# Patient Record
Sex: FEMALE | Race: BLACK OR AFRICAN AMERICAN | NOT HISPANIC OR LATINO | Employment: UNEMPLOYED | URBAN - METROPOLITAN AREA
[De-identification: names, ages, dates, MRNs, and addresses within clinical notes are randomized per-mention and may not be internally consistent; named-entity substitution may affect disease eponyms.]

---

## 2017-01-02 ENCOUNTER — HISTORICAL (OUTPATIENT)
Dept: ADMINISTRATIVE | Facility: HOSPITAL | Age: 42
End: 2017-01-02

## 2017-01-14 ENCOUNTER — HISTORICAL (OUTPATIENT)
Dept: ADMINISTRATIVE | Facility: HOSPITAL | Age: 42
End: 2017-01-14

## 2017-05-26 ENCOUNTER — HISTORICAL (OUTPATIENT)
Dept: ADMINISTRATIVE | Facility: HOSPITAL | Age: 42
End: 2017-05-26

## 2018-12-13 ENCOUNTER — HISTORICAL (OUTPATIENT)
Dept: ADMINISTRATIVE | Facility: HOSPITAL | Age: 43
End: 2018-12-13

## 2018-12-13 LAB
ABS NEUT (OLG): 5.42 X10(3)/MCL (ref 2.1–9.2)
ALBUMIN SERPL-MCNC: 3.1 GM/DL (ref 3.4–5)
ALBUMIN/GLOB SERPL: 0.7 {RATIO}
ALP SERPL-CCNC: 59 UNIT/L (ref 38–126)
ALT SERPL-CCNC: 23 UNIT/L (ref 12–78)
AST SERPL-CCNC: 14 UNIT/L (ref 15–37)
BASOPHILS # BLD AUTO: 0 X10(3)/MCL (ref 0–0.2)
BASOPHILS NFR BLD AUTO: 0 %
BILIRUB SERPL-MCNC: 0.2 MG/DL (ref 0.2–1)
BILIRUBIN DIRECT+TOT PNL SERPL-MCNC: 0.1 MG/DL (ref 0–0.2)
BILIRUBIN DIRECT+TOT PNL SERPL-MCNC: 0.1 MG/DL (ref 0–0.8)
BUN SERPL-MCNC: 17 MG/DL (ref 7–18)
CALCIUM SERPL-MCNC: 8.7 MG/DL (ref 8.5–10.1)
CHLORIDE SERPL-SCNC: 106 MMOL/L (ref 98–107)
CO2 SERPL-SCNC: 26 MMOL/L (ref 21–32)
CREAT SERPL-MCNC: 0.76 MG/DL (ref 0.55–1.02)
EOSINOPHIL # BLD AUTO: 0.2 X10(3)/MCL (ref 0–0.9)
EOSINOPHIL NFR BLD AUTO: 2 %
ERYTHROCYTE [DISTWIDTH] IN BLOOD BY AUTOMATED COUNT: 13.6 % (ref 11.5–17)
GLOBULIN SER-MCNC: 4.2 GM/DL (ref 2.4–3.5)
GLUCOSE SERPL-MCNC: 103 MG/DL (ref 74–106)
HCT VFR BLD AUTO: 34 % (ref 37–47)
HGB BLD-MCNC: 10.6 GM/DL (ref 12–16)
LYMPHOCYTES # BLD AUTO: 4.3 X10(3)/MCL (ref 0.6–4.6)
LYMPHOCYTES NFR BLD AUTO: 40 %
MCH RBC QN AUTO: 27.6 PG (ref 27–31)
MCHC RBC AUTO-ENTMCNC: 31.2 GM/DL (ref 33–36)
MCV RBC AUTO: 88.5 FL (ref 80–94)
MONOCYTES # BLD AUTO: 0.6 X10(3)/MCL (ref 0.1–1.3)
MONOCYTES NFR BLD AUTO: 6 %
NEUTROPHILS # BLD AUTO: 5.42 X10(3)/MCL (ref 2.1–9.2)
NEUTROPHILS NFR BLD AUTO: 51 %
PLATELET # BLD AUTO: 299 X10(3)/MCL (ref 130–400)
PMV BLD AUTO: 9.7 FL (ref 9.4–12.4)
POTASSIUM SERPL-SCNC: 4.1 MMOL/L (ref 3.5–5.1)
PROT SERPL-MCNC: 7.3 GM/DL (ref 6.4–8.2)
RBC # BLD AUTO: 3.84 X10(6)/MCL (ref 4.2–5.4)
SODIUM SERPL-SCNC: 140 MMOL/L (ref 136–145)
WBC # SPEC AUTO: 10.6 X10(3)/MCL (ref 4.5–11.5)

## 2018-12-14 LAB
APPEARANCE, UA: CLEAR
BACTERIA SPEC CULT: NORMAL /HPF
BILIRUB UR QL STRIP: NEGATIVE
COLOR UR: YELLOW
GLUCOSE (UA): NEGATIVE
HGB UR QL STRIP: NEGATIVE
KETONES UR QL STRIP: NEGATIVE
LEUKOCYTE ESTERASE UR QL STRIP: NEGATIVE
NITRITE UR QL STRIP: NEGATIVE
PH UR STRIP: 5.5 [PH] (ref 5–9)
PROT UR QL STRIP: NEGATIVE
RBC #/AREA URNS HPF: NORMAL /HPF
SP GR UR STRIP: 1.01 (ref 1–1.03)
SQUAMOUS EPITHELIAL, UA: NORMAL
UROBILINOGEN UR STRIP-ACNC: 1
WBC #/AREA URNS HPF: NORMAL /[HPF]

## 2019-09-17 ENCOUNTER — HISTORICAL (OUTPATIENT)
Dept: PREADMISSION TESTING | Facility: HOSPITAL | Age: 44
End: 2019-09-17

## 2020-06-11 ENCOUNTER — HISTORICAL (OUTPATIENT)
Dept: ADMINISTRATIVE | Facility: HOSPITAL | Age: 45
End: 2020-06-11

## 2021-07-08 ENCOUNTER — HISTORICAL (OUTPATIENT)
Dept: ADMINISTRATIVE | Facility: HOSPITAL | Age: 46
End: 2021-07-08

## 2021-07-08 LAB
ABS NEUT (OLG): 4.15 X10(3)/MCL (ref 2.1–9.2)
BASOPHILS # BLD AUTO: 0.1 X10(3)/MCL (ref 0–0.2)
BASOPHILS NFR BLD AUTO: 0 %
CALCIUM SERPL-MCNC: 9.2 MG/DL (ref 8.4–10.2)
CHLORIDE SERPL-SCNC: 107 MMOL/L (ref 98–107)
CHOLEST SERPL-MCNC: 210 MG/DL
CHOLEST/HDLC SERPL: 3 {RATIO} (ref 0–5)
CO2 SERPL-SCNC: 28 MMOL/L (ref 22–29)
CREAT SERPL-MCNC: 0.69 MG/DL (ref 0.55–1.02)
CREAT UR-MCNC: 144.6 MG/DL (ref 45–106)
CRP SERPL-MCNC: 0.26 MG/DL
DEPRECATED CALCIDIOL+CALCIFEROL SERPL-MC: 15 NG/ML (ref 30–80)
EOSINOPHIL # BLD AUTO: 0.1 X10(3)/MCL (ref 0–0.9)
EOSINOPHIL NFR BLD AUTO: 1 %
EOSINOPHIL NFR BLD MANUAL: 1 % (ref 0–8)
ERYTHROCYTE [DISTWIDTH] IN BLOOD BY AUTOMATED COUNT: 15.2 % (ref 11.5–17)
ERYTHROCYTE [SEDIMENTATION RATE] IN BLOOD: 33 MM/HR (ref 0–20)
EST. AVERAGE GLUCOSE BLD GHB EST-MCNC: 119.8 MG/DL
FERRITIN SERPL-MCNC: 13.25 NG/ML (ref 4.63–204)
FOLATE SERPL-MCNC: 7.1 NG/ML (ref 7–31.4)
GLOBULIN SER-MCNC: 3.9 GM/DL (ref 2.4–3.5)
GLUCOSE SERPL-MCNC: 97 MG/DL (ref 74–100)
HBA1C MFR BLD: 5.8 %
HCT VFR BLD AUTO: 36.4 % (ref 37–47)
HDLC SERPL-MCNC: 67 MG/DL (ref 35–60)
HGB BLD-MCNC: 11.5 GM/DL (ref 12–16)
IRON SATN MFR SERPL: 20 % (ref 20–50)
IRON SERPL-MCNC: 77 UG/DL (ref 50–170)
LDLC SERPL CALC-MCNC: 115 MG/DL (ref 50–140)
LYMPHOCYTES # BLD AUTO: 10.2 X10(3)/MCL (ref 0.6–4.6)
LYMPHOCYTES NFR BLD AUTO: 67 %
LYMPHOCYTES NFR BLD MANUAL: 47 % (ref 13–40)
MAGNESIUM SERPL-MCNC: 2.1 MG/DL (ref 1.6–2.6)
MCH RBC QN AUTO: 26.7 PG (ref 27–31)
MCHC RBC AUTO-ENTMCNC: 31.6 GM/DL (ref 33–36)
MCV RBC AUTO: 84.5 FL (ref 80–94)
MICROALBUMIN UR-MCNC: 11.1 UG/ML
MICROALBUMIN/CREAT RATIO PNL UR: 7.7 MG/GM CR (ref 0–30)
MONOCYTES # BLD AUTO: 0.7 X10(3)/MCL (ref 0.1–1.3)
MONOCYTES NFR BLD AUTO: 4 %
MONOCYTES NFR BLD MANUAL: 7 % (ref 2–11)
NEUTROPHILS # BLD AUTO: 4.15 X10(3)/MCL (ref 2.1–9.2)
NEUTROPHILS NFR BLD AUTO: 27 %
NEUTROPHILS NFR BLD MANUAL: 45 % (ref 47–80)
PHOSPHATE SERPL-MCNC: 3.5 MG/DL (ref 2.3–4.7)
PLATELET # BLD AUTO: 271 X10(3)/MCL (ref 130–400)
PLATELET # BLD EST: NORMAL 10*3/UL
PMV BLD AUTO: 10.6 FL (ref 9.4–12.4)
POTASSIUM SERPL-SCNC: 4 MMOL/L (ref 3.5–5.1)
PREALB SERPL-MCNC: 28.8 MG/DL (ref 16–38)
PROT SERPL-MCNC: 7.3 GM/DL (ref 6.4–8.3)
RBC # BLD AUTO: 4.31 X10(6)/MCL (ref 4.2–5.4)
RBC MORPH BLD: NORMAL
SODIUM SERPL-SCNC: 143 MMOL/L (ref 136–145)
T3FREE SERPL-MCNC: 2.69 PG/ML (ref 1.58–3.91)
T4 FREE SERPL-MCNC: 0.7 NG/DL (ref 0.7–1.48)
TIBC SERPL-MCNC: 303 UG/DL (ref 70–310)
TIBC SERPL-MCNC: 380 UG/DL (ref 250–450)
TRANSFERRIN SERPL-MCNC: 344 MG/DL (ref 180–382)
TRIGL SERPL-MCNC: 140 MG/DL (ref 37–140)
TSH SERPL-ACNC: 3.35 UIU/ML (ref 0.35–4.94)
VIT B12 SERPL-MCNC: 203 PG/ML (ref 213–816)
VLDLC SERPL CALC-MCNC: 28 MG/DL
WBC # SPEC AUTO: 15.2 X10(3)/MCL (ref 4.5–11.5)

## 2022-04-30 NOTE — ED PROVIDER NOTES
"   Patient:   Cristina Lazar            MRN: [id number]            FIN: [id number]               Age:   43 years     Sex:  Female     :  1975   Associated Diagnoses:   Urticaria; Abdominal pain, left lower quadrant   Author:   Sera GARZA, Antoni Rodriguez      Basic Information   Time seen: Date & time 2018 01:10:00.   History source: Patient.   Arrival mode: Private vehicle, wheelchair.   History limitation: None.   Additional information: Patient's physician(s): Jag GARZA, Nick BLACKMON.      History of Present Illness   The patient presents with   44 y/o AAF with a history of DM, HTN and who is s/p abdominoplasty on 18, presents to the ED with c/o left sided abdominal pain, nausea, and  generalized hives. Patient states that the abdominal pain began at 1000 18 and woke up tonight with diffuse hives with itching prompting her visit to the ED. Patient denies any new medicines and denies dysphagia or difficulty speaking. Per triage note, patient reported fever of 102 degrees F yesterday (18), but was afebrile today, and last took Percocet at 1300 (18).  .  The onset was 2018 10:00:00 .  The course/duration of symptoms is constant.  The character of symptoms is "Pain".  The degree at onset was moderate.  The Location of pain at onset was left, upper, lower and abdominal.  The degree at present is moderate.  The Location of pain at present is left, upper, lower and abdominal.  Radiating pain: none. The exacerbating factor is changing position.  The relieving factor is analgesics.  Therapy today: prescription medications including Percocet.  Risk factors consist of diabetes mellitus and hypertension.  Associated symptoms: nausea.        Review of Systems   Constitutional symptoms:  Negative except as documented in HPI.   Skin symptoms:  generalized diffuse hives with itching.   Eye symptoms:  Negative except as documented in HPI   ENMT symptoms:  Negative except as documented " in HPI.   Respiratory symptoms:  Negative except as documented in HPI.   Cardiovascular symptoms:  Negative except as documented in HPI.   Gastrointestinal symptoms:  Abdominal pain, moderate, left upper quadrant, left lower quadrant, nausea.    Genitourinary symptoms:  Negative except as documented in HPI.   Musculoskeletal symptoms:  Negative except as documented in HPI.   Neurologic symptoms:  No dysphagia, no difficulty speaking.   Psychiatric symptoms:  Negative except as documented in HPI.   Endocrine symptoms:  Negative except as documented in HPI.   Hematologic/Lymphatic symptoms:  Negative except as documented in HPI   Allergy/immunologic symptoms:  Negative except as documented in HPI             Additional review of systems information: All other systems reviewed and otherwise negative.      Health Status   Allergies:    Allergic Reactions (Selected)  No Known Medication Allergies  Nonallergic Reactions (Selected)  Severity Not Documented  Tape- No reactions were documented..   Medications:  (Selected)   Inpatient Medications  Ordered  Zofran INJ.  (IV Push / IM)  2 mg/mL: 4 mg, form: Injection, IV Push, Once, first dose 13 12:00:00 CST, stop date 13 12:00:00 CST  Prescriptions  Prescribed  Flonase 50 mcg/inh nasal spray: 1 spray(s), Nasal, BID, # 1 EA, 0 Refill(s)  Zofran ODT 4 mg oral tablet, disintegratin, Oral, q4hr, PRN PRN nausea/vomiting, # 18 tab(s), 0 Refill(s), Pharmacy: [id number]  albuterol-ipratropium MDI inhalation aerosol with adapter: 2 puff(s), INH, QID, # 1 units, 5 Refill(s)  Documented Medications  Documented  CYANOCOBALAMIN 1000 MCG/ML SOLN:   LISINOPRIL 40 MG TABS: 40 mg = 1 tab(s), Oral, Daily  PHENTERMINE 37.5 MG TABLET: 37.5 mg = 1 tab(s), Oral, Daily  TOPIRAMATE 100 MG TABLET: 100 mg = 1 tab(s), Oral, Daily  TOPIRAMATE 50 MG TABS: 50 mg = 1 tab(s), Oral, Daily, PRN PRN migraine headache  VICTOZA 18 MG/3ML SOPN: .      Past Medical/ Family/ Social  History   Medical history:    Resolved  Able to lie down ([id number]):  Resolved.  Comments:  8/27/2014 CDT 11:07 CDT - Kandi NEWMAN , Laisha casanova w/o SOB  Diabetes mellitus ([id number]):  Resolved.  Dysphagia ([id number]):  Resolved.  ET - Exercise tolerance ([id number]):  Resolved.  Hypertension ([id number]):  Resolved.  Obesity ([id number]):  Resolved.  sleep apnea resolved 3 years ago per patient ([id number]):  Resolved..   Surgical history:    23614 - EXTENDED ABDOMINOPLASTY (None) on 12/6/2018 at 43 Years.  Comments:  12/6/2018 09:16 - Jeanne Davenport RN  auto-populated from documented surgical case  Esophagogastroduodenoscopy on 9/10/2014 at 38 Years.  Comments:  9/10/2014 11:39 - Mansi Moore RN  auto-populated from documented surgical case  Gastroscope ([id number]) on 1/25/2013 at 37 Years.  Hysterectomy ([id number]).  Gastric bypass ([id number]).  Comments:  11/4/2017 07:24 - Garret NEWMAN, Isabel CONTRERAS  2009  Tonsillectomy and adenoidectomy ([id number])..   Family history:    Father  Hypertension.  Diabetes mellitus type 2  Mother  Hypertension.  Diabetes mellitus type 2  .   Social history: Alcohol use: Denies, Tobacco use: Denies, Drug use: Denies.      Physical Examination               Vital Signs   Vital Signs   12/13/2018 23:38 CST     SpO2                      99 %                             Oxygen Therapy            Room air    12/13/2018 23:23 CST     Temperature Oral          36.7 DegC                             Temperature Oral (calculated)             98.06 DegF                             Peripheral Pulse Rate     72 bpm                             Respiratory Rate          20 br/min                             SpO2                      100 %                             Oxygen Therapy            Room air                             Systolic Blood Pressure   115 mmHg                              Diastolic Blood Pressure  82 mmHg  .   Measurements   12/13/2018 23:23 CST     Weight Dosing             109 kg                             Weight Measured and Calculated in Lbs     240.30 lb                             Weight Estimated          109 kg                             Height/Length Dosing      175.26 cm                             Height/Length Estimated   175.26 cm                             Body Mass Index Estimated 35.49 kg/m2  .   Basic Oxygen Information   12/13/2018 23:38 CST     SpO2                      99 %                             Oxygen Therapy            Room air    12/13/2018 23:23 CST     SpO2                      100 %                             Oxygen Therapy            Room air  .   General:  Alert, no acute distress.    Skin:  Warm, dry, intact, Generalized diffuse hives.    Head:  Atraumatic.   Neck:  Supple.   Eye:  Normal conjunctiva.   Ears, nose, mouth and throat:  Oral mucosa moist.   Cardiovascular:  Regular rate and rhythm, No murmur.    Respiratory:  Lungs are clear to auscultation, respirations are non-labored.    Gastrointestinal:  Soft, Non distended, Normal bowel sounds, Abdominal incisions intact, no signs of infection or drainage, Tenderness: Moderate, left lower quadrant, Guarding: Negative, Rebound: Negative.    Musculoskeletal:  Normal ROM, normal strength.    Neurological:  Alert and oriented to person, place, time, and situation, No focal neurological deficit observed.       Medical Decision Making   Documents reviewed:  Emergency department nurses' notes.   Orders  Launch Order Profile (Selected)   Inpatient Orders  Ordered  Discharge Planning Ongoing Assessment: 12/16/18 9:00:00 CST, q3day  NPO: 12/13/18 23:31:00 CST, CM NPO  Saline Lock Insert: 12/13/18 23:31:00 CST, Stop date 12/13/18 23:31:00 CST  Vital Signs Notify Provider: 12/13/18 23:31:00 CST, Immediately for BP <90/60., SBP < 90, DBP < 60  Ordered (Dispatched)  Urinalysis Complete a reflex to culture:  Stat collect, Urine, 12/13/18 23:31:00 CST, Stop date 12/13/18 23:31:00 CST, Nurse collect, See MD to Nurse order., Print Label By Order Location  Ordered (Exam Completed)  CT Abdomen and Pelvis W Contrast: Stat, 12/14/18 1:19:00 CST, Other (please specify), post -op pain, None, Stretcher, Patient Has IV?, Rad Type, 12/14/18 1:19:00 CST  Completed  Automated Diff: Stat collect, 12/13/18 23:57:00 CST, Blood, Collected, Stop date 12/13/18 23:57:00 CST, Lab Collect, Print Label By Order Location, 12/13/18 23:31:00 CST  Benadryl: 25 mg, form: Injection, IV Slow, Once, first dose 12/14/18 1:20:00 CST, stop date 12/14/18 1:20:00 CST, STAT  CBC w/ Auto Diff: Stat collect, 12/13/18 23:31:00 CST, Blood, Stop date 12/13/18 23:31:00 CST, Lab Collect, Print Label By Order Location, 12/13/18 23:31:00 CST  CMP: Stat collect, 12/13/18 23:31:00 CST, Blood, Stop date 12/13/18 23:31:00 CST, Lab Collect, Print Label By Order Location, 12/13/18 23:31:00 CST  Estimated Glomerular Filtration Rate: Stat collect, 12/13/18 23:57:00 CST, Blood, Collected, Stop date 12/13/18 23:57:00 CST, Lab Collect, Print Label By Order Location, 12/13/18 23:31:00 CST  Zofran ODT: 4 mg, form: Tab-Dis, Oral, Now, first dose 12/14/18 1:19:00 CST, stop date 12/14/18 1:19:00 CST, STAT  iopamidol: form: Soln, IV, AdHoc, first dose 12/14/18 2:04:10 CST, stop date 12/14/18 2:04:10 CST  morphine 4 mg/mL preservative-free intravenous solution: 4 mg, form: Injection, IV Push, Now, first dose 12/14/18 1:20:00 CST, stop date 12/14/18 1:20:00 CST, STAT, ( > 7 on pain scale).   Results review:  Lab results : Lab View   12/13/2018 23:57 CST     Sodium Lvl                140 mmol/L                             Potassium Lvl             4.1 mmol/L                             Chloride                  106 mmol/L                             CO2                       26.0 mmol/L                             Calcium Lvl               8.7 mg/dL                             Glucose  Lvl               103 mg/dL                             BUN                       17.0 mg/dL                             Creatinine                0.76 mg/dL                             eGFR-AA                   >60 mL/min/1.73 m2  NA                             eGFR-HAYLIE                  >60 mL/min/1.73 m2  NA                             Bili Total                0.2 mg/dL                             Bili Direct               0.10 mg/dL                             Bili Indirect             0.10 mg/dL                             AST                       14 unit/L  LOW                             ALT                       23 unit/L                             Alk Phos                  59 unit/L                             Total Protein             7.3 gm/dL                             Albumin Lvl               3.10 gm/dL  LOW                             Globulin                  4.20 gm/dL  HI                             A/G Ratio                 0.7  NA                             WBC                       10.6 x10(3)/mcL                             RBC                       3.84 x10(6)/mcL  LOW                             Hgb                       10.6 gm/dL  LOW                             Hct                       34.0 %  LOW                             Platelet                  299 x10(3)/mcL                             MCV                       88.5 fL                             MCH                       27.6 pg                             MCHC                      31.2 gm/dL  LOW                             RDW                       13.6 %                             MPV                       9.7 fL                             Abs Neut                  5.42 x10(3)/mcL                             Neutro Auto               51 %  NA                             Lymph Auto                40 %  NA                             Mono Auto                 6 %  NA                             Eos Auto                  2 %   NA                             Abs Eos                   0.2 x10(3)/mcL                             Basophil Auto             0 %  NA                             Abs Neutro                5.42 x10(3)/mcL                             Abs Lymph                 4.3 x10(3)/mcL                             Abs Mono                  0.6 x10(3)/mcL                             Abs Baso                  0.0 x10(3)/mcL  .      Reexamination/ Reevaluation   Notes: Patient's hives have resolved.      Impression and Plan   Diagnosis   Urticaria (SCM18-EW L50.9)   Abdominal pain, left lower quadrant (YCX27-ZJ R10.32)      Calls-Consults   -  12/14/2018 03:02:00 , Gloria GARZA, Woody CHANEY, recommends start on antibiotics and patient can follow-up with me on Monday.    Plan   Disposition: Medically cleared, Discharged: Time  12/14/2018 03:03:00, to home.    Prescriptions: Launch prescriptions   Pharmacy:  Bactrim 400 mg-80 mg oral tablet (Prescribe): 2 tab(s), Oral, BID, X 10 day(s), # 40 tab(s), 0 Refill(s), Launch Meds List   Medications reviewed..    Patient was given the following educational materials: Hives, Easy-to-Read, Abdominal Pain, Adult, Easy-to-Read.    Follow up with: Woody Castro  12/17/2018; Anytime the conditions worsen, return to clinic or go to ED.    Counseled: Patient, Family, Regarding diagnosis, Regarding diagnostic results, Regarding treatment plan, Patient indicated understanding of instructions, Family indicated understanding of instructions.    Notes: I, Siddharth Gamboa, acted solely as a scribe for and in the presence of Dr. Zamora who performed the service., I, Siddharth Salgado, acted solely as a scribe for and in the presence of Dr. Zamora who performed the service..       Addendum      Teaching-Supervisory Addendum-Brief   Notes: I, Dr Zamora, personally performed the services described in this documentation, as scribed in my presence and it is both accurate and complete..

## 2022-04-30 NOTE — DISCHARGE SUMMARY
DISCHARGE DATE:  05/27/2017    CHIEF COMPLAINT:  Left-sided chest pain.    HISTORY OF PRESENT ILLNESS:  This is a 41-year-old female with a history of hypertension, diabetes and obesity who came in complaining of left-sided chest pain.  She says this started at work.  She works as a .  She described it as a sharp, stabbing-type pain associated with some sweats and nausea.  She came to the ER for evaluation and was admitted for observation.  She has had angiograms in the past that apparently showed nonobstructive disease.       At present, she says her pain has improved.  The pain was found to be reproducible on exam with palpation in the left side of her sternum.  She says it is the same pain that she has been having.  She says this pain is different than when she had angiograms in the past.  She is fairly active as the job as a , going up and downstairs regularly without angina or shortness of breath.    REVIEW OF SYSTEMS:  See HPI.  The rest of the review of systems are negative.    PCP:  Raj Yusuf M.D.    CARDIOLOGIST:  Dr. Warner Block M.D.    PAST MEDICAL HISTORY:  Obesity, hypertension, diabetes.    PAST SURGICAL HISTORY:  Gastric bypass, tubal ligation, tonsillectomy and adenectomy, cholecystectomy, hysterectomy, EGD.    SOCIAL HISTORY:  No smoking, drugs or alcohol.       October 2013 she had an echocardiogram which showed an EF of 60% and an angiogram that showed no evidence of coronary artery disease.    PHYSICAL EXAMINATION:  VITALS:  Blood pressure 143/97, temperature 36.2, pulse 65, respirations 18, sat 100%.     GENERAL:  Overweight female in no apparent distress.     EYES:  She has equal round, anicteric.     MOUTH:  Pink, moist.     NECK:  No JVD.  No lymphadenopathy.     LUNGS:  Clear.  No wheezing, rhonchi or crackles.   CARDIOVASCULAR: S1, S2.  Regular rhythm.  No murmurs, rubs, or gallops.     CHEST:  She did have reproducible left-sided sternal pain on  palpation.   ABDOMEN:  Soft, nontender.  No rebound or guarding.   EXTREMITIES:  No cyanosis, clubbing or edema.   SKIN:  No lesion or rashes.   NEURO:  Awake, alert, oriented x3.  Cranial nerves II through XII grossly intact.   PSYCH:  Good judgment and insight.  Calm and cooperative.    LABS:  Sodium 144, potassium 2.3, creatinine 1.1.  LFTs within normal limits.  Troponin negative x3 sets.  D-dimer normal 0.38.  White count 10, hemoglobin 12, platelets 262.       She did have a CT of her chest to rule out PE and was negative for PE.  There was a mildly dilated ascending thoracic aneurysm in the main pulmonary artery.  Ascending thoracic aorta measured 4.2 cm, pulmonary artery measured 3.3 cm.    ASSESSMENT:    1. Atypical chest pain probably musculoskeletal.  2. Hypertension.  3. Diabetes.  4. Mildly dilated ascending thoracic aorta measuring 4.2 centimeters.  5. Costochondritis.    DISCHARGE PLAN:  Will send her home on some Motrin q.6 hours for the next three days to help with her costochondritis.       Resume her home meds.       Cardiology has recommended starting beta blocker due to the aortic findings which has been done.  Otherwise, she is doing well and will be released home.       Discharge time greater than 30 minutes.        ______________________________  MD LENARD Alfaro/TONO  DD:  05/27/2017  Time:  02:51PM  DT:  05/27/2017  Time:  04:35PM  Job #:  [id number]    cc: MD Raj Xie MD

## 2022-04-30 NOTE — H&P
DISCHARGE DATE:  05/27/2017    CHIEF COMPLAINT:  Left-sided chest pain.    HISTORY OF PRESENT ILLNESS:  This is a 41-year-old female with a history of hypertension, diabetes and obesity who came in complaining of left-sided chest pain.  She says this started at work.  She works as a .  She described it as a sharp, stabbing-type pain associated with some sweats and nausea.  She came to the ER for evaluation and was admitted for observation.  She has had angiograms in the past that apparently showed nonobstructive disease.       At present, she says her pain has improved.  The pain was found to be reproducible on exam with palpation in the left side of her sternum.  She says it is the same pain that she has been having.  She says this pain is different than when she had angiograms in the past.  She is fairly active as the job as a , going up and downstairs regularly without angina or shortness of breath.    REVIEW OF SYSTEMS:  See HPI.  The rest of the review of systems are negative.    PCP:  Raj Yusuf M.D.    CARDIOLOGIST:  Dr. Warner Block M.D.    PAST MEDICAL HISTORY:  Obesity, hypertension, diabetes.    PAST SURGICAL HISTORY:  Gastric bypass, tubal ligation, tonsillectomy and adenectomy, cholecystectomy, hysterectomy, EGD.    SOCIAL HISTORY:  No smoking, drugs or alcohol.       October 2013 she had an echocardiogram which showed an EF of 60% and an angiogram that showed no evidence of coronary artery disease.    PHYSICAL EXAMINATION:  VITALS:  Blood pressure 143/97, temperature 36.2, pulse 65, respirations 18, sat 100%.     GENERAL:  Overweight female in no apparent distress.     EYES:  She has equal round, anicteric.     MOUTH:  Pink, moist.     NECK:  No JVD.  No lymphadenopathy.     LUNGS:  Clear.  No wheezing, rhonchi or crackles.   CARDIOVASCULAR: S1, S2.  Regular rhythm.  No murmurs, rubs, or gallops.     CHEST:  She did have reproducible left-sided sternal pain on  palpation.   ABDOMEN:  Soft, nontender.  No rebound or guarding.   EXTREMITIES:  No cyanosis, clubbing or edema.   SKIN:  No lesion or rashes.   NEURO:  Awake, alert, oriented x3.  Cranial nerves II through XII grossly intact.   PSYCH:  Good judgment and insight.  Calm and cooperative.    LABS:  Sodium 144, potassium 2.3, creatinine 1.1.  LFTs within normal limits.  Troponin negative x3 sets.  D-dimer normal 0.38.  White count 10, hemoglobin 12, platelets 262.       She did have a CT of her chest to rule out PE and was negative for PE.  There was a mildly dilated ascending thoracic aneurysm in the main pulmonary artery.  Ascending thoracic aorta measured 4.2 cm, pulmonary artery measured 3.3 cm.    ASSESSMENT:    1. Atypical chest pain probably musculoskeletal.  2. Hypertension.  3. Diabetes.  4. Mildly dilated ascending thoracic aorta measuring 4.2 centimeters.  5. Costochondritis.    DISCHARGE PLAN:  Will send her home on some Motrin q.6 hours for the next three days to help with her costochondritis.       Resume her home meds.       Cardiology has recommended starting beta blocker due to the aortic findings which has been done.  Otherwise, she is doing well and will be released home.       Discharge time greater than 30 minutes.        ______________________________  MD LENARD Alfaor/TONO  DD:  05/27/2017  Time:  02:51PM  DT:  05/27/2017  Time:  04:35PM  Job #:  [id number]    cc: MD Raj Xie MD

## 2022-04-30 NOTE — ED PROVIDER NOTES
Patient:   Cristina Lazar            MRN: [id number]            FIN: [id number]               Age:   41 years     Sex:  Female     :  1975   Associated Diagnoses:   Acute chest pain; Ascending aorta dilation; Acute chest pain   Author:   Heidi Metcalf MD      Basic Information   History source: Patient.   Arrival mode: Private vehicle.   History limitation: None.   Additional information: Patient's physician(s): PMD, Dr Yusuf, Cardiologist, Dr Rasmussen, Chief Complaint from Nursing Triage Note : Chief Complaint   2017 21:02 CDT      Chief Complaint           sternal chest pain since 1700  .      History of Present Illness   The patient presents with chest pain.  The onset was 5pm.  The course/duration of symptoms is constant and fluctuating in intensity.  Location: Left central substernal chest. Radiating pain: none. The character of symptoms is sharp and stabbing.  The degree at onset was moderate.  The degree at maximum was moderate.  The degree at present is minimal.  The exacerbating factor is none.  The relieving factor is none.  Risk factors consist of hypertension, diabetes mellitus, obesity, not smoking and not hyperlipidemia.  Associated symptoms: nausea and diaphoresis.  Additional history: Angiogram last year per Dr Block, negative, Complains of CP since 5pm, sharp stabbing pain that comes and goes, associated with nausea and diaphoresis.  No fever or cough.  No trauma.  Never had this before.  No history of blood clots.  Not on hormone replacement.        Review of Systems   Skin symptoms:  Diaphoresis.   Cardiovascular symptoms:  Chest pain.   Gastrointestinal symptoms:  Nausea.             Additional review of systems information: All other systems reviewed and otherwise negative.      Health Status   Allergies:    Nonallergic Reactions (Selected)  Severity Not Documented  Tape- No reactions were documented.,    Allergies (1) Active Reaction  Tape None Documented  .    Medications:  (Selected)   Inpatient Medications  Ordered  Zofran INJ.  (IV Push / IM)  2 mg/mL: 4 mg, form: Injection, IV Push, Once, first dose 01/25/13 12:00:00 CST, stop date 01/25/13 12:00:00 CST  Pending Complete  midazolam: 2 mg, form: Injection, IV Push, q5min, Order duration: 2 dose(s), first dose 01/25/13 9:20:00 CST, stop date 01/25/13 9:29:00 CST, (up to 5 mg for moderate anxiety)  midazolam: 2 mg, form: Injection, IV Push, q5min, Order duration: 2 dose(s), first dose 09/10/14 8:45:00 CDT, stop date 09/10/14 8:54:00 CDT, (up to 5 mg for moderate anxiety)  Prescriptions  Prescribed  Flonase 50 mcg/inh nasal spray: 1 spray(s), Nasal, BID, # 1 EA, 0 Refill(s)  albuterol-ipratropium MDI inhalation aerosol with adapter: 2 puff(s), INH, QID, # 1 units, 5 Refill(s).      Past Medical/ Family/ Social History   Medical history:    Resolved  Dysphagia ([id number]):  Resolved.  sleep apnea resolved 3 years ago per patient ([id number]):  Resolved.  Able to lie down ([id number]):  Resolved.  Comments:  8/27/2014 CDT 11:07 CDT - Kandi NEWMAN , Laisha casanova w/o SOB  ET - Exercise tolerance ([id number]):  Resolved.  Obesity ([id number]):  Resolved.  Diabetes mellitus ([id number]):  Resolved.  Hypertension ([id number]):  Resolved., Reviewed as documented in chart.   Surgical history:    Esophagogastroduodenoscopy on 9/10/2014 at 38 Years.  Comments:  9/10/2014 11:39 - Oscar NEWMAN, Mansi  auto-populated from documented surgical case  Gastroscope ([id number]) on 1/25/2013 at 37 Years.  Hysterectomy ([id number]).  Gastric bypass ([id number]).  Tonsillectomy and adenoidectomy ([id number])., Reviewed as documented in chart.   Family history:    Diabetes mellitus type 2  Father  Mother  Hypertension.  Father  Mother  , Reviewed as documented in chart.   Social history: Tobacco use: Denies.   Problem list:     Diabetes  Hypertension  , per nurse's notes.      Physical Examination               Vital Signs   Vital Signs   5/26/2017 21:02 CDT      Temperature Oral          36.9 DegC                             Peripheral Pulse Rate     79 bpm                             Respiratory Rate          18 br/min                             SpO2                      100 %                             Oxygen Therapy            Room air                             Systolic Blood Pressure   158 mmHg  HI                             Diastolic Blood Pressure  97 mmHg  HI  .      Vital Signs (last 24 hrs)_____  Last Charted___________  Temp Oral     36.9 DegC  (MAY 26 21:02)  Heart Rate Peripheral   79 bpm  (MAY 26 21:02)  Resp Rate         18 br/min  (MAY 26 21:02)  SBP      H 158mmHg  (MAY 26 21:02)  DBP      H 97mmHg  (MAY 26 21:02)  SpO2      100 %  (MAY 26 21:02)  .   Measurements   5/26/2017 21:02 CDT      Weight Dosing             125 kg                             Weight Measured and Calculated in Lbs     275.57 lb                             Weight Estimated          125 kg                             Height/Length Dosing      175 cm                             Height/Length Estimated   175 cm                             Body Mass Index Estimated 40.82 kg/m2  .   Basic Oxygen Information   5/26/2017 21:02 CDT      SpO2                      100 %                             Oxygen Therapy            Room air  .   General:  Alert.   Skin:  Warm, dry, pink, intact.    Eye:  Pupils are equal, round and reactive to light.   Neck:  Supple, no JVD.    Cardiovascular:  Regular rate and rhythm.   Respiratory:  Lungs are clear to auscultation.   Gastrointestinal:  Soft, Nontender.    Musculoskeletal:  Normal ROM, normal strength, no tenderness, no swelling, no deformity, No sign of DVT.    Neurological:  Alert and oriented to person, place, time, and situation.   Psychiatric:  Cooperative.      Medical Decision Making   Differential  Diagnosis:  Unstable angina, anxiety, pulmonary embolism, atypical chest pain, aortic dissection, pneumonia, gastroesophageal reflux disease, costochondritis.    Documents reviewed:  Emergency department nurses' notes.   Orders  Launch Orders   Pharmacy:  morphine 2 mg/mL-NaCl 0.9% intravenous solution (Order): 4 mg, IV, Once, first dose 5/26/2017 23:33 CDT, stop date 5/26/2017 23:33 CDT, 24, Launch Orders   Admit/Transfer/Discharge:  Place in Outpatient Observation (Order): 5/27/2017 2:08 CDT, King GREG, Jacky T Telemetry with Monitor, No.    Electrocardiogram:  Time 5/26/2017 21:10:00, rate 69, normal sinus rhythm, No ST-T changes, no ectopy, normal NC & QRS intervals.    Results review:  Lab results : Lab View   5/26/2017 21:10 CDT      Sodium Lvl                144 mmol/L                             Potassium Lvl             3.3 mmol/L  LOW                             Chloride                  111 mmol/L  HI                             CO2                       25.0 mmol/L                             Calcium Lvl               8.8 mg/dL                             Glucose Lvl               76 mg/dL                             BUN                       13.0 mg/dL                             Creatinine                1.10 mg/dL  HI                             eGFR-AA                   >60 mL/min/1.73 m2  NA                             eGFR-HAYLIE                  58 mL/min/1.73 m2  NA                             Bili Total                0.6 mg/dL                             Bili Direct               0.10 mg/dL                             Bili Indirect             0.50 mg/dL                             AST                       19 unit/L                             ALT                       30 unit/L                             Alk Phos                  62 unit/L                             Total Protein             7.9 gm/dL                             Albumin Lvl               3.7 gm/dL                              Globulin                  4 gm/dL                             A/G Ratio                 1  NA                             Troponin-I                <0.015 ng/mL                             D-Dimer                   0.38 mg/dL                             WBC                       10.3 x10(3)/mcL  HI                             RBC                       4.40 x10(6)/mcL                             Hgb                       12.2 gm/dL                             Hct                       37.0 %                             Platelet                  262 x10(3)/mcL                             MCV                       84.1 fL                             MCH                       27.7 pg  LOW                             MCHC                      33.0 %                             RDW                       14.4 %                             MPV                       10.5 fL  HI                             Abs Neut                  5.3 x10(3)/mcL  NA                             Neutro Auto               51.4 %                             Lymph Auto                40.6 %                             Mono Auto                 6.1 %                             Eos Auto                  1.3 %                             Abs Eos                   0.1 x10(3)/mcL  NA                             Basophil Auto             0.4 %                             Abs Neutro                5.3 x10(3)/mcL  NA                             Abs Lymph                 4.2 x10(3)/mcL  NA                             Abs Mono                  0.6 x10(3)/mcL  NA                             Abs Baso                  0.0 x10(3)/mcL  NA  ,    No qualifying data available.    Chest X-Ray:  Time reported 5/26/2017 21:10:00, no acute disease process.       Reexamination/ Reevaluation   Time: 5/27/2017 02:06:00 .   Vital signs   Basic Oxygen Information   5/26/2017 21:02 CDT      SpO2                      100 %                             Oxygen Therapy             Room air     Notes: Pain is improved, and it seems to come and go.  Nausea has resolved.  She denies heartburn or belching..  I am unsure of the cause of her pain, most likeley unreleated to aortic dilation seen on CT scan but will place her in observation.  Cardiology requests Hospitalist admit with cardiology consult if needed.      Impression and Plan   Diagnosis   Acute chest pain (INV57-AE R07.9)   Acute chest pain (SOV57-NS R07.9)   Ascending aorta dilation (CPI42-NG I77.810)      Calls-Consults   -  5/26/2017 23:30:00 , Margo Taveras MD, Discussed with Kieran, nurse practitioner.  He will look up her previous angiogram result and then give me a call back..    -  5/26/2017 23:36:00 , Margo Taveras MD, Kieran nurse practitioner called back and states that Ms. Lazar had an angiogram in 2015 by Dr. Escobar which was completely negative..    -  5/27/2017 02:08:00 , King GREG, Jacky PATE, Chest pain has continued so we will place Ms. Huerta cardiac observation with a consult to the cardiology service.  Dr. Joyce is agreeable to admit.  Her vitals have been stable and though she continues to complain of chest pain, she is resting comfortably and even sleeping when undisturbed..    Plan   Condition: Stable.    Disposition: Admit time  5/27/2017 02:08:00, Place in Observation Telemetry Unit.    Counseled: Patient, Regarding diagnosis, Regarding diagnostic results, Regarding treatment plan, Regarding prescription, Patient indicated understanding of instructions.

## 2022-11-23 ENCOUNTER — HOSPITAL ENCOUNTER (EMERGENCY)
Facility: HOSPITAL | Age: 47
Discharge: HOME | End: 2022-11-23
Attending: EMERGENCY MEDICINE
Payer: COMMERCIAL

## 2022-11-23 VITALS
TEMPERATURE: 99 F | HEIGHT: 70 IN | SYSTOLIC BLOOD PRESSURE: 121 MMHG | RESPIRATION RATE: 18 BRPM | WEIGHT: 293 LBS | OXYGEN SATURATION: 99 % | HEART RATE: 88 BPM | BODY MASS INDEX: 41.95 KG/M2 | DIASTOLIC BLOOD PRESSURE: 78 MMHG

## 2022-11-23 DIAGNOSIS — U07.1 COVID-19: Primary | ICD-10-CM

## 2022-11-23 LAB
FLUAV AG UPPER RESP QL IA.RAPID: NOT DETECTED
FLUBV AG UPPER RESP QL IA.RAPID: NOT DETECTED
SARS-COV-2 RNA RESP QL NAA+PROBE: DETECTED
STREP A PCR (OHS): NOT DETECTED

## 2022-11-23 PROCEDURE — 0240U COVID/FLU A&B PCR: CPT | Performed by: NURSE PRACTITIONER

## 2022-11-23 PROCEDURE — 87651 STREP A DNA AMP PROBE: CPT | Performed by: NURSE PRACTITIONER

## 2022-11-23 PROCEDURE — 63600175 PHARM REV CODE 636 W HCPCS: Performed by: NURSE PRACTITIONER

## 2022-11-23 PROCEDURE — 96372 THER/PROPH/DIAG INJ SC/IM: CPT | Performed by: NURSE PRACTITIONER

## 2022-11-23 PROCEDURE — 99284 EMERGENCY DEPT VISIT MOD MDM: CPT

## 2022-11-23 RX ORDER — CLONIDINE 0.2 MG/24H
1 PATCH, EXTENDED RELEASE TRANSDERMAL
COMMUNITY
Start: 2022-11-01

## 2022-11-23 RX ORDER — AZITHROMYCIN 250 MG/1
250 TABLET, FILM COATED ORAL
COMMUNITY
Start: 2022-11-22 | End: 2023-08-11 | Stop reason: CLARIF

## 2022-11-23 RX ORDER — ALBUTEROL SULFATE 90 UG/1
2 AEROSOL, METERED RESPIRATORY (INHALATION) EVERY 6 HOURS PRN
Qty: 8 G | Refills: 0 | Status: SHIPPED | OUTPATIENT
Start: 2022-11-23 | End: 2023-08-11

## 2022-11-23 RX ORDER — HYDRALAZINE HYDROCHLORIDE 100 MG/1
150 TABLET, FILM COATED ORAL 2 TIMES DAILY
COMMUNITY
Start: 2022-11-01

## 2022-11-23 RX ORDER — CONJUGATED ESTROGENS 0.62 MG/G
CREAM VAGINAL
COMMUNITY
Start: 2022-11-02

## 2022-11-23 RX ORDER — DEXAMETHASONE SODIUM PHOSPHATE 4 MG/ML
8 INJECTION, SOLUTION INTRA-ARTICULAR; INTRALESIONAL; INTRAMUSCULAR; INTRAVENOUS; SOFT TISSUE
Status: COMPLETED | OUTPATIENT
Start: 2022-11-23 | End: 2022-11-23

## 2022-11-23 RX ORDER — CODEINE PHOSPHATE AND GUAIFENESIN 10; 100 MG/5ML; MG/5ML
SOLUTION ORAL
Qty: 120 ML | Refills: 0 | Status: SHIPPED | OUTPATIENT
Start: 2022-11-23 | End: 2023-08-11 | Stop reason: CLARIF

## 2022-11-23 RX ORDER — SEMAGLUTIDE 2.68 MG/ML
INJECTION, SOLUTION SUBCUTANEOUS
Status: ON HOLD | COMMUNITY
Start: 2022-11-07 | End: 2023-08-18 | Stop reason: HOSPADM

## 2022-11-23 RX ADMIN — DEXAMETHASONE SODIUM PHOSPHATE 8 MG: 4 INJECTION, SOLUTION INTRA-ARTICULAR; INTRALESIONAL; INTRAMUSCULAR; INTRAVENOUS; SOFT TISSUE at 03:11

## 2022-11-23 NOTE — Clinical Note
"Deepakcinthya "Zoe Lazar was seen and treated in our emergency department on 11/23/2022.  She may return to work on 11/29/2022.       If you have any questions or concerns, please don't hesitate to call.      ERENDIRA Junior"

## 2022-11-23 NOTE — ED PROVIDER NOTES
Encounter Date: 11/23/2022       History     Chief Complaint   Patient presents with    Generalized Body Aches     Pt complaint of cough, chest and sinus congestion with drainage, fever, body aches     Patient states coughing, congestion, body aches, fever, chills, and a runny nose starting yesterday. Denies any sore throat, chest pain, SOB, or abdominal pain.     The history is provided by the patient.   Cough  This is a new problem. The current episode started yesterday. Episode frequency: intermittently. The problem has been unchanged. The cough is Non-productive. The maximum temperature recorded prior to her arrival was 102 - 102.9 F. The fever has been present for 1 to 2 days. Associated symptoms include chills and rhinorrhea. Pertinent negatives include no chest pain, no ear pain, no headaches, no sore throat, no shortness of breath and no wheezing. She has tried nothing for the symptoms.   Review of patient's allergies indicates:  No Known Allergies  Past Medical History:   Diagnosis Date    Diabetes mellitus     Hypertension      No past surgical history on file.  No family history on file.     Review of Systems   Constitutional:  Positive for chills and fever.   HENT:  Positive for congestion and rhinorrhea. Negative for ear pain and sore throat.    Eyes: Negative.    Respiratory:  Positive for cough. Negative for shortness of breath and wheezing.    Cardiovascular: Negative.  Negative for chest pain.   Gastrointestinal: Negative.  Negative for abdominal pain and vomiting.   Endocrine: Negative.    Genitourinary: Negative.    Musculoskeletal: Negative.    Skin: Negative.  Negative for rash.   Allergic/Immunologic: Negative.    Neurological: Negative.  Negative for headaches.   Hematological: Negative.    Psychiatric/Behavioral: Negative.     All other systems reviewed and are negative.    Physical Exam     Initial Vitals [11/23/22 1308]   BP Pulse Resp Temp SpO2   (!) 126/94 100 20 99.4 °F (37.4 °C) 97 %       MAP       --         Physical Exam    Nursing note and vitals reviewed.  Constitutional: She appears well-developed and well-nourished. No distress.   HENT:   Head: Normocephalic and atraumatic.   Mouth/Throat: Oropharynx is clear and moist.   Eyes: Conjunctivae and EOM are normal. Pupils are equal, round, and reactive to light.   Neck: Neck supple.   Normal range of motion.  Cardiovascular:  Normal rate, regular rhythm, normal heart sounds and intact distal pulses.           Pulmonary/Chest: Breath sounds normal. No respiratory distress. She has no wheezes.   Abdominal: Abdomen is soft. She exhibits no distension. There is no abdominal tenderness.   Musculoskeletal:         General: No tenderness or edema. Normal range of motion.      Cervical back: Normal range of motion and neck supple.     Neurological: She is alert and oriented to person, place, and time. She has normal strength. GCS score is 15. GCS eye subscore is 4. GCS verbal subscore is 5. GCS motor subscore is 6.   Skin: Skin is warm and dry. No rash noted.   Psychiatric: She has a normal mood and affect. Thought content normal.       ED Course   Procedures  Labs Reviewed   COVID/FLU A&B PCR - Abnormal; Notable for the following components:       Result Value    SARS-CoV-2 PCR Detected (*)     All other components within normal limits    Narrative:     The Xpert Xpress SARS-CoV-2/FLU/RSV plus is a rapid, multiplexed real-time PCR test intended for the simultaneous qualitative detection and differentiation of SARS-CoV-2, Influenza A, Influenza B, and respiratory syncytial virus (RSV) viral RNA in either nasopharyngeal swab or nasal swab specimens.         STREP GROUP A BY PCR - Normal    Narrative:     The Xpert Xpress Strep A test is a rapid, qualitative in vitro diagnostic test for the detection of Streptococcus pyogenes (Group A ß-hemolytic Streptococcus, Strep A) in throat swab specimens from patients with signs and symptoms of pharyngitis.             Imaging Results    None          Medications   dexAMETHasone injection 8 mg (has no administration in time range)     Medical Decision Making:   Initial Assessment:   Patient is awake, alert, afebrile, lungs are clear, her o2 sat is 97% on RA, and she is nontoxic appearing in the ED.  Differential Diagnosis:   Covid, Flu, URI, Viral Illness  Clinical Tests:   Lab Tests: Ordered and Reviewed  ED Management:  Patient is positive for Covid. Patient's lungs are clear, o2 sat is 97% on RA, and she does not appear to be in any respiratory distress in the ED. Patient denies any chest pain or SOB. Discussed positive Covid swab with patient. Will treat symptomatically and patient was given strict ED return precautions.                        Clinical Impression:   Final diagnoses:  [U07.1] COVID-19 (Primary)      ED Disposition Condition    Discharge Stable          ED Prescriptions       Medication Sig Dispense Start Date End Date Auth. Provider    guaiFENesin-codeine 100-10 mg/5 ml (TUSSI-ORGANIDIN NR)  mg/5 mL syrup May take 5 mL by mouth as needed for coughing every 6 hours. 120 mL 11/23/2022 -- ERENDIRA Junior    albuterol (PROAIR HFA) 90 mcg/actuation inhaler Inhale 2 puffs into the lungs every 6 (six) hours as needed for Wheezing. Rescue 8 g 11/23/2022 11/30/2022 ERENDIRA Junior          Follow-up Information    None          ERENDIRA Junior  11/23/22 [id number]

## 2023-03-09 ENCOUNTER — LAB VISIT (OUTPATIENT)
Dept: LAB | Facility: HOSPITAL | Age: 48
End: 2023-03-09
Attending: INTERNAL MEDICINE
Payer: COMMERCIAL

## 2023-03-09 DIAGNOSIS — Z51.81 ENCOUNTER FOR THERAPEUTIC DRUG MONITORING: ICD-10-CM

## 2023-03-09 DIAGNOSIS — K91.2 HYPOGLYCEMIA FOLLOWING GASTROINTESTINAL SURGERY: ICD-10-CM

## 2023-03-09 DIAGNOSIS — E34.9 ENDOCRINE DISORDER RELATED TO PUBERTY: ICD-10-CM

## 2023-03-09 DIAGNOSIS — E78.5 HYPERLIPIDEMIA, UNSPECIFIED HYPERLIPIDEMIA TYPE: ICD-10-CM

## 2023-03-09 DIAGNOSIS — Z13.29 SCREENING FOR THYROID DISORDER: ICD-10-CM

## 2023-03-09 DIAGNOSIS — E55.9 AVITAMINOSIS D: ICD-10-CM

## 2023-03-09 DIAGNOSIS — Z13.228 SCREENING FOR PHENYLKETONURIA (PKU): ICD-10-CM

## 2023-03-09 DIAGNOSIS — E07.9 DISEASE OF THYROID GLAND: ICD-10-CM

## 2023-03-09 DIAGNOSIS — M10.9 GOUT, UNSPECIFIED CAUSE, UNSPECIFIED CHRONICITY, UNSPECIFIED SITE: ICD-10-CM

## 2023-03-09 DIAGNOSIS — E66.9 OBESITY, UNSPECIFIED CLASSIFICATION, UNSPECIFIED OBESITY TYPE, UNSPECIFIED WHETHER SERIOUS COMORBIDITY PRESENT: ICD-10-CM

## 2023-03-09 DIAGNOSIS — E61.1 IRON DEFICIENCY: ICD-10-CM

## 2023-03-09 DIAGNOSIS — Z79.899 ENCOUNTER FOR LONG-TERM (CURRENT) USE OF OTHER MEDICATIONS: ICD-10-CM

## 2023-03-09 DIAGNOSIS — Z13.21 SCREENING FOR MALNUTRITION: ICD-10-CM

## 2023-03-09 DIAGNOSIS — R53.83 FATIGUE, UNSPECIFIED TYPE: ICD-10-CM

## 2023-03-09 DIAGNOSIS — E11.9 DIABETES MELLITUS WITHOUT COMPLICATION: ICD-10-CM

## 2023-03-09 DIAGNOSIS — D64.9 ANEMIA, UNSPECIFIED TYPE: Primary | ICD-10-CM

## 2023-03-09 LAB
ALBUMIN SERPL-MCNC: 3.5 G/DL (ref 3.5–5)
ALBUMIN/GLOB SERPL: 1.1 RATIO (ref 1.1–2)
ALP SERPL-CCNC: 75 UNIT/L (ref 40–150)
ALT SERPL-CCNC: 27 UNIT/L (ref 0–55)
AST SERPL-CCNC: 19 UNIT/L (ref 5–34)
BILIRUBIN DIRECT+TOT PNL SERPL-MCNC: 0.7 MG/DL
BUN SERPL-MCNC: 12 MG/DL (ref 7–18.7)
CALCIUM SERPL-MCNC: 8.8 MG/DL (ref 8.4–10.2)
CHLORIDE SERPL-SCNC: 111 MMOL/L (ref 98–107)
CHOLEST SERPL-MCNC: 189 MG/DL
CHOLEST/HDLC SERPL: 3 {RATIO} (ref 0–5)
CO2 SERPL-SCNC: 25 MMOL/L (ref 22–29)
CREAT SERPL-MCNC: 0.75 MG/DL (ref 0.55–1.02)
CRP SERPL HS-MCNC: 1.85 MG/L
DEPRECATED CALCIDIOL+CALCIFEROL SERPL-MC: 29.8 NG/ML (ref 30–80)
ERYTHROCYTE [DISTWIDTH] IN BLOOD BY AUTOMATED COUNT: 15.7 % (ref 11.5–17)
EST. AVERAGE GLUCOSE BLD GHB EST-MCNC: 122.6 MG/DL
FOLATE SERPL-MCNC: 4.9 NG/ML (ref 7–31.4)
GFR SERPLBLD CREATININE-BSD FMLA CKD-EPI: >60 MLS/MIN/1.73/M2
GLOBULIN SER-MCNC: 3.3 GM/DL (ref 2.4–3.5)
GLUCOSE SERPL-MCNC: 102 MG/DL (ref 74–100)
HAV IGM SERPL QL IA: NONREACTIVE
HBA1C MFR BLD: 5.9 %
HBV CORE IGM SERPL QL IA: NONREACTIVE
HBV SURFACE AG SERPL QL IA: NONREACTIVE
HCT VFR BLD AUTO: 36.2 % (ref 37–47)
HCV AB SERPL QL IA: NONREACTIVE
HDLC SERPL-MCNC: 61 MG/DL (ref 35–60)
HGB BLD-MCNC: 11.1 G/DL (ref 12–16)
IRON SATN MFR SERPL: 22 % (ref 20–50)
IRON SERPL-MCNC: 81 UG/DL (ref 50–170)
LDLC SERPL CALC-MCNC: 106 MG/DL (ref 50–140)
MAGNESIUM SERPL-MCNC: 1.9 MG/DL (ref 1.6–2.6)
MCH RBC QN AUTO: 26.9 PG
MCHC RBC AUTO-ENTMCNC: 30.7 G/DL (ref 33–36)
MCV RBC AUTO: 87.7 FL (ref 80–94)
NRBC BLD AUTO-RTO: 0 %
PHOSPHATE SERPL-MCNC: 3.8 MG/DL (ref 2.3–4.7)
PLATELET # BLD AUTO: 279 X10(3)/MCL (ref 130–400)
PMV BLD AUTO: 10.6 FL (ref 7.4–10.4)
POTASSIUM SERPL-SCNC: 4.1 MMOL/L (ref 3.5–5.1)
PREALB SERPL-MCNC: 29.7 MG/DL (ref 16–38)
PROT SERPL-MCNC: 6.8 GM/DL (ref 6.4–8.3)
RBC # BLD AUTO: 4.13 X10(6)/MCL (ref 4.2–5.4)
SODIUM SERPL-SCNC: 144 MMOL/L (ref 136–145)
T3FREE SERPL-MCNC: 2.13 PG/ML (ref 1.57–3.91)
T4 FREE SERPL-MCNC: 0.8 NG/DL (ref 0.7–1.48)
TIBC SERPL-MCNC: 292 UG/DL (ref 70–310)
TIBC SERPL-MCNC: 373 UG/DL (ref 250–450)
TRANSFERRIN SERPL-MCNC: 333 MG/DL (ref 180–382)
TRIGL SERPL-MCNC: 111 MG/DL (ref 37–140)
TSH SERPL-ACNC: 3.57 UIU/ML (ref 0.35–4.94)
URATE SERPL-MCNC: 6.4 MG/DL (ref 2.6–6)
VIT B12 SERPL-MCNC: 239 PG/ML (ref 213–816)
VLDLC SERPL CALC-MCNC: 22 MG/DL
WBC # SPEC AUTO: 23.3 X10(3)/MCL (ref 4.5–11.5)

## 2023-03-09 PROCEDURE — 83036 HEMOGLOBIN GLYCOSYLATED A1C: CPT

## 2023-03-09 PROCEDURE — 84550 ASSAY OF BLOOD/URIC ACID: CPT

## 2023-03-09 PROCEDURE — 82746 ASSAY OF FOLIC ACID SERUM: CPT

## 2023-03-09 PROCEDURE — 84481 FREE ASSAY (FT-3): CPT

## 2023-03-09 PROCEDURE — 85027 COMPLETE CBC AUTOMATED: CPT

## 2023-03-09 PROCEDURE — 80074 ACUTE HEPATITIS PANEL: CPT

## 2023-03-09 PROCEDURE — 86141 C-REACTIVE PROTEIN HS: CPT

## 2023-03-09 PROCEDURE — 84100 ASSAY OF PHOSPHORUS: CPT

## 2023-03-09 PROCEDURE — 83550 IRON BINDING TEST: CPT

## 2023-03-09 PROCEDURE — 36415 COLL VENOUS BLD VENIPUNCTURE: CPT

## 2023-03-09 PROCEDURE — 84134 ASSAY OF PREALBUMIN: CPT

## 2023-03-09 PROCEDURE — 80053 COMPREHEN METABOLIC PANEL: CPT

## 2023-03-09 PROCEDURE — 82607 VITAMIN B-12: CPT

## 2023-03-09 PROCEDURE — 84207 ASSAY OF VITAMIN B-6: CPT | Mod: 90

## 2023-03-09 PROCEDURE — 82306 VITAMIN D 25 HYDROXY: CPT

## 2023-03-09 PROCEDURE — 84630 ASSAY OF ZINC: CPT

## 2023-03-09 PROCEDURE — 80061 LIPID PANEL: CPT

## 2023-03-09 PROCEDURE — 82525 ASSAY OF COPPER: CPT

## 2023-03-09 PROCEDURE — 84590 ASSAY OF VITAMIN A: CPT | Mod: 90

## 2023-03-09 PROCEDURE — 83921 ORGANIC ACID SINGLE QUANT: CPT

## 2023-03-09 PROCEDURE — 84443 ASSAY THYROID STIM HORMONE: CPT

## 2023-03-09 PROCEDURE — 84597 ASSAY OF VITAMIN K: CPT

## 2023-03-09 PROCEDURE — 84425 ASSAY OF VITAMIN B-1: CPT | Mod: 90

## 2023-03-09 PROCEDURE — 83735 ASSAY OF MAGNESIUM: CPT

## 2023-03-09 PROCEDURE — 84439 ASSAY OF FREE THYROXINE: CPT

## 2023-03-11 LAB
COPPER SERPL-MCNC: 133 MCG/DL (ref 77–206)
PHYTONADIONE SERPL-MCNC: 0.18 NG/ML (ref 0.1–2.2)
PYRIDOXAL PHOS SERPL-MCNC: 6 MCG/L (ref 5–50)
ZINC SERPL-MCNC: 79 MCG/DL (ref 60–106)

## 2023-03-13 LAB — VIT A SERPL-MCNC: 43 MCG/DL (ref 32.5–78)

## 2023-03-14 LAB — MAYO GENERIC ORDERABLE RESULT: NORMAL

## 2023-03-15 LAB — VIT B1 BLD-SCNC: 79 NMOL/L (ref 70–180)

## 2023-04-04 DIAGNOSIS — K21.9 ESOPHAGEAL REFLUX: Primary | ICD-10-CM

## 2023-04-04 LAB — PATH REV: NORMAL

## 2023-04-13 ENCOUNTER — HOSPITAL ENCOUNTER (OUTPATIENT)
Dept: RADIOLOGY | Facility: HOSPITAL | Age: 48
Discharge: HOME | End: 2023-04-13
Attending: SURGERY
Payer: COMMERCIAL

## 2023-04-13 DIAGNOSIS — K21.9 ESOPHAGEAL REFLUX: ICD-10-CM

## 2023-04-13 PROCEDURE — 74240 X-RAY XM UPR GI TRC 1CNTRST: CPT | Mod: TC

## 2023-04-13 PROCEDURE — 25500020 PHARM REV CODE 255: Performed by: SURGERY

## 2023-04-13 PROCEDURE — A9698 NON-RAD CONTRAST MATERIALNOC: HCPCS | Performed by: SURGERY

## 2023-04-13 RX ADMIN — BARIUM SULFATE 10 ML: 0.6 SUSPENSION ORAL at 09:04

## 2023-05-18 DIAGNOSIS — D72.829 ELEVATED WBC COUNT: Primary | ICD-10-CM

## 2023-08-11 RX ORDER — METFORMIN HYDROCHLORIDE 1000 MG/1
1000 TABLET ORAL DAILY
COMMUNITY

## 2023-08-14 ENCOUNTER — HOSPITAL ENCOUNTER (OUTPATIENT)
Dept: RADIOLOGY | Facility: HOSPITAL | Age: 48
Discharge: HOME | End: 2023-08-14
Attending: SURGERY
Payer: COMMERCIAL

## 2023-08-14 DIAGNOSIS — E11.9 DIABETES: ICD-10-CM

## 2023-08-14 DIAGNOSIS — Z01.818 OTHER SPECIFIED PRE-OPERATIVE EXAMINATION: ICD-10-CM

## 2023-08-14 DIAGNOSIS — Z01.818 OTHER SPECIFIED PRE-OPERATIVE EXAMINATION: Primary | ICD-10-CM

## 2023-08-14 DIAGNOSIS — C95.90 LEUKEMIA: Primary | ICD-10-CM

## 2023-08-14 DIAGNOSIS — E78.5 DYSLIPIDEMIA: ICD-10-CM

## 2023-08-14 DIAGNOSIS — Z51.81 MEDICATION MONITORING ENCOUNTER: ICD-10-CM

## 2023-08-14 PROCEDURE — 71046 X-RAY EXAM CHEST 2 VIEWS: CPT | Mod: TC

## 2023-08-14 PROCEDURE — 88185 FLOWCYTOMETRY/TC ADD-ON: CPT | Performed by: PATHOLOGY

## 2023-08-14 PROCEDURE — 88184 FLOWCYTOMETRY/ TC 1 MARKER: CPT

## 2023-08-15 ENCOUNTER — LAB REQUISITION (OUTPATIENT)
Dept: LAB | Facility: HOSPITAL | Age: 48
End: 2023-08-15
Attending: PATHOLOGY
Payer: COMMERCIAL

## 2023-08-15 ENCOUNTER — PATIENT MESSAGE (OUTPATIENT)
Dept: ADMINISTRATIVE | Facility: OTHER | Age: 48
End: 2023-08-15
Payer: COMMERCIAL

## 2023-08-15 DIAGNOSIS — D72.820 LYMPHOCYTOSIS (SYMPTOMATIC): ICD-10-CM

## 2023-08-15 PROBLEM — R59.0 AXILLARY LYMPHADENOPATHY: Status: ACTIVE | Noted: 2023-08-15

## 2023-08-15 PROBLEM — D72.829 LEUKOCYTOSIS: Chronic | Status: ACTIVE | Noted: 2023-08-15

## 2023-08-15 NOTE — DISCHARGE INSTRUCTIONS
Dr. Styles's Sutured Wound Care Instructions:    - Keep surgical dressing clean and dry for 48 hours post op, then ok to remove dressing and shower.  - Wash incision daily with antibacterial soap and water. Pat dry.   - Do not remove steri strips for 5-7 days post op. After post op day 7, ok to remove steri strips once edges of steri strips begin to curl. Do not keep steri strips in place longer than 10 days after surgery.   - Call Dr. Styles's office with any questions or concerns regarding wound care/incisions. [phone].

## 2023-08-16 ENCOUNTER — PATIENT MESSAGE (OUTPATIENT)
Dept: ADMINISTRATIVE | Facility: OTHER | Age: 48
End: 2023-08-16
Payer: COMMERCIAL

## 2023-08-17 ENCOUNTER — PATIENT MESSAGE (OUTPATIENT)
Dept: ADMINISTRATIVE | Facility: OTHER | Age: 48
End: 2023-08-17
Payer: COMMERCIAL

## 2023-08-17 LAB — MAYO GENERIC ORDERABLE RESULT: NORMAL

## 2023-08-18 ENCOUNTER — LAB REQUISITION (OUTPATIENT)
Dept: LAB | Facility: HOSPITAL | Age: 48
End: 2023-08-18
Payer: COMMERCIAL

## 2023-08-18 ENCOUNTER — ANESTHESIA (OUTPATIENT)
Dept: SURGERY | Facility: HOSPITAL | Age: 48
End: 2023-08-18
Payer: COMMERCIAL

## 2023-08-18 ENCOUNTER — ANESTHESIA EVENT (OUTPATIENT)
Dept: SURGERY | Facility: HOSPITAL | Age: 48
End: 2023-08-18
Payer: COMMERCIAL

## 2023-08-18 ENCOUNTER — HOSPITAL ENCOUNTER (OUTPATIENT)
Facility: HOSPITAL | Age: 48
Discharge: HOME | End: 2023-08-18
Attending: SURGERY | Admitting: SURGERY
Payer: COMMERCIAL

## 2023-08-18 DIAGNOSIS — R59.9 ENLARGED LYMPH NODES, UNSPECIFIED: ICD-10-CM

## 2023-08-18 DIAGNOSIS — R59.0 AXILLARY LYMPHADENOPATHY: ICD-10-CM

## 2023-08-18 DIAGNOSIS — R59.0 LYMPHADENOPATHY, AXILLARY: ICD-10-CM

## 2023-08-18 LAB — POCT GLUCOSE: 149 MG/DL (ref 70–110)

## 2023-08-18 PROCEDURE — 36000706: Performed by: SURGERY

## 2023-08-18 PROCEDURE — 71000015 HC POSTOP RECOV 1ST HR: Performed by: SURGERY

## 2023-08-18 PROCEDURE — 71000033 HC RECOVERY, INTIAL HOUR: Performed by: SURGERY

## 2023-08-18 PROCEDURE — D9220A PRA ANESTHESIA: ICD-10-PCS | Mod: ANES,,, | Performed by: ANESTHESIOLOGY

## 2023-08-18 PROCEDURE — 37000009 HC ANESTHESIA EA ADD 15 MINS: Performed by: SURGERY

## 2023-08-18 PROCEDURE — 36000707: Performed by: SURGERY

## 2023-08-18 PROCEDURE — 63600175 PHARM REV CODE 636 W HCPCS: Performed by: ANESTHESIOLOGY

## 2023-08-18 PROCEDURE — 25000003 PHARM REV CODE 250: Performed by: NURSE PRACTITIONER

## 2023-08-18 PROCEDURE — D9220A PRA ANESTHESIA: Mod: ANES,,, | Performed by: ANESTHESIOLOGY

## 2023-08-18 PROCEDURE — 25000003 PHARM REV CODE 250: Performed by: ANESTHESIOLOGY

## 2023-08-18 PROCEDURE — 27201423 OPTIME MED/SURG SUP & DEVICES STERILE SUPPLY: Performed by: SURGERY

## 2023-08-18 PROCEDURE — 88377 M/PHMTRC ALYS ISHQUANT/SEMIQ: CPT

## 2023-08-18 PROCEDURE — 37000008 HC ANESTHESIA 1ST 15 MINUTES: Performed by: SURGERY

## 2023-08-18 PROCEDURE — 25000003 PHARM REV CODE 250: Performed by: SURGERY

## 2023-08-18 PROCEDURE — 71000039 HC RECOVERY, EACH ADD'L HOUR: Performed by: SURGERY

## 2023-08-18 PROCEDURE — 71000016 HC POSTOP RECOV ADDL HR: Performed by: SURGERY

## 2023-08-18 PROCEDURE — 25000003 PHARM REV CODE 250: Performed by: NURSE ANESTHETIST, CERTIFIED REGISTERED

## 2023-08-18 PROCEDURE — 88184 FLOWCYTOMETRY/ TC 1 MARKER: CPT

## 2023-08-18 PROCEDURE — 88185 FLOWCYTOMETRY/TC ADD-ON: CPT | Performed by: PATHOLOGY

## 2023-08-18 PROCEDURE — D9220A PRA ANESTHESIA: ICD-10-PCS | Mod: CRNA,,, | Performed by: NURSE ANESTHETIST, CERTIFIED REGISTERED

## 2023-08-18 PROCEDURE — 63600175 PHARM REV CODE 636 W HCPCS: Performed by: NURSE PRACTITIONER

## 2023-08-18 PROCEDURE — 63600175 PHARM REV CODE 636 W HCPCS: Performed by: NURSE ANESTHETIST, CERTIFIED REGISTERED

## 2023-08-18 PROCEDURE — D9220A PRA ANESTHESIA: Mod: CRNA,,, | Performed by: NURSE ANESTHETIST, CERTIFIED REGISTERED

## 2023-08-18 RX ORDER — ONDANSETRON 2 MG/ML
4 INJECTION INTRAMUSCULAR; INTRAVENOUS ONCE AS NEEDED
Status: COMPLETED | OUTPATIENT
Start: 2023-08-18 | End: 2023-08-18

## 2023-08-18 RX ORDER — METHYLENE BLUE 5 MG/ML
INJECTION INTRAVENOUS
Status: DISCONTINUED
Start: 2023-08-18 | End: 2023-08-18 | Stop reason: WASHOUT

## 2023-08-18 RX ORDER — HYDROMORPHONE HYDROCHLORIDE 2 MG/ML
0.4 INJECTION, SOLUTION INTRAMUSCULAR; INTRAVENOUS; SUBCUTANEOUS EVERY 10 MIN PRN
Status: DISCONTINUED | OUTPATIENT
Start: 2023-08-18 | End: 2023-08-18

## 2023-08-18 RX ORDER — MEPERIDINE HYDROCHLORIDE 25 MG/ML
12.5 INJECTION INTRAMUSCULAR; INTRAVENOUS; SUBCUTANEOUS EVERY 10 MIN PRN
Status: DISCONTINUED | OUTPATIENT
Start: 2023-08-18 | End: 2023-08-18 | Stop reason: HOSPADM

## 2023-08-18 RX ORDER — METHOCARBAMOL 100 MG/ML
1000 INJECTION, SOLUTION INTRAMUSCULAR; INTRAVENOUS ONCE
Status: COMPLETED | OUTPATIENT
Start: 2023-08-18 | End: 2023-08-18

## 2023-08-18 RX ORDER — BUPIVACAINE HYDROCHLORIDE AND EPINEPHRINE 2.5; 5 MG/ML; UG/ML
INJECTION, SOLUTION EPIDURAL; INFILTRATION; INTRACAUDAL; PERINEURAL
Status: DISCONTINUED | OUTPATIENT
Start: 2023-08-18 | End: 2023-08-18 | Stop reason: HOSPADM

## 2023-08-18 RX ORDER — CEFAZOLIN 2 G/1
INJECTION, POWDER, FOR SOLUTION INTRAMUSCULAR; INTRAVENOUS
Status: COMPLETED
Start: 2023-08-18 | End: 2023-08-18

## 2023-08-18 RX ORDER — PROMETHAZINE HYDROCHLORIDE 25 MG/ML
12.5 INJECTION, SOLUTION INTRAMUSCULAR; INTRAVENOUS EVERY 6 HOURS PRN
Status: DISCONTINUED | OUTPATIENT
Start: 2023-08-18 | End: 2023-08-18 | Stop reason: HOSPADM

## 2023-08-18 RX ORDER — HYDROCODONE BITARTRATE AND ACETAMINOPHEN 7.5; 325 MG/1; MG/1
1 TABLET ORAL EVERY 6 HOURS PRN
Qty: 10 TABLET | Refills: 0 | Status: SHIPPED | OUTPATIENT
Start: 2023-08-18

## 2023-08-18 RX ORDER — CEFAZOLIN SODIUM 2 G/50ML
2 SOLUTION INTRAVENOUS
Status: DISCONTINUED | OUTPATIENT
Start: 2023-08-18 | End: 2023-08-18 | Stop reason: HOSPADM

## 2023-08-18 RX ORDER — PROPOFOL 10 MG/ML
VIAL (ML) INTRAVENOUS
Status: DISCONTINUED | OUTPATIENT
Start: 2023-08-18 | End: 2023-08-18

## 2023-08-18 RX ORDER — HYDROCODONE BITARTRATE AND ACETAMINOPHEN 7.5; 325 MG/1; MG/1
1 TABLET ORAL EVERY 6 HOURS PRN
Status: DISCONTINUED | OUTPATIENT
Start: 2023-08-18 | End: 2023-08-18 | Stop reason: HOSPADM

## 2023-08-18 RX ORDER — METOCLOPRAMIDE 10 MG/1
10 TABLET ORAL
Status: COMPLETED | OUTPATIENT
Start: 2023-08-18 | End: 2023-08-18

## 2023-08-18 RX ORDER — TRAMADOL HYDROCHLORIDE 50 MG/1
50 TABLET ORAL EVERY 6 HOURS PRN
Status: DISCONTINUED | OUTPATIENT
Start: 2023-08-18 | End: 2023-08-18 | Stop reason: HOSPADM

## 2023-08-18 RX ORDER — ONDANSETRON 2 MG/ML
4 INJECTION INTRAMUSCULAR; INTRAVENOUS ONCE
Status: DISCONTINUED | OUTPATIENT
Start: 2023-08-18 | End: 2023-08-18 | Stop reason: HOSPADM

## 2023-08-18 RX ORDER — CEFAZOLIN SODIUM 1 G/3ML
INJECTION, POWDER, FOR SOLUTION INTRAMUSCULAR; INTRAVENOUS
Status: DISCONTINUED | OUTPATIENT
Start: 2023-08-18 | End: 2023-08-18

## 2023-08-18 RX ORDER — GABAPENTIN 300 MG/1
300 CAPSULE ORAL
Status: COMPLETED | OUTPATIENT
Start: 2023-08-18 | End: 2023-08-18

## 2023-08-18 RX ORDER — MIDAZOLAM HYDROCHLORIDE 1 MG/ML
2 INJECTION INTRAMUSCULAR; INTRAVENOUS ONCE
Status: COMPLETED | OUTPATIENT
Start: 2023-08-18 | End: 2023-08-18

## 2023-08-18 RX ORDER — FAMOTIDINE 20 MG/1
40 TABLET, FILM COATED ORAL ONCE
Status: COMPLETED | OUTPATIENT
Start: 2023-08-18 | End: 2023-08-18

## 2023-08-18 RX ORDER — MEPERIDINE HYDROCHLORIDE 25 MG/ML
50 INJECTION INTRAMUSCULAR; INTRAVENOUS; SUBCUTANEOUS ONCE AS NEEDED
Status: DISCONTINUED | OUTPATIENT
Start: 2023-08-18 | End: 2023-08-18 | Stop reason: HOSPADM

## 2023-08-18 RX ORDER — KETOROLAC TROMETHAMINE 30 MG/ML
INJECTION, SOLUTION INTRAMUSCULAR; INTRAVENOUS
Status: DISCONTINUED | OUTPATIENT
Start: 2023-08-18 | End: 2023-08-18

## 2023-08-18 RX ORDER — BUPIVACAINE HYDROCHLORIDE 2.5 MG/ML
INJECTION, SOLUTION EPIDURAL; INFILTRATION; INTRACAUDAL
Status: DISCONTINUED
Start: 2023-08-18 | End: 2023-08-18 | Stop reason: HOSPADM

## 2023-08-18 RX ORDER — SODIUM CHLORIDE, SODIUM LACTATE, POTASSIUM CHLORIDE, CALCIUM CHLORIDE 600; 310; 30; 20 MG/100ML; MG/100ML; MG/100ML; MG/100ML
INJECTION, SOLUTION INTRAVENOUS CONTINUOUS
Status: DISCONTINUED | OUTPATIENT
Start: 2023-08-18 | End: 2023-08-18 | Stop reason: HOSPADM

## 2023-08-18 RX ORDER — ONDANSETRON 4 MG/1
4 TABLET, ORALLY DISINTEGRATING ORAL
Status: COMPLETED | OUTPATIENT
Start: 2023-08-18 | End: 2023-08-18

## 2023-08-18 RX ORDER — ACETAMINOPHEN 500 MG
1000 TABLET ORAL EVERY 8 HOURS
Status: DISCONTINUED | OUTPATIENT
Start: 2023-08-18 | End: 2023-08-18 | Stop reason: HOSPADM

## 2023-08-18 RX ORDER — LIDOCAINE HYDROCHLORIDE 10 MG/ML
INJECTION, SOLUTION EPIDURAL; INFILTRATION; INTRACAUDAL; PERINEURAL
Status: DISCONTINUED | OUTPATIENT
Start: 2023-08-18 | End: 2023-08-18

## 2023-08-18 RX ORDER — HYDROMORPHONE HYDROCHLORIDE 2 MG/ML
0.4 INJECTION, SOLUTION INTRAMUSCULAR; INTRAVENOUS; SUBCUTANEOUS EVERY 5 MIN PRN
Status: DISCONTINUED | OUTPATIENT
Start: 2023-08-18 | End: 2023-08-18 | Stop reason: HOSPADM

## 2023-08-18 RX ORDER — ONDANSETRON 4 MG/1
4 TABLET, ORALLY DISINTEGRATING ORAL EVERY 6 HOURS PRN
Status: DISCONTINUED | OUTPATIENT
Start: 2023-08-18 | End: 2023-08-18 | Stop reason: HOSPADM

## 2023-08-18 RX ORDER — MORPHINE SULFATE 4 MG/ML
1 INJECTION, SOLUTION INTRAMUSCULAR; INTRAVENOUS
Status: DISCONTINUED | OUTPATIENT
Start: 2023-08-18 | End: 2023-08-18 | Stop reason: HOSPADM

## 2023-08-18 RX ORDER — ACETAMINOPHEN 325 MG/1
650 TABLET ORAL
Status: DISCONTINUED | OUTPATIENT
Start: 2023-08-18 | End: 2023-08-18 | Stop reason: HOSPADM

## 2023-08-18 RX ORDER — MIDAZOLAM HYDROCHLORIDE 1 MG/ML
2 INJECTION INTRAMUSCULAR; INTRAVENOUS ONCE AS NEEDED
Status: DISCONTINUED | OUTPATIENT
Start: 2023-08-18 | End: 2023-08-18 | Stop reason: HOSPADM

## 2023-08-18 RX ORDER — FENTANYL CITRATE 50 UG/ML
INJECTION, SOLUTION INTRAMUSCULAR; INTRAVENOUS
Status: DISCONTINUED | OUTPATIENT
Start: 2023-08-18 | End: 2023-08-18

## 2023-08-18 RX ORDER — ACETAMINOPHEN 500 MG
1000 TABLET ORAL
Status: COMPLETED | OUTPATIENT
Start: 2023-08-18 | End: 2023-08-18

## 2023-08-18 RX ORDER — ROCURONIUM BROMIDE 10 MG/ML
INJECTION, SOLUTION INTRAVENOUS
Status: DISCONTINUED | OUTPATIENT
Start: 2023-08-18 | End: 2023-08-18

## 2023-08-18 RX ADMIN — MIDAZOLAM HYDROCHLORIDE 2 MG: 1 INJECTION, SOLUTION INTRAMUSCULAR; INTRAVENOUS at 09:08

## 2023-08-18 RX ADMIN — LIDOCAINE HYDROCHLORIDE 50 MG: 10 INJECTION, SOLUTION EPIDURAL; INFILTRATION; INTRACAUDAL; PERINEURAL at 09:08

## 2023-08-18 RX ADMIN — SODIUM CHLORIDE, POTASSIUM CHLORIDE, SODIUM LACTATE AND CALCIUM CHLORIDE: 600; 310; 30; 20 INJECTION, SOLUTION INTRAVENOUS at 09:08

## 2023-08-18 RX ADMIN — ONDANSETRON 4 MG: 4 TABLET, ORALLY DISINTEGRATING ORAL at 09:08

## 2023-08-18 RX ADMIN — ACETAMINOPHEN 1000 MG: 500 TABLET, FILM COATED ORAL at 09:08

## 2023-08-18 RX ADMIN — FENTANYL CITRATE 50 MCG: 50 INJECTION, SOLUTION INTRAMUSCULAR; INTRAVENOUS at 10:08

## 2023-08-18 RX ADMIN — FENTANYL CITRATE 50 MCG: 50 INJECTION, SOLUTION INTRAMUSCULAR; INTRAVENOUS at 09:08

## 2023-08-18 RX ADMIN — ROCURONIUM BROMIDE 50 MG: 50 INJECTION INTRAVENOUS at 09:08

## 2023-08-18 RX ADMIN — METHOCARBAMOL 1000 MG: 100 INJECTION INTRAMUSCULAR; INTRAVENOUS at 11:08

## 2023-08-18 RX ADMIN — PROPOFOL 150 MG: 10 INJECTION, EMULSION INTRAVENOUS at 09:08

## 2023-08-18 RX ADMIN — PROPOFOL 50 MG: 10 INJECTION, EMULSION INTRAVENOUS at 10:08

## 2023-08-18 RX ADMIN — KETOROLAC TROMETHAMINE 30 MG: 30 INJECTION, SOLUTION INTRAMUSCULAR at 10:08

## 2023-08-18 RX ADMIN — ONDANSETRON 4 MG: 2 INJECTION INTRAMUSCULAR; INTRAVENOUS at 11:08

## 2023-08-18 RX ADMIN — CEFAZOLIN 2 G: 330 INJECTION, POWDER, FOR SOLUTION INTRAMUSCULAR; INTRAVENOUS at 09:08

## 2023-08-18 RX ADMIN — METOCLOPRAMIDE 10 MG: 10 TABLET ORAL at 09:08

## 2023-08-18 RX ADMIN — SUGAMMADEX 200 MG: 100 INJECTION, SOLUTION INTRAVENOUS at 10:08

## 2023-08-18 RX ADMIN — HYDROMORPHONE HYDROCHLORIDE 0.4 MG: 2 INJECTION INTRAMUSCULAR; INTRAVENOUS; SUBCUTANEOUS at 11:08

## 2023-08-18 RX ADMIN — FAMOTIDINE 40 MG: 20 TABLET, FILM COATED ORAL at 09:08

## 2023-08-18 RX ADMIN — GABAPENTIN 300 MG: 300 CAPSULE ORAL at 09:08

## 2023-08-18 NOTE — OP NOTE
[facility] - Periop Services  Operative Note      Date of Procedure: 8/18/2023     Procedure: Procedure(s) (LRB):  BIOPSY, LYMPH NODE Left  Axilla 2cm (Left)  BIOPSY, LYMPH NODE, AXILLARY (Left)     Surgeon(s) and Role:     * Sean Styles MD - Primary    Assisting Surgeon: WILNER Barlow NP    Pre-Operative Diagnosis: Lymphadenopathy, axillary [R59.0]    Post-Operative Diagnosis: Post-Op Diagnosis Codes:     * Lymphadenopathy, axillary [R59.0]    Anesthesia: General    Operative Findings (including complications, if any): left axillary adenopathy    Description of Technical Procedures: Left axilla prepped and draped.  Incision performed under hair bearing area.  Incision carried down through superficial fascia.  1.5 cm node identified.  This node was excised sharply.  Hemostasis assured.  Clips controlled lymphatic channel and blood vessel.  Wound irrigated.  Hemostasis assured.  No nerves disturbed.  Incision closed with layered vicryl.      Significant Surgical Tasks Conducted by the Assistant(s), if Applicable:      Estimated Blood Loss (EBL): * No values recorded between 8/18/2023 10:07 AM and 8/18/2023 10:40 AM *           Implants: * No implants in log *    Specimens:   Specimen (24h ago, onward)       Start     Ordered    08/18/23 1017  Specimen to Pathology  RELEASE UPON ORDERING        Comments: Specimen A: LYMPHADENOPATHY, LEUKOCYTOSIS, SUSPECT CLL     References:    Click here for ordering Quick Tip   Question:  Release to patient  Answer:  Immediate    08/18/23 1017                            Condition: Good    Disposition: PACU - hemodynamically stable.    Attestation: I was present and scrubbed for the entire procedure.    Discharge Note    OUTCOME: Patient tolerated treatment/procedure well without complication and is now ready for discharge.    DISPOSITION: Home or Self Care    FINAL DIAGNOSIS:  Axillary lymphadenopathy    FOLLOWUP: In clinic    DISCHARGE INSTRUCTIONS:  No discharge  procedures on file.     Clinical Reference Documents Added to Patient Instructions         Document    LYMPH NODE REMOVAL DISCHARGE INSTRUCTIONS (ENGLISH)

## 2023-08-18 NOTE — PROGRESS NOTES
Op site dressing x2 noted, surrounding area soft and warm to the touch, color WDL.  Ice pack placed on site.

## 2023-08-18 NOTE — ANESTHESIA PREPROCEDURE EVALUATION
"          On  OZEMPIC ( Semaglutide) stopped 1 month ago.                                                                                              08/18/2023  Cristina Lazar is a 47 y.o., female   Pre-operative evaluation for Procedure(s) (LRB):  BIOPSY, LYMPH NODE Left  Axilla 2cm (Left)  BIOPSY, LYMPH NODE, AXILLARY (Left)    Cristina Lazar is a 47 y.o. female     Patient Active Problem List   Diagnosis    Leukocytosis    Axillary lymphadenopathy       Review of patient's allergies indicates:  No Known Allergies    No current facility-administered medications on file prior to encounter.     Current Outpatient Medications on File Prior to Encounter   Medication Sig Dispense Refill    albuterol (PROAIR HFA) 90 mcg/actuation inhaler Inhale 2 puffs into the lungs every 6 (six) hours as needed for Wheezing. Rescue 8 g 0    cloNIDine 0.2 mg/24 hr td ptwk (CATAPRES) 0.2 mg/24 hr 1 patch every 7 days.      hydrALAZINE (APRESOLINE) 100 MG tablet Take 150 mg by mouth 2 (two) times daily.      metFORMIN (GLUCOPHAGE) 1000 MG tablet Take 1,000 mg by mouth 2 (two) times daily with meals.      OZEMPIC 2 mg/dose (8 mg/3 mL) PnIj Inject into the skin every 7 days.      PREMARIN vaginal cream Place vaginally.         Past Surgical History:   Procedure Laterality Date    CARPAL TUNNEL RELEASE Right     CHOLECYSTECTOMY      HYSTERECTOMY      KNEE LIGAMENT RECONSTRUCTION Right     DIANA-EN-Y PROCEDURE      rt foot Right     for neuropathy    TONSILLECTOMY AND ADENOIDECTOMY      TUBAL LIGATION       UDS Neg  CBC: H/H WNL; elev WBC     No results for input(s): "WBC", "RBC", "HGB", "HCT", "PLT", "MCV", "MCH", "MCHC" in the last 72 hours.    CMP 8/14/2023: WNL     No results for input(s): "NA", "K", "CL", "CO2", "BUN", "CREATININE", "GLU", "MG", "PHOS", "CALCIUM", "ALBUMIN", "PROT", "ALKPHOS", "ALT", "AST", "BILITOT" in the last 72 hours.    INR  No results for input(s): "PT", "INR", "PROTIME", "APTT" in the last 72 " hours.    Diagnostic Studies:  CXR : [facility].    CT Chest 7/24/2023:  trace bilateral dependent atelectasis. Lungs are otherwise clear without focal consolidation or nodular mass.    CTA 6/12/2020: Dilatation of the ascending thoracic aorta and main pulmonary artery.    EKG :      2D Echo :  No results found for this or any previous visit..      Pre-op Assessment    I have reviewed the Patient Summary Reports.     I have reviewed the Nursing Notes. I have reviewed the NPO Status.   I have reviewed the Medications.     Review of Systems  Anesthesia Hx:  No problems with previous Anesthesia  History of prior surgery of interest to airway management or planning: gastric bypass. Previous anesthesia: General Elissa en Y:; GB:; Hysterectomy:; BTL:; TA:;  with general anesthesia.  Airway issues documented on chart review include GETA  Denies Family Hx of Anesthesia complications.   Denies Personal Hx of Anesthesia complications.   Social:  Non-Smoker, Social Alcohol Use    Hematology/Oncology:  Hematology Normal      Hematology Comments: Lymphocytosis   EENT/Dental:EENT/Dental Normal   Cardiovascular:   Denies Pacemaker. Hypertension  Denies MI.    Denies Angina.  Denies HIDALGO.    Pulmonary:   Sleep Apnea, CPAP Noncompliant.    Renal/:  Renal/ Normal     Hepatic/GI:  Hepatic/GI Normal 2016 Elissa en Y procedure for weight loss:   Musculoskeletal:  Musculoskeletal Normal CTR   Neurological:   Denies TIA. Denies CVA. Headaches Denies Seizures.    Endocrine:   Diabetes  Morbid Obesity / BMI > 40  Dermatological:  Skin Normal    Psych:  Psychiatric Normal           Physical Exam  General: Cooperative, Alert and Oriented    Airway:  Mouth Opening: Normal  TM Distance: Normal  Tongue: Normal  Neck ROM: Normal ROM    Dental:  Intact, Dentures  Px denies any loose teeth.  Chest/Lungs:  Normal Respiratory Rate    Heart:  Rate: Normal  Rhythm: Regular Rhythm    Abdomen:  Normal, Soft        Anesthesia Plan  Type of Anesthesia, risks &  benefits discussed:    Anesthesia Type: Gen ETT  Intra-op Monitoring Plan: Standard ASA Monitors  Post Op Pain Control Plan: multimodal analgesia  Induction:  IV  Airway Plan: Direct, Post-Induction  Informed Consent: Informed consent signed with the Patient and all parties understand the risks and agree with anesthesia plan.  All questions answered.   ASA Score: 3  Day of Surgery Review of History & Physical: H&P Update referred to the surgeon/provider.I have interviewed and examined the patient. I have reviewed the patient's H&P dated:     Ready For Surgery From Anesthesia Perspective.     .

## 2023-08-18 NOTE — TRANSFER OF CARE
Anesthesia Transfer of Care Note    Patient: Cristina Lazar    Procedure(s) Performed: Procedure(s) (LRB):  BIOPSY, LYMPH NODE Left  Axilla 2cm (Left)  BIOPSY, LYMPH NODE, AXILLARY (Left)    Patient location: PACU    Anesthesia Type: general    Transport from OR: Transported from OR on room air with adequate spontaneous ventilation    Post pain: adequate analgesia    Post assessment: no apparent anesthetic complications    Post vital signs: stable    Level of consciousness: responds to stimulation    Nausea/Vomiting: no nausea/vomiting    Complications: none    Transfer of care protocol was followed      Last vitals:   Visit Vitals  BP (!) 143/90   Pulse (!) 54   Temp 36.7 °C (98 °F) (Oral)   Ht 6' (1.829 m)   Wt 130.2 kg (287 lb 0.6 oz)   SpO2 98%   Breastfeeding No   BMI 38.93 kg/m²

## 2023-08-18 NOTE — ANESTHESIA PROCEDURE NOTES
Intubation    Date/Time: 8/18/2023 9:52 AM    Performed by: Mariel Cali CRNA  Authorized by: Madisyn Drummond MD    Intubation:     Induction:  Intravenous    Intubated:  Postinduction    Mask Ventilation:  Easy mask    Attempts:  1    Attempted By:  CRNA    Method of Intubation:  Direct    Blade:  Bell 2    Laryngeal View Grade: Grade I - full view of cords      Difficult Airway Encountered?: No      Complications:  None    Airway Device:  Oral endotracheal tube    Airway Device Size:  7.0    Style/Cuff Inflation:  Cuffed (inflated to minimal occlusive pressure)    Inflation Amount (mL):  3    Tube secured:  21    Secured at:  The lips    Placement Verified By:  Capnometry    Complicating Factors:  None    Findings Post-Intubation:  BS equal bilateral

## 2023-08-18 NOTE — PLAN OF CARE
Problem: Obstructive Sleep Apnea Risk or Actual Comorbidity Management  Goal: Unobstructed Breathing During Sleep  8/18/2023 1336 by Perla Whitfield RN  Outcome: Met  8/18/2023 1206 by Perla Whitfield RN  Outcome: Ongoing, Progressing     Problem: Diabetes Comorbidity  Goal: Blood Glucose Level Within Targeted Range  8/18/2023 1336 by Perla Whitfield RN  Outcome: Met  8/18/2023 1206 by Perla Whitfield RN  Outcome: Ongoing, Progressing     Problem: Pain Acute  Goal: Acceptable Pain Control and Functional Ability  8/18/2023 1336 by Perla Whitfield RN  Outcome: Met  8/18/2023 1206 by Perla Whitfield RN  Outcome: Ongoing, Progressing     Problem: Fall Injury Risk  Goal: Absence of Fall and Fall-Related Injury  8/18/2023 1336 by Perla Whitfield RN  Outcome: Met  8/18/2023 1206 by Perla Whitfield RN  Outcome: Ongoing, Progressing     Problem: Infection  Goal: Absence of Infection Signs and Symptoms  8/18/2023 1336 by Perla Whitfield RN  Outcome: Met  8/18/2023 1206 by Perla Whitfield RN  Outcome: Ongoing, Progressing     Problem: Adult Inpatient Plan of Care  Goal: Plan of Care Review  8/18/2023 1336 by Perla Whitfield RN  Outcome: Met  8/18/2023 1206 by Perla Whitfield RN  Outcome: Ongoing, Progressing  Goal: Patient-Specific Goal (Individualized)  8/18/2023 1336 by Perla Whitfield RN  Outcome: Met  8/18/2023 1206 by Perla Whitfield RN  Outcome: Ongoing, Progressing  Goal: Absence of Hospital-Acquired Illness or Injury  8/18/2023 1336 by Perla Whitfield RN  Outcome: Met  8/18/2023 1206 by Perla Whitfield RN  Outcome: Ongoing, Progressing  Goal: Optimal Comfort and Wellbeing  8/18/2023 1336 by Perla Whitfield RN  Outcome: Met  8/18/2023 1206 by Perla Whitfield, RN  Outcome: Ongoing, Progressing  Goal: Readiness for Transition of Care  8/18/2023 1336 by Perla Whitfield, RN  Outcome: Met  8/18/2023 1206 by Perla Whitfield, RN  Outcome: Ongoing, Progressing      Problem: Bariatric Environmental Safety  Goal: Safety Maintained with Care  8/18/2023 1336 by Perla Whitfield, RN  Outcome: Met  8/18/2023 1206 by Perla Whitfield, RN  Outcome: Ongoing, Progressing

## 2023-08-18 NOTE — PLAN OF CARE
Problem: Obstructive Sleep Apnea Risk or Actual Comorbidity Management  Goal: Unobstructed Breathing During Sleep  Outcome: Ongoing, Progressing     Problem: Diabetes Comorbidity  Goal: Blood Glucose Level Within Targeted Range  Outcome: Ongoing, Progressing     Problem: Pain Acute  Goal: Acceptable Pain Control and Functional Ability  Outcome: Ongoing, Progressing     Problem: Fall Injury Risk  Goal: Absence of Fall and Fall-Related Injury  Outcome: Ongoing, Progressing     Problem: Infection  Goal: Absence of Infection Signs and Symptoms  Outcome: Ongoing, Progressing     Problem: Adult Inpatient Plan of Care  Goal: Plan of Care Review  Outcome: Ongoing, Progressing  Goal: Patient-Specific Goal (Individualized)  Outcome: Ongoing, Progressing  Goal: Absence of Hospital-Acquired Illness or Injury  Outcome: Ongoing, Progressing  Goal: Optimal Comfort and Wellbeing  Outcome: Ongoing, Progressing  Goal: Readiness for Transition of Care  Outcome: Ongoing, Progressing     Problem: Bariatric Environmental Safety  Goal: Safety Maintained with Care  Outcome: Ongoing, Progressing

## 2023-08-18 NOTE — ANESTHESIA POSTPROCEDURE EVALUATION
Anesthesia Post Evaluation    Patient: Cristina Lazar    Procedure(s) Performed: Procedure(s) (LRB):  BIOPSY, LYMPH NODE Left  Axilla 2cm (Left)  BIOPSY, LYMPH NODE, AXILLARY (Left)    Final Anesthesia Type: general      Patient location during evaluation: PACU  Patient participation: Yes- Able to Participate  Level of consciousness: awake and alert, awake and oriented  Post-procedure vital signs: reviewed and stable  Pain management: adequate  Airway patency: patent    PONV status at discharge: No PONV  Anesthetic complications: no      Cardiovascular status: blood pressure returned to baseline, hemodynamically stable and stable  Respiratory status: unassisted, spontaneous ventilation and room air  Hydration status: euvolemic  Follow-up not needed.          Vitals Value Taken Time   /83 08/18/23 1217   Temp 36.2 °C (97.2 °F) 08/18/23 1045   Pulse 81 08/18/23 1217   Resp 17 08/18/23 1141   SpO2 94 % 08/18/23 1217   Vitals shown include unvalidated device data.      Event Time   Out of Recovery 11:44:00         Pain/Elizabeth Score: Pain Rating Prior to Med Admin: 6 (8/18/2023 11:06 AM)  Elizabeth Score: 10 (8/18/2023 11:45 AM)

## 2023-08-18 NOTE — BRIEF OP NOTE
[facility] - Periop Services  Brief Operative Note    Surgery Date: 8/18/2023     Surgeon(s) and Role:     * Sean Styles MD - Primary    Assisting Surgeon: KATHY Barlow NP     Pre-op Diagnosis:  Lymphadenopathy, axillary [R59.0]  Leukocytosis  Suspect CCL (per hematologist)    Post-op Diagnosis:  Post-Op Diagnosis Codes:     * Lymphadenopathy, axillary [R59.0]  same    Procedure(s) (LRB):  BIOPSY, LYMPH NODE Left  Axilla 2cm (Left)  BIOPSY, LYMPH NODE, AXILLARY (Left)  Left axillary lymph node biopsy 2 cm    Anesthesia: General    Operative Findings: dictated per surgeon  Fresh lymph node protocol used for diagnostic purposed. Hematology note states suspicious for CLL.     Estimated Blood Loss: 10 cc         Specimens:   Specimen (24h ago, onward)       Start     Ordered    08/18/23 1017  Specimen to Pathology  RELEASE UPON ORDERING        Comments: Specimen A: LYMPHADENOPATHY, LEUKOCYTOSIS, SUSPECT CLL     References:    Click here for ordering Quick Tip   Question:  Release to patient  Answer:  Immediate    08/18/23 1017                      Discharge Note    OUTCOME: Patient tolerated treatment/procedure well without complication and is now ready for discharge.    DISPOSITION: Home or Self Care    FINAL DIAGNOSIS:  Axillary lymphadenopathy    FOLLOWUP: In clinic    DISCHARGE INSTRUCTIONS:  DC instructions given to pt     Clinical Reference Documents Added to Patient Instructions         Document    LYMPH NODE REMOVAL DISCHARGE INSTRUCTIONS (ENGLISH)

## 2023-08-19 VITALS
RESPIRATION RATE: 16 BRPM | SYSTOLIC BLOOD PRESSURE: 128 MMHG | BODY MASS INDEX: 38.88 KG/M2 | DIASTOLIC BLOOD PRESSURE: 87 MMHG | TEMPERATURE: 97 F | HEIGHT: 72 IN | WEIGHT: 287.06 LBS | HEART RATE: 71 BPM | OXYGEN SATURATION: 97 %

## 2023-08-19 LAB — POCT GLUCOSE: 88 MG/DL (ref 70–110)

## 2023-08-21 LAB — MAYO GENERIC ORDERABLE RESULT: NORMAL

## 2023-08-31 ENCOUNTER — HOSPITAL ENCOUNTER (INPATIENT)
Facility: HOSPITAL | Age: 48
LOS: 5 days | Discharge: HOME | DRG: 392 | End: 2023-09-05
Attending: EMERGENCY MEDICINE | Admitting: INTERNAL MEDICINE
Payer: COMMERCIAL

## 2023-08-31 DIAGNOSIS — R10.10 UPPER ABDOMINAL PAIN: ICD-10-CM

## 2023-08-31 DIAGNOSIS — C83.00 SMALL CELL B-CELL LYMPHOMA: ICD-10-CM

## 2023-08-31 DIAGNOSIS — K52.9 COLITIS: Primary | ICD-10-CM

## 2023-08-31 DIAGNOSIS — C85.10 LOW GRADE B-CELL LYMPHOMA: ICD-10-CM

## 2023-08-31 DIAGNOSIS — R50.9 FEVER: ICD-10-CM

## 2023-08-31 LAB
ALBUMIN SERPL-MCNC: 4 G/DL (ref 3.5–5)
ALBUMIN/GLOB SERPL: 1.2 RATIO (ref 1.1–2)
ALP SERPL-CCNC: 84 UNIT/L (ref 40–150)
ALT SERPL-CCNC: 24 UNIT/L (ref 0–55)
APPEARANCE UR: CLEAR
AST SERPL-CCNC: 14 UNIT/L (ref 5–34)
BACTERIA #/AREA URNS AUTO: NORMAL /HPF
BASOPHILS # BLD AUTO: 0.04 X10(3)/MCL
BASOPHILS NFR BLD AUTO: 0.3 %
BILIRUB SERPL-MCNC: 0.8 MG/DL
BILIRUB UR QL STRIP.AUTO: NEGATIVE
BUN SERPL-MCNC: 10.4 MG/DL (ref 7–18.7)
CALCIUM SERPL-MCNC: 9.4 MG/DL (ref 8.4–10.2)
CHLORIDE SERPL-SCNC: 107 MMOL/L (ref 98–107)
CO2 SERPL-SCNC: 25 MMOL/L (ref 22–29)
COLOR UR: YELLOW
CREAT SERPL-MCNC: 0.83 MG/DL (ref 0.55–1.02)
EOSINOPHIL # BLD AUTO: 0.18 X10(3)/MCL (ref 0–0.9)
EOSINOPHIL NFR BLD AUTO: 1.2 %
ERYTHROCYTE [DISTWIDTH] IN BLOOD BY AUTOMATED COUNT: 14.6 % (ref 11.5–17)
FLUAV AG UPPER RESP QL IA.RAPID: NOT DETECTED
FLUBV AG UPPER RESP QL IA.RAPID: NOT DETECTED
GFR SERPLBLD CREATININE-BSD FMLA CKD-EPI: >60 MLS/MIN/1.73/M2
GLOBULIN SER-MCNC: 3.3 GM/DL (ref 2.4–3.5)
GLUCOSE SERPL-MCNC: 119 MG/DL (ref 74–100)
GLUCOSE UR QL STRIP.AUTO: NEGATIVE
HCT VFR BLD AUTO: 42.8 % (ref 37–47)
HGB BLD-MCNC: 13.9 G/DL (ref 12–16)
IMM GRANULOCYTES # BLD AUTO: 0.08 X10(3)/MCL (ref 0–0.04)
IMM GRANULOCYTES NFR BLD AUTO: 0.5 %
KETONES UR QL STRIP.AUTO: NEGATIVE
LACTATE SERPL-SCNC: 1.5 MMOL/L (ref 0.5–2.2)
LEUKOCYTE ESTERASE UR QL STRIP.AUTO: NEGATIVE
LIPASE SERPL-CCNC: 15 U/L
LYMPHOCYTES # BLD AUTO: 4.73 X10(3)/MCL (ref 0.6–4.6)
LYMPHOCYTES NFR BLD AUTO: 30.5 %
MCH RBC QN AUTO: 27.8 PG (ref 27–31)
MCHC RBC AUTO-ENTMCNC: 32.5 G/DL (ref 33–36)
MCV RBC AUTO: 85.6 FL (ref 80–94)
MONOCYTES # BLD AUTO: 0.72 X10(3)/MCL (ref 0.1–1.3)
MONOCYTES NFR BLD AUTO: 4.6 %
NEUTROPHILS # BLD AUTO: 9.77 X10(3)/MCL (ref 2.1–9.2)
NEUTROPHILS NFR BLD AUTO: 62.9 %
NITRITE UR QL STRIP.AUTO: NEGATIVE
NRBC BLD AUTO-RTO: 0 %
PH UR STRIP.AUTO: 5 [PH]
PLATELET # BLD AUTO: 251 X10(3)/MCL (ref 130–400)
PMV BLD AUTO: 10.9 FL (ref 7.4–10.4)
POCT GLUCOSE: 103 MG/DL (ref 70–110)
POTASSIUM SERPL-SCNC: 3.7 MMOL/L (ref 3.5–5.1)
PROT SERPL-MCNC: 7.3 GM/DL (ref 6.4–8.3)
PROT UR QL STRIP.AUTO: ABNORMAL
RBC # BLD AUTO: 5 X10(6)/MCL (ref 4.2–5.4)
RBC #/AREA URNS AUTO: NORMAL /HPF
RBC UR QL AUTO: NEGATIVE
SARS-COV-2 RNA RESP QL NAA+PROBE: NOT DETECTED
SODIUM SERPL-SCNC: 142 MMOL/L (ref 136–145)
SP GR UR STRIP.AUTO: >=1.04 (ref 1–1.03)
SQUAMOUS #/AREA URNS AUTO: <5 /HPF
TROPONIN I SERPL-MCNC: <0.01 NG/ML (ref 0–0.04)
UROBILINOGEN UR STRIP-ACNC: 0.2
WBC # SPEC AUTO: 15.52 X10(3)/MCL (ref 4.5–11.5)
WBC #/AREA URNS AUTO: <5 /HPF

## 2023-08-31 PROCEDURE — 96376 TX/PRO/DX INJ SAME DRUG ADON: CPT

## 2023-08-31 PROCEDURE — 84484 ASSAY OF TROPONIN QUANT: CPT | Performed by: NURSE PRACTITIONER

## 2023-08-31 PROCEDURE — 96365 THER/PROPH/DIAG IV INF INIT: CPT

## 2023-08-31 PROCEDURE — 25000003 PHARM REV CODE 250: Performed by: NURSE PRACTITIONER

## 2023-08-31 PROCEDURE — 93005 ELECTROCARDIOGRAM TRACING: CPT

## 2023-08-31 PROCEDURE — 99285 EMERGENCY DEPT VISIT HI MDM: CPT | Mod: 25

## 2023-08-31 PROCEDURE — 85025 COMPLETE CBC W/AUTO DIFF WBC: CPT | Performed by: PHYSICIAN ASSISTANT

## 2023-08-31 PROCEDURE — 83605 ASSAY OF LACTIC ACID: CPT | Performed by: NURSE PRACTITIONER

## 2023-08-31 PROCEDURE — 36415 COLL VENOUS BLD VENIPUNCTURE: CPT

## 2023-08-31 PROCEDURE — 83690 ASSAY OF LIPASE: CPT | Performed by: PHYSICIAN ASSISTANT

## 2023-08-31 PROCEDURE — 11000001 HC ACUTE MED/SURG PRIVATE ROOM

## 2023-08-31 PROCEDURE — 63600175 PHARM REV CODE 636 W HCPCS: Performed by: PHYSICIAN ASSISTANT

## 2023-08-31 PROCEDURE — 80053 COMPREHEN METABOLIC PANEL: CPT | Performed by: PHYSICIAN ASSISTANT

## 2023-08-31 PROCEDURE — 87040 BLOOD CULTURE FOR BACTERIA: CPT | Performed by: NURSE PRACTITIONER

## 2023-08-31 PROCEDURE — 25000003 PHARM REV CODE 250: Performed by: PHYSICIAN ASSISTANT

## 2023-08-31 PROCEDURE — 63600175 PHARM REV CODE 636 W HCPCS: Performed by: NURSE PRACTITIONER

## 2023-08-31 PROCEDURE — 81001 URINALYSIS AUTO W/SCOPE: CPT | Performed by: PHYSICIAN ASSISTANT

## 2023-08-31 PROCEDURE — 0240U COVID/FLU A&B PCR: CPT | Performed by: PHYSICIAN ASSISTANT

## 2023-08-31 PROCEDURE — 96361 HYDRATE IV INFUSION ADD-ON: CPT

## 2023-08-31 PROCEDURE — 93010 ELECTROCARDIOGRAM REPORT: CPT | Mod: ,,, | Performed by: INTERNAL MEDICINE

## 2023-08-31 PROCEDURE — 25500020 PHARM REV CODE 255: Performed by: EMERGENCY MEDICINE

## 2023-08-31 PROCEDURE — 93010 EKG 12-LEAD: ICD-10-PCS | Mod: ,,, | Performed by: INTERNAL MEDICINE

## 2023-08-31 PROCEDURE — 96375 TX/PRO/DX INJ NEW DRUG ADDON: CPT

## 2023-08-31 RX ORDER — ACETAMINOPHEN 500 MG
1000 TABLET ORAL
Status: COMPLETED | OUTPATIENT
Start: 2023-08-31 | End: 2023-08-31

## 2023-08-31 RX ORDER — TALC
6 POWDER (GRAM) TOPICAL NIGHTLY PRN
Status: DISCONTINUED | OUTPATIENT
Start: 2023-08-31 | End: 2023-09-05 | Stop reason: HOSPADM

## 2023-08-31 RX ORDER — SODIUM CHLORIDE 9 MG/ML
INJECTION, SOLUTION INTRAVENOUS CONTINUOUS
Status: DISCONTINUED | OUTPATIENT
Start: 2023-08-31 | End: 2023-09-05 | Stop reason: HOSPADM

## 2023-08-31 RX ORDER — ONDANSETRON 2 MG/ML
4 INJECTION INTRAMUSCULAR; INTRAVENOUS
Status: COMPLETED | OUTPATIENT
Start: 2023-08-31 | End: 2023-08-31

## 2023-08-31 RX ORDER — INSULIN ASPART 100 [IU]/ML
0-10 INJECTION, SOLUTION INTRAVENOUS; SUBCUTANEOUS
Status: DISCONTINUED | OUTPATIENT
Start: 2023-08-31 | End: 2023-09-05 | Stop reason: HOSPADM

## 2023-08-31 RX ORDER — IBUPROFEN 200 MG
16 TABLET ORAL
Status: DISCONTINUED | OUTPATIENT
Start: 2023-08-31 | End: 2023-09-05 | Stop reason: HOSPADM

## 2023-08-31 RX ORDER — HYDROMORPHONE HYDROCHLORIDE 2 MG/ML
1 INJECTION, SOLUTION INTRAMUSCULAR; INTRAVENOUS; SUBCUTANEOUS
Status: COMPLETED | OUTPATIENT
Start: 2023-08-31 | End: 2023-08-31

## 2023-08-31 RX ORDER — ONDANSETRON 2 MG/ML
4 INJECTION INTRAMUSCULAR; INTRAVENOUS EVERY 8 HOURS PRN
Status: DISCONTINUED | OUTPATIENT
Start: 2023-08-31 | End: 2023-09-05 | Stop reason: HOSPADM

## 2023-08-31 RX ORDER — IBUPROFEN 200 MG
24 TABLET ORAL
Status: DISCONTINUED | OUTPATIENT
Start: 2023-08-31 | End: 2023-09-05 | Stop reason: HOSPADM

## 2023-08-31 RX ORDER — METRONIDAZOLE 500 MG/100ML
500 INJECTION, SOLUTION INTRAVENOUS
Status: DISCONTINUED | OUTPATIENT
Start: 2023-08-31 | End: 2023-09-04

## 2023-08-31 RX ORDER — ACETAMINOPHEN 325 MG/1
650 TABLET ORAL EVERY 8 HOURS PRN
Status: DISCONTINUED | OUTPATIENT
Start: 2023-08-31 | End: 2023-09-05 | Stop reason: HOSPADM

## 2023-08-31 RX ORDER — SODIUM CHLORIDE 0.9 % (FLUSH) 0.9 %
10 SYRINGE (ML) INJECTION
Status: DISCONTINUED | OUTPATIENT
Start: 2023-08-31 | End: 2023-09-05 | Stop reason: HOSPADM

## 2023-08-31 RX ORDER — CIPROFLOXACIN 2 MG/ML
400 INJECTION, SOLUTION INTRAVENOUS
Status: DISCONTINUED | OUTPATIENT
Start: 2023-08-31 | End: 2023-09-04

## 2023-08-31 RX ORDER — MORPHINE SULFATE 4 MG/ML
2 INJECTION, SOLUTION INTRAMUSCULAR; INTRAVENOUS EVERY 4 HOURS PRN
Status: DISCONTINUED | OUTPATIENT
Start: 2023-08-31 | End: 2023-09-05 | Stop reason: HOSPADM

## 2023-08-31 RX ORDER — HYDROMORPHONE HYDROCHLORIDE 2 MG/ML
1 INJECTION, SOLUTION INTRAMUSCULAR; INTRAVENOUS; SUBCUTANEOUS EVERY 4 HOURS PRN
Status: DISCONTINUED | OUTPATIENT
Start: 2023-08-31 | End: 2023-09-05 | Stop reason: HOSPADM

## 2023-08-31 RX ORDER — GLUCAGON 1 MG
1 KIT INJECTION
Status: DISCONTINUED | OUTPATIENT
Start: 2023-08-31 | End: 2023-09-05 | Stop reason: HOSPADM

## 2023-08-31 RX ADMIN — HYDROMORPHONE HYDROCHLORIDE 1 MG: 2 INJECTION, SOLUTION INTRAMUSCULAR; INTRAVENOUS; SUBCUTANEOUS at 04:08

## 2023-08-31 RX ADMIN — HYDROMORPHONE HYDROCHLORIDE 1 MG: 2 INJECTION, SOLUTION INTRAMUSCULAR; INTRAVENOUS; SUBCUTANEOUS at 08:08

## 2023-08-31 RX ADMIN — CIPROFLOXACIN 400 MG: 2 INJECTION, SOLUTION INTRAVENOUS at 10:08

## 2023-08-31 RX ADMIN — SODIUM CHLORIDE: 9 INJECTION, SOLUTION INTRAVENOUS at 10:08

## 2023-08-31 RX ADMIN — SODIUM CHLORIDE, POTASSIUM CHLORIDE, SODIUM LACTATE AND CALCIUM CHLORIDE 1000 ML: 600; 310; 30; 20 INJECTION, SOLUTION INTRAVENOUS at 02:08

## 2023-08-31 RX ADMIN — ACETAMINOPHEN 1000 MG: 500 TABLET, FILM COATED ORAL at 02:08

## 2023-08-31 RX ADMIN — ONDANSETRON 4 MG: 2 INJECTION INTRAMUSCULAR; INTRAVENOUS at 02:08

## 2023-08-31 RX ADMIN — SODIUM CHLORIDE, POTASSIUM CHLORIDE, SODIUM LACTATE AND CALCIUM CHLORIDE 1000 ML: 600; 310; 30; 20 INJECTION, SOLUTION INTRAVENOUS at 05:08

## 2023-08-31 RX ADMIN — IOPAMIDOL 100 ML: 755 INJECTION, SOLUTION INTRAVENOUS at 05:08

## 2023-08-31 RX ADMIN — ONDANSETRON 4 MG: 2 INJECTION INTRAMUSCULAR; INTRAVENOUS at 05:08

## 2023-08-31 RX ADMIN — PIPERACILLIN AND TAZOBACTAM 4.5 G: 4; .5 INJECTION, POWDER, LYOPHILIZED, FOR SOLUTION INTRAVENOUS; PARENTERAL at 05:08

## 2023-08-31 NOTE — FIRST PROVIDER EVALUATION
Medical screening examination initiated.  I have conducted a focused provider triage encounter, findings are as follows:    Brief history of present illness:  47-year-old female with lymphoma presents to ED for evaluation of abdominal pain nausea and vomiting worsening for the past several days.  Complains of generalized fatigue. Recent lymph node removal    Vitals:    08/31/23 1334   BP: 139/89   Pulse: 104   Resp: 18   Temp: (!) 101.5 °F (38.6 °C)   TempSrc: Oral   SpO2: 97%   Weight: 130.2 kg (287 lb)       Pertinent physical exam:  labs, CXR, COVID/FLU    Brief workup plan:  labs, UA, covid/flu, CXR, IVF, zofran    Preliminary workup initiated; this workup will be continued and followed by the physician or advanced practice provider that is assigned to the patient when roomed.

## 2023-08-31 NOTE — ED PROVIDER NOTES
Encounter Date: 8/31/2023       History     Chief Complaint   Patient presents with    Nausea     Nausea with vomiting x3 episodes /body aches/chills/fevers/abdominal pain that started last night. Lymph node removal on Friday. Recently diagnosed with cancer, has not yet started treatment. HX of DM     See MDM    The history is provided by the patient. No  was used.     Review of patient's allergies indicates:  No Known Allergies  Past Medical History:   Diagnosis Date    Arthritis     CLL (chronic lymphocytic leukemia)     Diabetes mellitus     Hypertension     Migraines     Sleep apnea     cpap    Thyroid disease     states recently instructed she could stop thyroid med.     Past Surgical History:   Procedure Laterality Date    BIOPSY OF AXILLARY NODE Left 8/18/2023    Procedure: BIOPSY, LYMPH NODE, AXILLARY;  Surgeon: Sean Styles MD;  Location: [facility] OR;  Service: General;  Laterality: Left;    CARPAL TUNNEL RELEASE Right     CHOLECYSTECTOMY      HYSTERECTOMY      KNEE LIGAMENT RECONSTRUCTION Right     LYMPH NODE BIOPSY Left 8/18/2023    Procedure: BIOPSY, LYMPH NODE Left  Axilla 2cm;  Surgeon: Sean Styles MD;  Location: [facility] OR;  Service: General;  Laterality: Left;    DIANA-EN-Y PROCEDURE      rt foot Right     for neuropathy    TONSILLECTOMY AND ADENOIDECTOMY      TUBAL LIGATION       Family History   Problem Relation Age of Onset    Diabetes Mother     Heart failure Father      Social History     Tobacco Use    Smoking status: Never    Smokeless tobacco: Never   Substance Use Topics    Alcohol use: Yes     Comment: wine socially    Drug use: Never     Review of Systems   Constitutional:  Positive for chills and fever.   Respiratory:  Negative for cough and shortness of breath.    Cardiovascular:  Negative for chest pain.   Gastrointestinal:  Positive for abdominal pain.   Genitourinary:  Negative for difficulty urinating and dysuria.   Musculoskeletal:  Positive for myalgias.  Negative for gait problem.   Skin:  Negative for color change.   Neurological:  Positive for headaches. Negative for dizziness and speech difficulty.   Psychiatric/Behavioral:  Negative for hallucinations and suicidal ideas.    All other systems reviewed and are negative.      Physical Exam     Initial Vitals [08/31/23 1334]   BP Pulse Resp Temp SpO2   139/89 104 18 (!) 101.5 °F (38.6 °C) 97 %      MAP       --         Physical Exam    Nursing note and vitals reviewed.  Constitutional: She appears well-developed and well-nourished.   HENT:   Head: Normocephalic.   Eyes: EOM are normal.   Neck:   Normal range of motion.  Cardiovascular:  Normal rate, regular rhythm, normal heart sounds and intact distal pulses.           Pulmonary/Chest: Breath sounds normal. No respiratory distress.   Abdominal: Abdomen is soft. Bowel sounds are normal. There is no abdominal tenderness.   Musculoskeletal:         General: Normal range of motion.      Cervical back: Normal range of motion.     Neurological: She is alert and oriented to person, place, and time. She has normal strength.   Skin: Skin is warm and dry.   Left axilla biopsy site well approx. No drainage. No erythema.    Psychiatric: She has a normal mood and affect. Her behavior is normal. Judgment and thought content normal.         ED Course   Procedures  Labs Reviewed   COMPREHENSIVE METABOLIC PANEL - Abnormal; Notable for the following components:       Result Value    Glucose Level 119 (*)     All other components within normal limits   URINALYSIS, REFLEX TO URINE CULTURE - Abnormal; Notable for the following components:    Specific Gravity, UA >=1.040 (*)     Protein, UA Trace (*)     All other components within normal limits   CBC WITH DIFFERENTIAL - Abnormal; Notable for the following components:    WBC 15.52 (*)     MCHC 32.5 (*)     MPV 10.9 (*)     Lymph # 4.73 (*)     Neut # 9.77 (*)     IG# 0.08 (*)     All other components within normal limits   LIPASE -  Normal   COVID/FLU A&B PCR - Normal    Narrative:     The Xpert Xpress SARS-CoV-2/FLU/RSV plus is a rapid, multiplexed real-time PCR test intended for the simultaneous qualitative detection and differentiation of SARS-CoV-2, Influenza A, Influenza B, and respiratory syncytial virus (RSV) viral RNA in either nasopharyngeal swab or nasal swab specimens.         TROPONIN I - Normal   LACTIC ACID, PLASMA - Normal   URINALYSIS, MICROSCOPIC - Normal   BLOOD CULTURE OLG   BLOOD CULTURE OLG   CBC W/ AUTO DIFFERENTIAL    Narrative:     The following orders were created for panel order CBC auto differential.  Procedure                               Abnormality         Status                     ---------                               -----------         ------                     CBC with Differential[[id number]]        Abnormal            Final result                 Please view results for these tests on the individual orders.     EKG Readings: (Independently Interpreted)   Rhythm: Normal Sinus Rhythm. Heart Rate: 84. Ectopy: No Ectopy. Conduction: Normal. ST Segments: Normal ST Segments. T Waves: Normal. Axis: Normal. Clinical Impression: Normal Sinus Rhythm       Imaging Results               CT Chest Abdomen Pelvis With Contrast (xpd) (Final result)  Result time 08/31/23 17:43:14      Final result by Ray Zheng MD (08/31/23 17:43:14)                   Narrative:    EXAMINATION  CT CHEST ABDOMEN PELVIS WITH CONTRAST (XPD)    CLINICAL HISTORY  /upper abdominal pain/left-sided chest pain;    TECHNIQUE  Post-contrast helical-acquisition CT images were obtained and multiplanar reformats accomplished by a CT technologist at a separate workstation and pushed to PACS for physician review.    CONTRAST  *IV: ISOVUE-370, 100 mL  *Enteric: none    COMPARISON  *Chest CT dated 11 June 2020  *Abdominopelvic CT dated 14 December 2018    FINDINGS  Images were reviewed in soft tissue, lung, and bone windows.    Exam quality: adequate  for evaluation    Lines/tubes: none visualized    There is similar dilatation of the main pulmonary artery, with no obvious intraluminal filling defect identified within limitations of exam protocol and contrast timing.  The thoracoabdominal aorta and visualized arch, upper abdominal, and pelvic branch vessels are without acute or focal abnormality.  Scattered atherosclerotic calcification again noted.  Heart chambers are normal volume.  There is no significant pericardial fluid.    Central airway patency is widely maintained.  No acute airspace consolidation or suspicious focal lung lesion.  No pleural effusion or pneumothorax.    Gallbladder is surgically absent, similar to the prior.  Minimal prominence of the intrahepatic and extrahepatic biliary ducts likely compensatory due to absence of the gallbladder.  Portal vein is widely patent.  No acute or suspicious focal abnormality of the upper abdominal solid organs.  There is no evidence of distal obstructive uropathy.  Urinary bladder is normal in appearance.  Uterus is absent.  No suspicious adnexal lesion.    Esophagus is nondistended.  There are similar postoperative changes of the stomach consistent with gastric bypass.  Notable mildly prominent fluid-filled loops of small bowel are present throughout the abdomen, without abnormal air-fluid levels or discrete transition point to suggest high-grade mechanical obstruction.  Fluid and associated air levels are also noted throughout the course of the colon.  No acute mesenteric or pericolonic inflammatory changes are identified.  There is no free intra-abdominal air or fluid.  No drainable collections are present.    No abnormally enlarged or necrotic appearing lymph nodes are visualized through the chest or abdomen.    No acute musculoskeletal abnormality is evident.  There is similar relatively advanced degenerative alterations of the left hip.  No evidence of acute osseous displacement or destructive bony  lesion is appreciated.  There is an area of irregular hypodensity at the anterior left axillary region with subjacent surgical clips, which measures approximately 2.6 cm x 6.1 cm in average internal attenuation consistent with fluid (3 HU).  Focus of gas just deep to the skin surface is also present (series 2, image 10).  The remaining thoracic and body wall superficial soft tissues are unremarkable.    IMPRESSION  1. Findings consistent with relatively simple fluid collection at the anterior left axillary region, could represent seroma, degraded hematoma; infectious etiology is less favored given absence of enhancing component or significant adjacent inflammatory fat stranding.  2. Fluid-filled small bowel loops and throughout the course of the colon suggestive of acute enterocolitis.  3. Chronic secondary details discussed above.  ==========    This report was flagged in Epic as abnormal.    RADIATION DOSE  Automated tube current modulation, weight-based exposure dosing, and/or iterative reconstruction technique utilized to reach lowest reasonably achievable exposure rate.    DLP: 213 mGy*cm      Electronically signed by: Ray Zheng  Date:    08/31/2023  Time:    17:43                                     X-Ray Chest 1 View (Final result)  Result time 08/31/23 16:00:20      Final result by Sintia Gallagher MD (08/31/23 16:00:20)                   Impression:      No acute abnormality of the chest.      Electronically signed by: Sintia Gallagher  Date:    08/31/2023  Time:    16:00               Narrative:    EXAMINATION:  XR CHEST 1 VIEW    CLINICAL HISTORY:  Fever, unspecified    TECHNIQUE:  AP chest    COMPARISON:  Chest x-ray dated 08/14/2023    FINDINGS:  The heart is normal in size.  The lungs are clear.  There is no pleural effusion or visible pneumothorax.                                       Medications   ciprofloxacin (CIPRO)400mg/200ml D5W IVPB 400 mg (has no administration in time range)    metronidazole IVPB 500 mg (has no administration in time range)   ondansetron injection 4 mg (4 mg Intravenous Given 8/31/23 1431)   acetaminophen tablet 1,000 mg (1,000 mg Oral Given 8/31/23 1415)   lactated ringers bolus 1,000 mL (0 mLs Intravenous Stopped 8/31/23 1515)   HYDROmorphone (PF) injection 1 mg (1 mg Intravenous Given 8/31/23 1659)   ondansetron injection 4 mg (4 mg Intravenous Given 8/31/23 1700)   piperacillin-tazobactam (ZOSYN) 4.5 g in dextrose 5 % in water (D5W) 100 mL IVPB (MB+) (0 g Intravenous Stopped 8/31/23 1730)   lactated ringers bolus 1,000 mL (1,000 mLs Intravenous New Bag 8/31/23 1700)   lactated ringers bolus 1,000 mL (1,000 mLs Intravenous New Bag 8/31/23 1730)   iopamidoL (ISOVUE-370) injection 100 mL (100 mLs Intravenous Given 8/31/23 1729)   HYDROmorphone (PF) injection 1 mg (1 mg Intravenous Given 8/31/23 2014)     Medical Decision Making  Historian:  Patient.  Patient is a 47-year-old female  that presents with vomiting, body aches, chills, fever, upper abdominal pain that has been present last night. Associated symptoms pain near left axilla biopsy site. Surrounding information is nothing. Exacerbated by nothing. Relieved by nothing. Patient treatment prior to arrival none. Risk factors include none. Other history pertaining to this complaint left axilla biopsy 08/18/2023 by Dr. Styles.   Assessment:  See physical exam.  DD:  Chest abscess, empyema, MI, acute coronary syndrome, pancreatitis, choledocholithiasis, gastritis, viral illness, COVID, flu, colitis, gastroenteritis, diverticulitis    Amount and/or Complexity of Data Reviewed  Labs: ordered.     Details: Elevated white blood cell count.  Radiology: ordered.     Details: Patient has a fluid collection in the left axilla.  This is thought to be a seroma.  She also has enterocolitis.  Discussion of management or test interpretation with external provider(s): History was obtained.  Physical was performed.  I did speak to  Dr. Fairbanks and he does not feel this is an infectious process to the left axilla.  I then spoke to Dr. Yusuf and he would like the patient admitted.  Patient can be started on Cipro Flagyl.  He also wants a GI panel.  No social determinants that affect healthcare were noted.  This patient was extensively discussed with Dr. Evans the emergency room physician.  She agrees with this admission plan did perform a face-to-face interaction with patient.    Risk  OTC drugs.  Prescription drug management.  Decision regarding hospitalization.              Attending Attestation:     Physician Attestation Statement for NP/PA:   I have directed and reviewed the workup performed by the PA/NP.  I performed the substantive portion of the medical decision making.     Other NP/PA Attestation Additions:    History of Present Illness: See ed course for attestation               ED Course as of 08/31/23 2120   Thu Aug 31, 2023   1642 Assumed care and initiated sepsis antibiotics [CL]   1737 Dr. Evans supervisory attestation  I performed substantive portion of MDM. I performed a history, physical exam, reviewed pertinent available previous records and discussed plan of care with NP I had face to face time evaluation of the patient    HPI:  47-year-old female with a history of Hodgkin's lymphoma presents ED for evaluation of nausea, abdominal pain and fever all starting yesterday.  Pain is epigastric and constant starting yesterday fever was up to 102.  She denies any URI symptoms.  She had a lymph node biopsy 6 days ago and the incision has healed quite well.  She was not yet receiving chemotherapy  PE:  Normocephalic, atraumatic  Regular rate, lungs clear to auscultation bilaterally  Left axilla incision appears well healed but is mildly tender to palpation  Well healed midline abdominal incision with some mild epigastric tenderness without rebound or guarding  MDM:  She arrived febrile and tachycardic with a leukocytosis.   This may be due to an acute infection and sepsis however can also be seen with lymphoma.  CTs have been ordered and she was given IV antibiotics   [BS]   1824 Paged Dr Fairbanks [CL]   1910 Dr Fairbanks will see patient on outpatient basis. He does not feel that the fluid collection in axilla is infectious.  [CL]   2046 Paged Dr Yusuf  [CL]      ED Course User Index  [BS] Sarai Evans MD  [CL] Addison Hernandez FNP                      Clinical Impression:   Final diagnoses:  [R50.9] Fever  [R10.10] Upper abdominal pain  [K52.9] Colitis (Primary)        ED Disposition Condition    Admit Stable                Addison Hernandez FNP  08/31/23 6830       Sarai Evans MD  09/04/23 4822

## 2023-09-01 LAB
ABS NEUT (OLG): 4.81 X10(3)/MCL (ref 2.1–9.2)
ADV 40+41 DNA STL QL NAA+NON-PROBE: NOT DETECTED
ANION GAP SERPL CALC-SCNC: 10 MEQ/L
ASTRO TYP 1-8 RNA STL QL NAA+NON-PROBE: NOT DETECTED
BUN SERPL-MCNC: 10.4 MG/DL (ref 7–18.7)
C CAYETANENSIS DNA STL QL NAA+NON-PROBE: NOT DETECTED
C COLI+JEJ+UPSA DNA STL QL NAA+NON-PROBE: NOT DETECTED
CALCIUM SERPL-MCNC: 8.5 MG/DL (ref 8.4–10.2)
CHLORIDE SERPL-SCNC: 107 MMOL/L (ref 98–107)
CO2 SERPL-SCNC: 22 MMOL/L (ref 22–29)
CREAT SERPL-MCNC: 0.75 MG/DL (ref 0.55–1.02)
CREAT/UREA NIT SERPL: 14
CRYPTOSP DNA STL QL NAA+NON-PROBE: NOT DETECTED
E HISTOLYT DNA STL QL NAA+NON-PROBE: NOT DETECTED
EAEC PAA PLAS AGGR+AATA ST NAA+NON-PRB: NOT DETECTED
EC STX1+STX2 GENES STL QL NAA+NON-PROBE: NOT DETECTED
EOSINOPHIL NFR BLD MANUAL: 0.36 X10(3)/MCL (ref 0–0.9)
EOSINOPHIL NFR BLD MANUAL: 2 %
EPEC EAE GENE STL QL NAA+NON-PROBE: NOT DETECTED
ERYTHROCYTE [DISTWIDTH] IN BLOOD BY AUTOMATED COUNT: 14.5 % (ref 11.5–17)
ETEC LTA+ST1A+ST1B TOX ST NAA+NON-PROBE: NOT DETECTED
G LAMBLIA DNA STL QL NAA+NON-PROBE: NOT DETECTED
GFR SERPLBLD CREATININE-BSD FMLA CKD-EPI: >60 MLS/MIN/1.73/M2
GLUCOSE SERPL-MCNC: 138 MG/DL (ref 74–100)
HCT VFR BLD AUTO: 38.4 % (ref 37–47)
HGB BLD-MCNC: 12.2 G/DL (ref 12–16)
INSTRUMENT WBC (OLG): 17.83 X10(3)/MCL
LYMPHOCYTES NFR BLD MANUAL: 12.48 X10(3)/MCL
LYMPHOCYTES NFR BLD MANUAL: 70 %
MAGNESIUM SERPL-MCNC: 1.6 MG/DL (ref 1.6–2.6)
MCH RBC QN AUTO: 27.3 PG (ref 27–31)
MCHC RBC AUTO-ENTMCNC: 31.8 G/DL (ref 33–36)
MCV RBC AUTO: 85.9 FL (ref 80–94)
MONOCYTES NFR BLD MANUAL: 0.36 X10(3)/MCL (ref 0.1–1.3)
MONOCYTES NFR BLD MANUAL: 2 %
NEUTROPHILS NFR BLD MANUAL: 27 %
NOROVIRUS GI+II RNA STL QL NAA+NON-PROBE: DETECTED
NRBC BLD AUTO-RTO: 0 %
OVALOCYTES (OLG): ABNORMAL
P SHIGELLOIDES DNA STL QL NAA+NON-PROBE: NOT DETECTED
PHOSPHATE SERPL-MCNC: 3.9 MG/DL (ref 2.3–4.7)
PLATELET # BLD AUTO: 233 X10(3)/MCL (ref 130–400)
PLATELET # BLD EST: NORMAL 10*3/UL
PMV BLD AUTO: 10.8 FL (ref 7.4–10.4)
POCT GLUCOSE: 113 MG/DL (ref 70–110)
POCT GLUCOSE: 126 MG/DL (ref 70–110)
POCT GLUCOSE: 135 MG/DL (ref 70–110)
POIKILOCYTOSIS BLD QL SMEAR: ABNORMAL
POTASSIUM SERPL-SCNC: 3.3 MMOL/L (ref 3.5–5.1)
RBC # BLD AUTO: 4.47 X10(6)/MCL (ref 4.2–5.4)
RBC MORPH BLD: ABNORMAL
RVA RNA STL QL NAA+NON-PROBE: NOT DETECTED
S ENT+BONG DNA STL QL NAA+NON-PROBE: NOT DETECTED
SAPO I+II+IV+V RNA STL QL NAA+NON-PROBE: NOT DETECTED
SHIGELLA SP+EIEC IPAH ST NAA+NON-PROBE: NOT DETECTED
SODIUM SERPL-SCNC: 139 MMOL/L (ref 136–145)
V CHOL+PARA+VUL DNA STL QL NAA+NON-PROBE: NOT DETECTED
V CHOLERAE DNA STL QL NAA+NON-PROBE: NOT DETECTED
WBC # SPEC AUTO: 17.83 X10(3)/MCL (ref 4.5–11.5)
Y ENTEROCOL DNA STL QL NAA+NON-PROBE: NOT DETECTED

## 2023-09-01 PROCEDURE — 25000003 PHARM REV CODE 250: Performed by: NURSE PRACTITIONER

## 2023-09-01 PROCEDURE — 80048 BASIC METABOLIC PNL TOTAL CA: CPT | Performed by: NURSE PRACTITIONER

## 2023-09-01 PROCEDURE — 25000003 PHARM REV CODE 250: Performed by: INTERNAL MEDICINE

## 2023-09-01 PROCEDURE — 11000001 HC ACUTE MED/SURG PRIVATE ROOM

## 2023-09-01 PROCEDURE — 63600175 PHARM REV CODE 636 W HCPCS: Performed by: NURSE PRACTITIONER

## 2023-09-01 PROCEDURE — 87045 FECES CULTURE AEROBIC BACT: CPT | Performed by: INTERNAL MEDICINE

## 2023-09-01 PROCEDURE — 84100 ASSAY OF PHOSPHORUS: CPT | Performed by: INTERNAL MEDICINE

## 2023-09-01 PROCEDURE — 87077 CULTURE AEROBIC IDENTIFY: CPT | Performed by: INTERNAL MEDICINE

## 2023-09-01 PROCEDURE — 85027 COMPLETE CBC AUTOMATED: CPT | Performed by: NURSE PRACTITIONER

## 2023-09-01 PROCEDURE — 85025 COMPLETE CBC W/AUTO DIFF WBC: CPT | Performed by: NURSE PRACTITIONER

## 2023-09-01 PROCEDURE — 87507 IADNA-DNA/RNA PROBE TQ 12-25: CPT | Performed by: NURSE PRACTITIONER

## 2023-09-01 PROCEDURE — 83735 ASSAY OF MAGNESIUM: CPT | Performed by: INTERNAL MEDICINE

## 2023-09-01 RX ORDER — HYOSCYAMINE SULFATE 0.12 MG/1
0.12 TABLET SUBLINGUAL EVERY 4 HOURS PRN
Status: DISCONTINUED | OUTPATIENT
Start: 2023-09-01 | End: 2023-09-05 | Stop reason: HOSPADM

## 2023-09-01 RX ORDER — BUTALBITAL, ACETAMINOPHEN AND CAFFEINE 50; 325; 40 MG/1; MG/1; MG/1
1 TABLET ORAL EVERY 4 HOURS PRN
Status: DISCONTINUED | OUTPATIENT
Start: 2023-09-01 | End: 2023-09-05 | Stop reason: HOSPADM

## 2023-09-01 RX ADMIN — ACETAMINOPHEN 650 MG: 325 TABLET, FILM COATED ORAL at 03:09

## 2023-09-01 RX ADMIN — HYOSCYAMINE SULFATE 0.12 MG: 0.12 TABLET ORAL at 09:09

## 2023-09-01 RX ADMIN — CIPROFLOXACIN 400 MG: 2 INJECTION, SOLUTION INTRAVENOUS at 09:09

## 2023-09-01 RX ADMIN — METRONIDAZOLE 500 MG: 5 INJECTION, SOLUTION INTRAVENOUS at 02:09

## 2023-09-01 RX ADMIN — METRONIDAZOLE 500 MG: 5 INJECTION, SOLUTION INTRAVENOUS at 10:09

## 2023-09-01 RX ADMIN — HYDROMORPHONE HYDROCHLORIDE 1 MG: 2 INJECTION, SOLUTION INTRAMUSCULAR; INTRAVENOUS; SUBCUTANEOUS at 04:09

## 2023-09-01 RX ADMIN — ONDANSETRON 4 MG: 2 INJECTION INTRAMUSCULAR; INTRAVENOUS at 09:09

## 2023-09-01 RX ADMIN — METRONIDAZOLE 500 MG: 5 INJECTION, SOLUTION INTRAVENOUS at 05:09

## 2023-09-01 RX ADMIN — HYDROMORPHONE HYDROCHLORIDE 1 MG: 2 INJECTION, SOLUTION INTRAMUSCULAR; INTRAVENOUS; SUBCUTANEOUS at 03:09

## 2023-09-01 RX ADMIN — SODIUM CHLORIDE: 9 INJECTION, SOLUTION INTRAVENOUS at 09:09

## 2023-09-01 RX ADMIN — CIPROFLOXACIN 400 MG: 2 INJECTION, SOLUTION INTRAVENOUS at 10:09

## 2023-09-01 RX ADMIN — ACETAMINOPHEN 650 MG: 325 TABLET, FILM COATED ORAL at 10:09

## 2023-09-01 RX ADMIN — METRONIDAZOLE 500 MG: 5 INJECTION, SOLUTION INTRAVENOUS at 12:09

## 2023-09-01 NOTE — PROGRESS NOTES
PATIENT NAME:       TOYA PERRY  YOB: 1975  CSN:                [id number]   MRN:                [id number]  ADMIT DATE:         08/31/2023 13:40:00  PHYSICIAN:          Raj Yusuf MD                            PROGRESS NOTE    DATE:  09/01/2023 00:00:00    SUBJECTIVE:  This is a 47-year-old  female admitted yesterday   after having significant nausea, vomiting, and diarrhea with abdominal cramps.    She is still nauseous.  She has not had any vomiting as of yesterday and she was   found to have norovirus in the GI panel done.  She feels fatigued and weak.  No   other significant issues.    REVIEW OF SYSTEMS:  X12 as above.    LABORATORY DATA:  White cell count 17.8.  Her potassium was 3.3, glucose 138.    Hemoglobin A1c 5.5.    IMPRESSION:    1. Acute enterocolitis secondary to norovirus with dehydration and hypokalemia.  2. She has history of low-grade B-cell lymphoma.  3. Hypertension.  4. Dyslipidemia.  5. Diabetes mellitus type 2.  6. Slight leukocytosis, probably reactive.  7. She has history of GERD.  8. PUD.    9. History of vitamin D, folate, and B12 deficiency.  10. Significant obesity.    PLAN:  We will continue with antiemetics.  We will continue with liquids.  We   will continue with clonidine patch for the time being.  We will place on CVDs   and sliding scale, clear liquids as tolerated.  We will replace potassium.        ______________________________  Raj Yusuf MD    JOSE/AQS  DD:  09/01/2023  Time:  02:27PM  DT:  09/01/2023  Time:  03:30PM  Job #:  [id number]      PROGRESS NOTE

## 2023-09-01 NOTE — PLAN OF CARE
Problem: Adult Inpatient Plan of Care  Goal: Plan of Care Review  Outcome: Ongoing, Progressing  Flowsheets (Taken 9/1/2023 0322)  Plan of Care Reviewed With:   patient   spouse  Goal: Patient-Specific Goal (Individualized)  Outcome: Ongoing, Progressing  Goal: Absence of Hospital-Acquired Illness or Injury  Outcome: Ongoing, Progressing  Intervention: Identify and Manage Fall Risk  Flowsheets (Taken 9/1/2023 0322)  Safety Promotion/Fall Prevention:   assistive device/personal item within reach   nonskid shoes/socks when out of bed   side rails raised x 3  Intervention: Prevent Skin Injury  Flowsheets (Taken 9/1/2023 0322)  Body Position:   position changed independently   weight shifting  Skin Protection:   adhesive use limited   tubing/devices free from skin contact  Intervention: Prevent and Manage VTE (Venous Thromboembolism) Risk  Flowsheets (Taken 9/1/2023 0322)  Activity Management: Ambulated to bathroom - L4  VTE Prevention/Management: ambulation promoted  Range of Motion: active ROM (range of motion) encouraged  Intervention: Prevent Infection  Flowsheets (Taken 9/1/2023 0322)  Infection Prevention:   hand hygiene promoted   single patient room provided   rest/sleep promoted  Goal: Optimal Comfort and Wellbeing  Outcome: Ongoing, Progressing  Intervention: Monitor Pain and Promote Comfort  Flowsheets (Taken 9/1/2023 0322)  Pain Management Interventions:   medication offered   care clustered   quiet environment facilitated  Intervention: Provide Person-Centered Care  Flowsheets (Taken 9/1/2023 0322)  Trust Relationship/Rapport:   care explained   questions encouraged   choices provided   emotional support provided   reassurance provided   thoughts/feelings acknowledged   empathic listening provided   questions answered

## 2023-09-01 NOTE — PLAN OF CARE
09/01/23 0912   Discharge Assessment   Assessment Type Discharge Planning Assessment   Confirmed/corrected address, phone number and insurance Yes   Confirmed Demographics Correct on Facesheet   Source of Information patient   When was your last doctors appointment?   (Patient reports this morning.)   Communicated KUMAR with patient/caregiver Yes   Reason For Admission Colitis   People in Home spouse   Do you expect to return to your current living situation? Yes   Do you have help at home or someone to help you manage your care at home? Yes   Who are your caregiver(s) and their phone number(s)? Spouse: Talib Lazar: [phone]   Prior to hospitilization cognitive status: Unable to Assess   Current cognitive status: Alert/Oriented   Walking or Climbing Stairs ambulation difficulty, requires equipment   Home Accessibility stairs to enter home   Number of Stairs, Main Entrance three   Home Layout Able to live on 1st floor   Equipment Currently Used at Home cane, straight;glucometer;walker, rolling  (Patient reports she needs to fix her CPAP.)   Readmission within 30 days? No   Patient currently being followed by outpatient case management? No   Do you currently have service(s) that help you manage your care at home? No   Do you take prescription medications? Yes   Do you have prescription coverage? Yes   Coverage [facility] All out of State   Do you have any problems affording any of your prescribed medications? No   Is the patient taking medications as prescribed? yes   Who is going to help you get home at discharge? Patient reports her spouse.   How do you get to doctors appointments? car, drives self   Are you on dialysis? No   Do you take coumadin? No   DME Needed Upon Discharge  none   Discharge Plan discussed with: Patient   Transition of Care Barriers None   Discharge Plan A Home with family   Discharge Plan B Home   OTHER   Name(s) of People in Home Pateint resides with her  at home.        Patient's PCP is Dr. Raj Yusuf.  No barriers to discharge at this time.

## 2023-09-01 NOTE — PLAN OF CARE
Problem: Adult Inpatient Plan of Care  Goal: Plan of Care Review  Outcome: Ongoing, Progressing  Goal: Patient-Specific Goal (Individualized)  Outcome: Ongoing, Progressing  Goal: Absence of Hospital-Acquired Illness or Injury  Outcome: Ongoing, Progressing  Intervention: Identify and Manage Fall Risk  Flowsheets (Taken 9/1/2023 0800)  Safety Promotion/Fall Prevention:   assistive device/personal item within reach   side rails raised x 2   nonskid shoes/socks when out of bed   medications reviewed  Intervention: Prevent Skin Injury  Flowsheets (Taken 9/1/2023 0800)  Body Position: position changed independently  Skin Protection:   adhesive use limited   tubing/devices free from skin contact  Intervention: Prevent and Manage VTE (Venous Thromboembolism) Risk  Flowsheets (Taken 9/1/2023 0800)  Activity Management: Ambulated to bathroom - L4  VTE Prevention/Management:   ambulation promoted   intravenous hydration  Range of Motion: active ROM (range of motion) encouraged  Intervention: Prevent Infection  Flowsheets (Taken 9/1/2023 0800)  Infection Prevention:   cohorting utilized   hand hygiene promoted   single patient room provided   rest/sleep promoted  Goal: Optimal Comfort and Wellbeing  Outcome: Ongoing, Progressing  Intervention: Monitor Pain and Promote Comfort  Flowsheets (Taken 9/1/2023 0800)  Pain Management Interventions:   care clustered   pain management plan reviewed with patient/caregiver   quiet environment facilitated  Intervention: Provide Person-Centered Care  Flowsheets (Taken 9/1/2023 0800)  Trust Relationship/Rapport:   care explained   questions answered   thoughts/feelings acknowledged   questions encouraged  Goal: Readiness for Transition of Care  Outcome: Ongoing, Progressing

## 2023-09-01 NOTE — PROGRESS NOTES
"[facility] - Oncology Acute  Wound Care    Patient Name:  Cristina Lazar   MRN:  [id number]  Date: 9/1/2023  Diagnosis: <principal problem not specified>    History:     Past Medical History:   Diagnosis Date    Arthritis     CLL (chronic lymphocytic leukemia)     Diabetes mellitus     Hypertension     Migraines     Sleep apnea     cpap    Thyroid disease     states recently instructed she could stop thyroid med.       Social History     Socioeconomic History    Marital status:    Tobacco Use    Smoking status: Never    Smokeless tobacco: Never   Substance and Sexual Activity    Alcohol use: Yes     Comment: wine socially    Drug use: Never    Sexual activity: Yes       Precautions:     Allergies as of 08/31/2023    (No Known Allergies)       WOC Assessment Details/Treatment     WOC consult for lower back wound that patient identified as a "boil" and left axilla incisional site. Assessed and cleansed boil. Placing orders to cleanse wound with vashe every two days and cover with a foam. Cleanse left axilla incisional site with vashe and cover with a gauze and medipore tape. Patient educated on keeping the affected areas clean and dry.      09/01/23 0830        Incision/Site 08/18/23 1034 Left Axilla   Date First Assessed/Time First Assessed: 08/18/23 1034   Side: Left  Location: Axilla   Wound Image    Dressing Appearance Intact;Clean;Dry   Drainage Amount None   Appearance Intact;Dermabond   Wound Edges Approximated   Wound Length (cm) 9 cm   Wound Width (cm) 4 cm   Wound Surface Area (cm^2) 36 cm^2   Care Cleansed with:;Antimicrobial agent   Dressing Applied;Gauze;Other (comment)  (medipore tape)        Altered Skin Integrity 09/01/23 0230 lower Thoracic spine Blister(s) Partial thickness tissue loss. Shallow open ulcer with a red or pink wound bed, without slough. Intact or Open/Ruptured Serum-filled blister.   Date First Assessed/Time First Assessed: 09/01/23 0230   Altered Skin Integrity " "Present on Admission - Did Patient arrive to the hospital with altered skin?: yes  Orientation: lower  Location: Thoracic spine  Is this injury device related?: Other (see...   Wound Image    Dressing Appearance Open to air   Drainage Amount None   Appearance Other (see comments)  (hyperpigmented area that the patient described as a "past boil that drained", nontender)   Wound Edges Defined   Wound Length (cm) 0.5 cm   Wound Width (cm) 1 cm   Wound Surface Area (cm^2) 0.5 cm^2   Care Cleansed with:;Antimicrobial agent          Recommendations made to primary team for the affected area of the lower back and left axilla. Orders placed.     09/01/2023    "

## 2023-09-01 NOTE — H&P
[facility]                       [address] [id number]    PATIENT NAME:       TOYA PERRY  YOB: 1975  CSN:                [id number]   MRN:                [id number]  ADMIT DATE:         08/31/2023 13:40:00  PHYSICIAN:          Raj Yusuf MD                        HISTORY AND PHYSICAL      HISTORY OF PRESENT ILLNESS:  This is a 47-year-old  female, well   known to me for several years.  She is said to present with nausea and vomiting   2 days prior to her evaluation in the emergency room.  She stated that she had   several episodes of vomiting as well as loose stools and she had a fever that it   went up to 102 or it will be higher than that.  She states having some cramps   before having the loose bowels.  She denies having any blood in the stools.  She   denies any shortness of breath, chest pain, palpitations, headaches, or other   new problems.  She was admitted to the hospital for further evaluation and   treatment the illness.  Workup at the emergency room reveal a slight   leukocytosis with a white blood cell 15.5.  Her BUN was 10.4, creatinine 0.83.    Troponin is negative.  Lactate 1.5.  She was negative for flu and SARS.  Her   urine was unremarkable.  She had a CT scan of the abdomen that shows some   dilatation of the main pulmonary artery.  No filling defects.  She has some   atherosclerotic calcifications.  She has the gallbladder absent surgically.    Slight prominence of the ducts that is normal for status post cholecystectomy.    She get postop changes of the stomach consistent with gastric bypass, prominent   fluid-filled loops of the small bowel throughout the abdomen with abnormal   air-fluid levels or transition point that is suggestive of enterocolitis.  There   is an area of irregular hypodensity in the left axillary region that is   suggestive of a seroma  after she did have noted excision in the area 2.6 x 6.1   cm with a little focus of gas.  These areas not enhance being less likely   infectious.  She had a small and large bowel fluid fill suggestive of   enterocolitis.    PAST MEDICAL HISTORY:  Remarkable for hypertension.  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  She has history of GERD, peripheral neuropathy, B12 deficiency,   folate deficiency.  Surgical history includes remote history of Elissa-en-Y   gastric bypass in 2010.  She has hypersomnia.  She has iron deficiency, history   of migraines, history of asthma, osteoarthritis, depression, anxiety,   fibromyalgia, lumbar disc disease, peripheral neuropathy, diabetic, panic   attacks.  She has history of fatty liver, vitamin D deficiency, PUD.    FAMILY HISTORY:  Includes blood clots in mother and father, hypertension in   mother and father.  Father had heart disease.  Mother has diabetes as well.    PAST SURGICAL HISTORY:  Include node biopsy left in 08/2023, carpal tunnel on   the right.  She had a cholecystectomy, hysterectomy, knee ligament   reconstruction in the right, Elissa-en-Y gastric bypass in 2010, tonsillectomy,   right foot surgery, and tubal ligation.    SOCIAL HISTORY:  She is .  She never smoked.  She drinks socially.    Denies any illegal drugs.  She used to work as a .    PHYSICAL EXAMINATION:  VITAL SIGNS:  Blood pressure was 139/89, pulse 104, and temp 101.5.  GENERAL APPEARANCE:  She is alert, slightly tachycardic.  HEENT:  Normocephalic, atraumatic.  PERRLA.  EOMI.    NECK:  Supple.   There is no JVD, no bruits.  HEART:  Slightly tachycardic.  S1, S2 audible.  LUNGS:  Clear.  ABDOMEN:  Slightly tender in all areas.  There is no rebound.  Bowel sounds are   slightly hyperactive.   GENITORECTAL:  No discharge, normal for age.    EXTREMITIES:  No clubbing, cyanosis.  Trace edema.  She has a left axillary site   that has no erythema, swelling, or drainage.   NEUROLOGIC:  Nonfocal.  Cranial nerves 2-12 are intact.    IMPRESSION:    1. Acute enterocolitis of unknown origin in a patient immunosuppressed with   history of low-grade B-cell lymphoma.  2. She has history of hypertension.  3. Dyslipidemia.  4. Diabetes mellitus type 2.  5. Peripheral neuropathy.  6. GERD with PUD in the past.  7. Vitamin D deficiency.  8. Folate deficiency.  9. B12 deficiency.  10. Chronic back pain.  11. She has history of hypothyroidism.    PLAN:  The patient admitted to the hospital.  She has a gastric PCR order.  We   will start clear liquids as tolerated.  We will avoid milk, fiber, nothing   fried, and no caffeine.  She is going to be on IV fluids.  We will place her on   DVT prophylaxis.        ______________________________  MD JOSE Sanchez/ROSALIE  DD:  09/01/2023  Time:  02:27PM  DT:  09/01/2023  Time:  03:25PM  Job #:  [id number]      HISTORY AND PHYSICAL

## 2023-09-01 NOTE — NURSING
Nurses Note -- 4 Eyes      9/1/2023   5:33 AM      Skin assessed during: Admit      [] No Altered Skin Integrity Present    []Prevention Measures Documented      [x] Yes- Altered Skin Integrity Present or Discovered   [] LDA Added if Not in Epic (Describe Wound)   [] New Altered Skin Integrity was Present on Admit and Documented in LDA   [x] Wound Image Taken    Wound Care Consulted? Yes    Attending Nurse:  Nellie Arreaga RN/Staff Member:   LUANN Gómez RN

## 2023-09-02 LAB
ALBUMIN SERPL-MCNC: 3.2 G/DL (ref 3.5–5)
ALBUMIN/GLOB SERPL: 1 RATIO (ref 1.1–2)
ALP SERPL-CCNC: 68 UNIT/L (ref 40–150)
ALT SERPL-CCNC: 22 UNIT/L (ref 0–55)
AST SERPL-CCNC: 16 UNIT/L (ref 5–34)
BASOPHILS # BLD AUTO: 0.08 X10(3)/MCL
BASOPHILS NFR BLD AUTO: 0.4 %
BILIRUB SERPL-MCNC: 0.4 MG/DL
BUN SERPL-MCNC: 4.9 MG/DL (ref 7–18.7)
CALCIUM SERPL-MCNC: 8.8 MG/DL (ref 8.4–10.2)
CHLORIDE SERPL-SCNC: 111 MMOL/L (ref 98–107)
CO2 SERPL-SCNC: 24 MMOL/L (ref 22–29)
CREAT SERPL-MCNC: 0.68 MG/DL (ref 0.55–1.02)
EOSINOPHIL # BLD AUTO: 0.39 X10(3)/MCL (ref 0–0.9)
EOSINOPHIL NFR BLD AUTO: 2 %
ERYTHROCYTE [DISTWIDTH] IN BLOOD BY AUTOMATED COUNT: 14.6 % (ref 11.5–17)
GFR SERPLBLD CREATININE-BSD FMLA CKD-EPI: >60 MLS/MIN/1.73/M2
GLOBULIN SER-MCNC: 3.3 GM/DL (ref 2.4–3.5)
GLUCOSE SERPL-MCNC: 107 MG/DL (ref 70–110)
GLUCOSE SERPL-MCNC: 91 MG/DL (ref 74–100)
HCT VFR BLD AUTO: 37.9 % (ref 37–47)
HGB BLD-MCNC: 12.2 G/DL (ref 12–16)
IMM GRANULOCYTES # BLD AUTO: 0.03 X10(3)/MCL (ref 0–0.04)
IMM GRANULOCYTES NFR BLD AUTO: 0.2 %
LYMPHOCYTES # BLD AUTO: 13.85 X10(3)/MCL (ref 0.6–4.6)
LYMPHOCYTES NFR BLD AUTO: 72.7 %
MCH RBC QN AUTO: 27.9 PG (ref 27–31)
MCHC RBC AUTO-ENTMCNC: 32.2 G/DL (ref 33–36)
MCV RBC AUTO: 86.7 FL (ref 80–94)
MONOCYTES # BLD AUTO: 0.98 X10(3)/MCL (ref 0.1–1.3)
MONOCYTES NFR BLD AUTO: 5.1 %
NEUTROPHILS # BLD AUTO: 3.73 X10(3)/MCL (ref 2.1–9.2)
NEUTROPHILS NFR BLD AUTO: 19.6 %
NRBC BLD AUTO-RTO: 0 %
PLATELET # BLD AUTO: 201 X10(3)/MCL (ref 130–400)
PMV BLD AUTO: 11.1 FL (ref 7.4–10.4)
POCT GLUCOSE: 103 MG/DL (ref 70–110)
POCT GLUCOSE: 107 MG/DL (ref 70–110)
POCT GLUCOSE: 113 MG/DL (ref 70–110)
POCT GLUCOSE: 85 MG/DL (ref 70–110)
POCT GLUCOSE: 96 MG/DL (ref 70–110)
POCT GLUCOSE: 98 MG/DL (ref 70–110)
POTASSIUM SERPL-SCNC: 3.6 MMOL/L (ref 3.5–5.1)
PROT SERPL-MCNC: 6.5 GM/DL (ref 6.4–8.3)
RBC # BLD AUTO: 4.37 X10(6)/MCL (ref 4.2–5.4)
SODIUM SERPL-SCNC: 142 MMOL/L (ref 136–145)
WBC # SPEC AUTO: 19.06 X10(3)/MCL (ref 4.5–11.5)

## 2023-09-02 PROCEDURE — 25000003 PHARM REV CODE 250: Performed by: INTERNAL MEDICINE

## 2023-09-02 PROCEDURE — 63600175 PHARM REV CODE 636 W HCPCS: Performed by: NURSE PRACTITIONER

## 2023-09-02 PROCEDURE — 11000001 HC ACUTE MED/SURG PRIVATE ROOM

## 2023-09-02 PROCEDURE — 85025 COMPLETE CBC W/AUTO DIFF WBC: CPT | Performed by: INTERNAL MEDICINE

## 2023-09-02 PROCEDURE — 80053 COMPREHEN METABOLIC PANEL: CPT | Performed by: INTERNAL MEDICINE

## 2023-09-02 RX ADMIN — METRONIDAZOLE 500 MG: 5 INJECTION, SOLUTION INTRAVENOUS at 05:09

## 2023-09-02 RX ADMIN — SODIUM CHLORIDE: 9 INJECTION, SOLUTION INTRAVENOUS at 09:09

## 2023-09-02 RX ADMIN — CIPROFLOXACIN 400 MG: 2 INJECTION, SOLUTION INTRAVENOUS at 09:09

## 2023-09-02 RX ADMIN — SODIUM CHLORIDE: 9 INJECTION, SOLUTION INTRAVENOUS at 07:09

## 2023-09-02 RX ADMIN — METRONIDAZOLE 500 MG: 5 INJECTION, SOLUTION INTRAVENOUS at 01:09

## 2023-09-02 RX ADMIN — ONDANSETRON 4 MG: 2 INJECTION INTRAMUSCULAR; INTRAVENOUS at 02:09

## 2023-09-02 RX ADMIN — BUTALBITAL, ACETAMINOPHEN, AND CAFFEINE 1 TABLET: 325; 50; 40 TABLET ORAL at 09:09

## 2023-09-02 RX ADMIN — ONDANSETRON 4 MG: 2 INJECTION INTRAMUSCULAR; INTRAVENOUS at 12:09

## 2023-09-02 RX ADMIN — HYDROMORPHONE HYDROCHLORIDE 1 MG: 2 INJECTION, SOLUTION INTRAMUSCULAR; INTRAVENOUS; SUBCUTANEOUS at 12:09

## 2023-09-02 RX ADMIN — HYOSCYAMINE SULFATE 0.12 MG: 0.12 TABLET ORAL at 11:09

## 2023-09-02 RX ADMIN — HYDROMORPHONE HYDROCHLORIDE 1 MG: 2 INJECTION, SOLUTION INTRAMUSCULAR; INTRAVENOUS; SUBCUTANEOUS at 02:09

## 2023-09-02 NOTE — PLAN OF CARE
Problem: Adult Inpatient Plan of Care  Goal: Plan of Care Review  Outcome: Ongoing, Progressing  Goal: Patient-Specific Goal (Individualized)  Outcome: Ongoing, Progressing  Goal: Absence of Hospital-Acquired Illness or Injury  Outcome: Ongoing, Progressing  Intervention: Identify and Manage Fall Risk  Flowsheets (Taken 9/2/2023 0810)  Safety Promotion/Fall Prevention:   assistive device/personal item within reach   side rails raised x 2   nonskid shoes/socks when out of bed   medications reviewed  Intervention: Prevent Skin Injury  Flowsheets (Taken 9/2/2023 0810)  Body Position: position changed independently  Skin Protection:   adhesive use limited   incontinence pads utilized  Intervention: Prevent and Manage VTE (Venous Thromboembolism) Risk  Flowsheets (Taken 9/2/2023 0810)  VTE Prevention/Management:   ambulation promoted   intravenous hydration  Range of Motion: active ROM (range of motion) encouraged  Goal: Optimal Comfort and Wellbeing  Outcome: Ongoing, Progressing  Intervention: Monitor Pain and Promote Comfort  Flowsheets (Taken 9/2/2023 0810)  Pain Management Interventions:   medication offered   quiet environment facilitated   pain management plan reviewed with patient/caregiver   care clustered  Intervention: Provide Person-Centered Care  Flowsheets (Taken 9/2/2023 0810)  Trust Relationship/Rapport:   care explained   questions answered   thoughts/feelings acknowledged  Goal: Readiness for Transition of Care  Outcome: Ongoing, Progressing

## 2023-09-02 NOTE — PLAN OF CARE
Problem: Adult Inpatient Plan of Care  Goal: Plan of Care Review  Outcome: Ongoing, Progressing  Flowsheets (Taken 9/1/2023 2311)  Plan of Care Reviewed With:   patient   family  Goal: Patient-Specific Goal (Individualized)  Outcome: Ongoing, Progressing  Flowsheets (Taken 9/1/2023 2311)  Anxieties, Fears or Concerns: Diagnosis  Individualized Care Needs: manage pain, iv antibiotics  Goal: Absence of Hospital-Acquired Illness or Injury  Outcome: Ongoing, Progressing  Intervention: Identify and Manage Fall Risk  Flowsheets (Taken 9/1/2023 2311)  Safety Promotion/Fall Prevention:   side rails raised x 2   nonskid shoes/socks when out of bed  Intervention: Prevent Skin Injury  Flowsheets (Taken 9/1/2023 2311)  Body Position:   position changed independently   weight shifting  Skin Protection:   adhesive use limited   tubing/devices free from skin contact  Intervention: Prevent and Manage VTE (Venous Thromboembolism) Risk  Flowsheets (Taken 9/1/2023 2311)  Activity Management:   Ambulated -L4   Ambulated to bathroom - L4  VTE Prevention/Management:   ambulation promoted   fluids promoted   ROM (passive) performed   ROM (active) performed   intravenous hydration  Range of Motion:   active ROM (range of motion) encouraged   ROM (range of motion) performed  Intervention: Prevent Infection  Flowsheets (Taken 9/1/2023 2311)  Infection Prevention:   equipment surfaces disinfected   hand hygiene promoted   rest/sleep promoted   single patient room provided  Goal: Optimal Comfort and Wellbeing  Outcome: Ongoing, Progressing  Intervention: Monitor Pain and Promote Comfort  Flowsheets (Taken 9/1/2023 2311)  Pain Management Interventions:   care clustered   pillow support provided   relaxation techniques promoted   quiet environment facilitated  Intervention: Provide Person-Centered Care  Flowsheets (Taken 9/1/2023 2311)  Trust Relationship/Rapport:   care explained   choices provided   emotional support provided   empathic  listening provided   questions answered   questions encouraged  Goal: Readiness for Transition of Care  Outcome: Ongoing, Progressing     Problem: Bariatric Environmental Safety  Goal: Safety Maintained with Care  Outcome: Ongoing, Progressing     Problem: Impaired Wound Healing  Goal: Optimal Wound Healing  Outcome: Ongoing, Progressing  Intervention: Promote Wound Healing  Flowsheets (Taken 9/1/2023 2311)  Oral Nutrition Promotion: social interaction promoted  Sleep/Rest Enhancement:   awakenings minimized   consistent schedule promoted   room darkened   relaxation techniques promoted   regular sleep/rest pattern promoted   noise level reduced  Activity Management:   Ambulated -L4   Ambulated to bathroom - L4  Pain Management Interventions:   care clustered   pillow support provided   relaxation techniques promoted   quiet environment facilitated

## 2023-09-02 NOTE — PROGRESS NOTES
SUBJECTIVE:    Today's info : seen and examined, no acute events overnight. Continues to improve   Remains in pain  Afebrile  Heart rate low  Remains on iv abx      REVIEW OF SYSTEMS:  X12 as above.      PHYSICAL EXAMINATION:  VITAL SIGNS:    Vitals:    09/02/23 0423 09/02/23 0424 09/02/23 0724 09/02/23 1110   BP: 127/86  136/85 124/86   Pulse: (!) 58 (!) 58 (!) 57 (!) 57   Resp:  18 18    Temp:  98.1 °F (36.7 °C) 98.4 °F (36.9 °C) 98.3 °F (36.8 °C)   TempSrc:  Oral Oral Oral   SpO2: (!) 93% 95% 96% 95%   Weight:       Height:           GENERAL APPEARANCE:  She is alert, slightly tachycardic.  HEENT:  Normocephalic, atraumatic.  PERRLA.  EOMI.    NECK:  Supple.  There is no JVD, no bruits.  HEART:  Slightly tachycardic.  S1, S2 audible.  LUNGS:  Clear.  ABDOMEN:  Slightly tender in all areas.  There is no rebound.  Bowel sounds are   slightly hyperactive.   GENITORECTAL:  No discharge, normal for age.    EXTREMITIES:  No clubbing, cyanosis.  Trace edema.  She has a left axillary site   that has no erythema, swelling, or drainage.   NEUROLOGIC:  Nonfocal.  Cranial nerves 2-12 are intact.       LABORATORY DATA:    Lab Results   Component Value Date    WBC 17.83 (H) 09/01/2023    WBC 17.83 09/01/2023    HGB 12.2 09/01/2023    HCT 38.4 09/01/2023    MCV 85.9 09/01/2023     09/01/2023         Recent Labs   Lab 09/01/23  0545      K 3.3*   CO2 22   BUN 10.4   CREATININE 0.75   GLUCOSE 138*   CALCIUM 8.5   PHOS 3.9       IMPRESSION:    1. Acute enterocolitis secondary to norovirus with dehydration and hypokalemia.  2. She has history of low-grade B-cell lymphoma.  3. Hypertension.  4. Dyslipidemia.  5. Diabetes mellitus type 2.  6. Slight leukocytosis, probably reactive.  7. She has history of GERD.  8. PUD.    9. History of vitamin D, folate, and B12 deficiency.  10. Significant obesity.    PLAN:  We will continue with antiemetics.  We will continue with liquids.  We   will continue with clonidine patch  for the time being.  We will place on CSCs   and sliding scale, clear liquids as tolerated.   Gi and dvt ppx

## 2023-09-03 LAB
ALBUMIN SERPL-MCNC: 3 G/DL (ref 3.5–5)
ALBUMIN/GLOB SERPL: 1.2 RATIO (ref 1.1–2)
ALP SERPL-CCNC: 64 UNIT/L (ref 40–150)
ALT SERPL-CCNC: 21 UNIT/L (ref 0–55)
AST SERPL-CCNC: 16 UNIT/L (ref 5–34)
BACTERIA STL CULT: NORMAL
BASOPHILS # BLD AUTO: 0.08 X10(3)/MCL
BASOPHILS NFR BLD AUTO: 0.3 %
BILIRUB SERPL-MCNC: 0.3 MG/DL
BUN SERPL-MCNC: 5.5 MG/DL (ref 7–18.7)
CALCIUM SERPL-MCNC: 8.4 MG/DL (ref 8.4–10.2)
CHLORIDE SERPL-SCNC: 113 MMOL/L (ref 98–107)
CO2 SERPL-SCNC: 24 MMOL/L (ref 22–29)
CREAT SERPL-MCNC: 0.71 MG/DL (ref 0.55–1.02)
EOSINOPHIL # BLD AUTO: 0.4 X10(3)/MCL (ref 0–0.9)
EOSINOPHIL NFR BLD AUTO: 1.6 %
ERYTHROCYTE [DISTWIDTH] IN BLOOD BY AUTOMATED COUNT: 14.6 % (ref 11.5–17)
GFR SERPLBLD CREATININE-BSD FMLA CKD-EPI: >60 MLS/MIN/1.73/M2
GLOBULIN SER-MCNC: 2.5 GM/DL (ref 2.4–3.5)
GLUCOSE SERPL-MCNC: 86 MG/DL (ref 74–100)
HCT VFR BLD AUTO: 34.3 % (ref 37–47)
HGB BLD-MCNC: 10.8 G/DL (ref 12–16)
IMM GRANULOCYTES # BLD AUTO: 0.05 X10(3)/MCL (ref 0–0.04)
IMM GRANULOCYTES NFR BLD AUTO: 0.2 %
LYMPHOCYTES # BLD AUTO: 18.4 X10(3)/MCL (ref 0.6–4.6)
LYMPHOCYTES NFR BLD AUTO: 75.3 %
MCH RBC QN AUTO: 27.4 PG (ref 27–31)
MCHC RBC AUTO-ENTMCNC: 31.5 G/DL (ref 33–36)
MCV RBC AUTO: 87.1 FL (ref 80–94)
MONOCYTES # BLD AUTO: 1.06 X10(3)/MCL (ref 0.1–1.3)
MONOCYTES NFR BLD AUTO: 4.3 %
NEUTROPHILS # BLD AUTO: 4.46 X10(3)/MCL (ref 2.1–9.2)
NEUTROPHILS NFR BLD AUTO: 18.3 %
NRBC BLD AUTO-RTO: 0 %
PLATELET # BLD AUTO: 196 X10(3)/MCL (ref 130–400)
PMV BLD AUTO: 10.9 FL (ref 7.4–10.4)
POCT GLUCOSE: 126 MG/DL (ref 70–110)
POCT GLUCOSE: 78 MG/DL (ref 70–110)
POCT GLUCOSE: 78 MG/DL (ref 70–110)
POTASSIUM SERPL-SCNC: 3.8 MMOL/L (ref 3.5–5.1)
PROT SERPL-MCNC: 5.5 GM/DL (ref 6.4–8.3)
RBC # BLD AUTO: 3.94 X10(6)/MCL (ref 4.2–5.4)
SODIUM SERPL-SCNC: 145 MMOL/L (ref 136–145)
WBC # SPEC AUTO: 24.45 X10(3)/MCL (ref 4.5–11.5)

## 2023-09-03 PROCEDURE — 63600175 PHARM REV CODE 636 W HCPCS: Performed by: NURSE PRACTITIONER

## 2023-09-03 PROCEDURE — 11000001 HC ACUTE MED/SURG PRIVATE ROOM

## 2023-09-03 PROCEDURE — 25000003 PHARM REV CODE 250: Performed by: INTERNAL MEDICINE

## 2023-09-03 PROCEDURE — 80053 COMPREHEN METABOLIC PANEL: CPT | Performed by: INTERNAL MEDICINE

## 2023-09-03 PROCEDURE — 84145 PROCALCITONIN (PCT): CPT | Performed by: INTERNAL MEDICINE

## 2023-09-03 PROCEDURE — 85025 COMPLETE CBC W/AUTO DIFF WBC: CPT | Performed by: INTERNAL MEDICINE

## 2023-09-03 RX ORDER — TRAMADOL HYDROCHLORIDE 50 MG/1
50 TABLET ORAL EVERY 4 HOURS PRN
Status: DISCONTINUED | OUTPATIENT
Start: 2023-09-03 | End: 2023-09-04

## 2023-09-03 RX ORDER — SUCRALFATE 1 G/1
1 TABLET ORAL
Status: DISCONTINUED | OUTPATIENT
Start: 2023-09-03 | End: 2023-09-05 | Stop reason: HOSPADM

## 2023-09-03 RX ADMIN — SODIUM CHLORIDE: 9 INJECTION, SOLUTION INTRAVENOUS at 10:09

## 2023-09-03 RX ADMIN — SUCRALFATE 1 G: 1 TABLET ORAL at 09:09

## 2023-09-03 RX ADMIN — HYDROMORPHONE HYDROCHLORIDE 1 MG: 2 INJECTION, SOLUTION INTRAMUSCULAR; INTRAVENOUS; SUBCUTANEOUS at 01:09

## 2023-09-03 RX ADMIN — METRONIDAZOLE 500 MG: 5 INJECTION, SOLUTION INTRAVENOUS at 01:09

## 2023-09-03 RX ADMIN — CIPROFLOXACIN 400 MG: 2 INJECTION, SOLUTION INTRAVENOUS at 02:09

## 2023-09-03 RX ADMIN — ONDANSETRON 4 MG: 2 INJECTION INTRAMUSCULAR; INTRAVENOUS at 01:09

## 2023-09-03 RX ADMIN — METRONIDAZOLE 500 MG: 5 INJECTION, SOLUTION INTRAVENOUS at 08:09

## 2023-09-03 RX ADMIN — METRONIDAZOLE 500 MG: 5 INJECTION, SOLUTION INTRAVENOUS at 04:09

## 2023-09-03 RX ADMIN — TRAMADOL HYDROCHLORIDE 50 MG: 50 TABLET, COATED ORAL at 05:09

## 2023-09-03 RX ADMIN — CIPROFLOXACIN 400 MG: 2 INJECTION, SOLUTION INTRAVENOUS at 03:09

## 2023-09-03 RX ADMIN — HYOSCYAMINE SULFATE 0.12 MG: 0.12 TABLET ORAL at 01:09

## 2023-09-03 NOTE — PROGRESS NOTES
SUBJECTIVE:    Today's info : seen and examined, no acute events overnight. Continues to improve   Remains in pain  Afebrile  Heart rate better  Remains on iv abx    Wbc up - will check procal      REVIEW OF SYSTEMS:  X12 as above.      PHYSICAL EXAMINATION:  VITAL SIGNS:    Vitals:    09/03/23 0319 09/03/23 0323 09/03/23 0710 09/03/23 1124   BP: (!) 142/81  131/77 123/85   Pulse: (!) 59  (!) 57 62   Resp: 18 16     Temp: 97.8 °F (36.6 °C)  98 °F (36.7 °C) 98.2 °F (36.8 °C)   TempSrc: Oral  Oral Oral   SpO2: 96%  96% 97%   Weight:       Height:           GENERAL APPEARANCE:  She is alert, slightly tachycardic.  HEENT:  Normocephalic, atraumatic.  PERRLA.  EOMI.    NECK:  Supple.  There is no JVD, no bruits.  HEART:  Slightly tachycardic.  S1, S2 audible.  LUNGS:  Clear.  ABDOMEN:  Slightly tender in all areas.  There is no rebound.  Bowel sounds are   slightly hyperactive.   GENITORECTAL:  No discharge, normal for age.    EXTREMITIES:  No clubbing, cyanosis.  Trace edema.  She has a left axillary site   that has no erythema, swelling, or drainage.   NEUROLOGIC:  Nonfocal.  Cranial nerves 2-12 are intact.       LABORATORY DATA:    Lab Results   Component Value Date    WBC 24.45 (H) 09/03/2023    HGB 10.8 (L) 09/03/2023    HCT 34.3 (L) 09/03/2023    MCV 87.1 09/03/2023     09/03/2023         Recent Labs   Lab 09/01/23  0545 09/02/23  1339 09/03/23  0428      < > 145   K 3.3*   < > 3.8   CO2 22   < > 24   BUN 10.4   < > 5.5*   CREATININE 0.75   < > 0.71   GLUCOSE 138*   < > 86   CALCIUM 8.5   < > 8.4   PHOS 3.9  --   --     < > = values in this interval not displayed.         IMPRESSION:    1. Acute enterocolitis secondary to norovirus with dehydration and hypokalemia.  2. She has history of low-grade B-cell lymphoma.  3. Hypertension.  4. Dyslipidemia.  5. Diabetes mellitus type 2.  6. Slight leukocytosis, probably reactive.  7. She has history of GERD.  8. PUD.    9. History of vitamin D, folate, and  B12 deficiency.  10. Significant obesity.    PLAN:  We will continue with antiemetics.  We will continue with liquids.  We   will continue with clonidine patch for the time being.  We will place on CSCs   and sliding scale, clear liquids as tolerated.   Gi and dvt ppx

## 2023-09-04 LAB
ABS NEUT (OLG): 7.48 X10(3)/MCL (ref 2.1–9.2)
ALBUMIN SERPL-MCNC: 3.1 G/DL (ref 3.5–5)
ALBUMIN/GLOB SERPL: 1.3 RATIO (ref 1.1–2)
ALP SERPL-CCNC: 66 UNIT/L (ref 40–150)
ALT SERPL-CCNC: 22 UNIT/L (ref 0–55)
AST SERPL-CCNC: 21 UNIT/L (ref 5–34)
BILIRUB SERPL-MCNC: 0.3 MG/DL
BUN SERPL-MCNC: 6.6 MG/DL (ref 7–18.7)
CALCIUM SERPL-MCNC: 8.3 MG/DL (ref 8.4–10.2)
CHLORIDE SERPL-SCNC: 110 MMOL/L (ref 98–107)
CO2 SERPL-SCNC: 26 MMOL/L (ref 22–29)
CREAT SERPL-MCNC: 0.74 MG/DL (ref 0.55–1.02)
EOSINOPHIL NFR BLD MANUAL: 0.27 X10(3)/MCL (ref 0–0.9)
EOSINOPHIL NFR BLD MANUAL: 1 %
ERYTHROCYTE [DISTWIDTH] IN BLOOD BY AUTOMATED COUNT: 14.5 % (ref 11.5–17)
GFR SERPLBLD CREATININE-BSD FMLA CKD-EPI: >60 MLS/MIN/1.73/M2
GLOBULIN SER-MCNC: 2.4 GM/DL (ref 2.4–3.5)
GLUCOSE SERPL-MCNC: 136 MG/DL (ref 74–100)
HCT VFR BLD AUTO: 34.3 % (ref 37–47)
HGB BLD-MCNC: 10.7 G/DL (ref 12–16)
INSTRUMENT WBC (OLG): 26.72 X10(3)/MCL
LYMPHOCYTES NFR BLD MANUAL: 17.9 X10(3)/MCL
LYMPHOCYTES NFR BLD MANUAL: 67 %
MCH RBC QN AUTO: 26.7 PG (ref 27–31)
MCHC RBC AUTO-ENTMCNC: 31.2 G/DL (ref 33–36)
MCV RBC AUTO: 85.5 FL (ref 80–94)
METAMYELOCYTES NFR BLD MANUAL: 1 %
MONOCYTES NFR BLD MANUAL: 1.07 X10(3)/MCL (ref 0.1–1.3)
MONOCYTES NFR BLD MANUAL: 4 %
NEUTROPHILS NFR BLD MANUAL: 28 %
NRBC BLD AUTO-RTO: 0 %
PLATELET # BLD AUTO: 211 X10(3)/MCL (ref 130–400)
PLATELET # BLD EST: NORMAL 10*3/UL
PMV BLD AUTO: 11.3 FL (ref 7.4–10.4)
POCT GLUCOSE: 143 MG/DL (ref 70–110)
POCT GLUCOSE: 75 MG/DL (ref 70–110)
POCT GLUCOSE: 82 MG/DL (ref 70–110)
POCT GLUCOSE: 85 MG/DL (ref 70–110)
POTASSIUM SERPL-SCNC: 3.4 MMOL/L (ref 3.5–5.1)
PROT SERPL-MCNC: 5.5 GM/DL (ref 6.4–8.3)
RBC # BLD AUTO: 4.01 X10(6)/MCL (ref 4.2–5.4)
RBC MORPH BLD: NORMAL
SODIUM SERPL-SCNC: 142 MMOL/L (ref 136–145)
WBC # SPEC AUTO: 27.11 X10(3)/MCL (ref 4.5–11.5)

## 2023-09-04 PROCEDURE — 85027 COMPLETE CBC AUTOMATED: CPT | Performed by: INTERNAL MEDICINE

## 2023-09-04 PROCEDURE — 94761 N-INVAS EAR/PLS OXIMETRY MLT: CPT

## 2023-09-04 PROCEDURE — 25000003 PHARM REV CODE 250: Performed by: INTERNAL MEDICINE

## 2023-09-04 PROCEDURE — 80053 COMPREHEN METABOLIC PANEL: CPT | Performed by: INTERNAL MEDICINE

## 2023-09-04 PROCEDURE — 63600175 PHARM REV CODE 636 W HCPCS: Performed by: NURSE PRACTITIONER

## 2023-09-04 PROCEDURE — 11000001 HC ACUTE MED/SURG PRIVATE ROOM

## 2023-09-04 PROCEDURE — 25000003 PHARM REV CODE 250: Performed by: NURSE PRACTITIONER

## 2023-09-04 RX ORDER — CARVEDILOL 12.5 MG/1
25 TABLET ORAL 2 TIMES DAILY
Status: DISCONTINUED | OUTPATIENT
Start: 2023-09-04 | End: 2023-09-05 | Stop reason: HOSPADM

## 2023-09-04 RX ORDER — TOPIRAMATE 100 MG/1
200 TABLET, FILM COATED ORAL 2 TIMES DAILY
Status: DISCONTINUED | OUTPATIENT
Start: 2023-09-05 | End: 2023-09-05 | Stop reason: HOSPADM

## 2023-09-04 RX ORDER — CLONIDINE 0.2 MG/24H
1 PATCH, EXTENDED RELEASE TRANSDERMAL
Status: DISCONTINUED | OUTPATIENT
Start: 2023-09-04 | End: 2023-09-05 | Stop reason: HOSPADM

## 2023-09-04 RX ORDER — ERGOCALCIFEROL 1.25 MG/1
50000 CAPSULE ORAL
Status: DISCONTINUED | OUTPATIENT
Start: 2023-09-05 | End: 2023-09-05 | Stop reason: HOSPADM

## 2023-09-04 RX ORDER — HYDROCODONE BITARTRATE AND ACETAMINOPHEN 7.5; 325 MG/1; MG/1
1 TABLET ORAL EVERY 6 HOURS PRN
Status: DISCONTINUED | OUTPATIENT
Start: 2023-09-04 | End: 2023-09-05 | Stop reason: HOSPADM

## 2023-09-04 RX ORDER — CLONIDINE HYDROCHLORIDE 0.1 MG/1
0.1 TABLET ORAL
Status: DISCONTINUED | OUTPATIENT
Start: 2023-09-04 | End: 2023-09-05 | Stop reason: HOSPADM

## 2023-09-04 RX ORDER — FOLIC ACID 1 MG/1
1 TABLET ORAL DAILY
Status: DISCONTINUED | OUTPATIENT
Start: 2023-09-05 | End: 2023-09-05 | Stop reason: HOSPADM

## 2023-09-04 RX ORDER — ALBUTEROL SULFATE 90 UG/1
2 AEROSOL, METERED RESPIRATORY (INHALATION) EVERY 6 HOURS PRN
Status: DISCONTINUED | OUTPATIENT
Start: 2023-09-04 | End: 2023-09-05 | Stop reason: HOSPADM

## 2023-09-04 RX ADMIN — SUCRALFATE 1 G: 1 TABLET ORAL at 04:09

## 2023-09-04 RX ADMIN — Medication 6 MG: at 08:09

## 2023-09-04 RX ADMIN — SUCRALFATE 1 G: 1 TABLET ORAL at 06:09

## 2023-09-04 RX ADMIN — CIPROFLOXACIN 400 MG: 2 INJECTION, SOLUTION INTRAVENOUS at 02:09

## 2023-09-04 RX ADMIN — CARVEDILOL 25 MG: 12.5 TABLET, FILM COATED ORAL at 08:09

## 2023-09-04 RX ADMIN — HYDROCODONE BITARTRATE AND ACETAMINOPHEN 1 TABLET: 7.5; 325 TABLET ORAL at 08:09

## 2023-09-04 RX ADMIN — SODIUM CHLORIDE: 9 INJECTION, SOLUTION INTRAVENOUS at 02:09

## 2023-09-04 RX ADMIN — TRAMADOL HYDROCHLORIDE 50 MG: 50 TABLET, COATED ORAL at 01:09

## 2023-09-04 RX ADMIN — METRONIDAZOLE 500 MG: 5 INJECTION, SOLUTION INTRAVENOUS at 09:09

## 2023-09-04 RX ADMIN — SUCRALFATE 1 G: 1 TABLET ORAL at 09:09

## 2023-09-04 RX ADMIN — METRONIDAZOLE 500 MG: 5 INJECTION, SOLUTION INTRAVENOUS at 12:09

## 2023-09-04 RX ADMIN — ONDANSETRON 4 MG: 2 INJECTION INTRAMUSCULAR; INTRAVENOUS at 09:09

## 2023-09-04 RX ADMIN — BUTALBITAL, ACETAMINOPHEN, AND CAFFEINE 1 TABLET: 325; 50; 40 TABLET ORAL at 06:09

## 2023-09-04 RX ADMIN — CARVEDILOL 25 MG: 12.5 TABLET, FILM COATED ORAL at 09:09

## 2023-09-04 NOTE — PLAN OF CARE
Problem: Adult Inpatient Plan of Care  Goal: Plan of Care Review  Outcome: Ongoing, Progressing  Flowsheets (Taken 9/3/2023 1936)  Plan of Care Reviewed With:   patient   family   spouse  Goal: Patient-Specific Goal (Individualized)  Outcome: Ongoing, Progressing  Flowsheets (Taken 9/3/2023 1936)  Anxieties, Fears or Concerns: Diagnosis  Individualized Care Needs: manage pain, iv antibiotics, wound care  Goal: Absence of Hospital-Acquired Illness or Injury  Outcome: Ongoing, Progressing  Intervention: Identify and Manage Fall Risk  Flowsheets (Taken 9/3/2023 1936)  Safety Promotion/Fall Prevention:   assistive device/personal item within reach   family to remain at bedside   side rails raised x 2   nonskid shoes/socks when out of bed  Intervention: Prevent Skin Injury  Flowsheets (Taken 9/3/2023 1936)  Body Position:   position changed independently   weight shifting  Skin Protection:   adhesive use limited   tubing/devices free from skin contact  Intervention: Prevent and Manage VTE (Venous Thromboembolism) Risk  Flowsheets (Taken 9/3/2023 1936)  Activity Management:   Ambulated -L4   Ambulated to bathroom - L4  VTE Prevention/Management:   ambulation promoted   dorsiflexion/plantar flexion performed   fluids promoted   intravenous hydration  Range of Motion:   active ROM (range of motion) encouraged   ROM (range of motion) performed  Intervention: Prevent Infection  Flowsheets (Taken 9/3/2023 1936)  Infection Prevention:   equipment surfaces disinfected   hand hygiene promoted   rest/sleep promoted   single patient room provided  Goal: Optimal Comfort and Wellbeing  Outcome: Ongoing, Progressing  Intervention: Monitor Pain and Promote Comfort  Flowsheets (Taken 9/3/2023 1936)  Pain Management Interventions:   care clustered   relaxation techniques promoted   quiet environment facilitated  Intervention: Provide Person-Centered Care  Flowsheets (Taken 9/3/2023 1936)  Trust Relationship/Rapport:   care explained    choices provided   emotional support provided   empathic listening provided   questions answered  Goal: Readiness for Transition of Care  Outcome: Ongoing, Progressing     Problem: Bariatric Environmental Safety  Goal: Safety Maintained with Care  Outcome: Ongoing, Progressing     Problem: Impaired Wound Healing  Goal: Optimal Wound Healing  Outcome: Ongoing, Progressing  Intervention: Promote Wound Healing  Flowsheets (Taken 9/3/2023 1936)  Sleep/Rest Enhancement:   awakenings minimized   natural light exposure provided   noise level reduced   regular sleep/rest pattern promoted   room darkened  Activity Management:   Ambulated -L4   Ambulated to bathroom - L4  Pain Management Interventions:   care clustered   relaxation techniques promoted   quiet environment facilitated

## 2023-09-04 NOTE — PROGRESS NOTES
[facility]                       [address] [id number]    PATIENT NAME:       TOYA PERRY  YOB: 1975  CSN:                [id number]   MRN:                [id number]  ADMIT DATE:         08/31/2023 13:40:00  PHYSICIAN:          Raj Yusuf MD                            PROGRESS NOTE    DATE:      SUBJECTIVE:  This is a 47-year-old  female, who came on the 31st of August.  She has been doing fair.  She still has some abdominal cramps.  She   has not been progress in her diet.  She is still on clear liquids.  She denies   having any vomiting.  She may have a slight nausea.  She has not had any   shortness of breath.  No chest pain.  No palpitations.  No fever, chills, or   other problems.  Her white cell count went up, this is most likely secondary to   having a virus that decreased her white cell initially and it went up to the   baseline that she was prior to her admission, but like I stated, she has not had   any fever, chills, or any others issues other than what I mentioned above.  She   is still a little bit weak.    REVIEW OF SYSTEMS:  X12 as above.     Blood sugars have been significantly good mostly in the 2 digit range.  Her   blood pressure started to go up some as of today.  She was given some carvedilol   earlier today.  She was off her home meds.    PHYSICAL EXAMINATION:  VITAL SIGNS:  Blood pressure at the present time is 122/79, pulse 60,   temperature 98, bp was 168/101, last temperature was at least 3 days ago.  GENERAL APPEARANCE:  She is alert.  She has a slight nausea and occasional   abdominal cramps.  HEART:  Regular rhythm and rate.  LUNGS:  Clear.  ABDOMEN:  Mild diffuse soreness more than tenderness.  No rebound.  Bowel sounds   are fairly normal.  EXTREMITIES:  No clubbing, cyanosis, or edema.  NEUROLOGIC:  Nonfocal.  Cranial nerves 2 through 12 are  intact.    LABORATORY DATA:  Her white cell count went up to 27.11, H and H 10.7 and 34.3   with a platelet count of 211.  Her potassium was slightly low at 3.4, sodium   142, chloride 110, BUN 6.6, creatinine 0.74, and albumin 3.1.    IMPRESSION:  Acute enterocolitis secondary to norovirus with dehydration and   hypokalemia, low-grade B-cell lymphoma, hypertension, dyslipidemia, diabetes   type 2, history of gastroesophageal reflux disease and PUD, surgical   malabsorption with vitamin D, folate, and B12 deficiency, significant obesity,   obstructive sleep apnea, and obesity hypoventilation syndrome.    PLAN:  We will start to advance diet as tolerated to bland diet.   she does   Have some nausea no vomit.  We will continue on antiemetics.  We will decrease   IV fluids.  We will resume her home meds as able.  We will continue to monitor   sugars and cover as needed.  We will continue p.r.n. Levsin for abdominal   cramps.  We will avoid milk or caffeine.  We will continue to mobilize as   tolerated.  She is on DVT prophylaxis mechanical.  We will go ahead and order a   bone marrow since she was scheduled to have one as an outpatient.        ______________________________  MD JOSE Sanchez/ROSALIE  DD:  09/04/2023  Time:  12:28PM  DT:  09/04/2023  Time:  01:18PM  Job #:  [id number]      PROGRESS NOTE

## 2023-09-05 VITALS
OXYGEN SATURATION: 97 % | BODY MASS INDEX: 43.4 KG/M2 | TEMPERATURE: 98 F | HEART RATE: 50 BPM | HEIGHT: 69 IN | WEIGHT: 293 LBS | RESPIRATION RATE: 18 BRPM | SYSTOLIC BLOOD PRESSURE: 150 MMHG | DIASTOLIC BLOOD PRESSURE: 94 MMHG

## 2023-09-05 PROBLEM — K52.9 AGE (ACUTE GASTROENTERITIS): Status: RESOLVED | Noted: 2023-09-05 | Resolved: 2023-09-05

## 2023-09-05 PROBLEM — K52.9 AGE (ACUTE GASTROENTERITIS): Status: ACTIVE | Noted: 2023-09-05

## 2023-09-05 PROBLEM — E86.0 DEHYDRATION: Status: RESOLVED | Noted: 2023-09-05 | Resolved: 2023-09-05

## 2023-09-05 PROBLEM — E86.0 DEHYDRATION: Status: ACTIVE | Noted: 2023-09-05

## 2023-09-05 LAB
BACTERIA BLD CULT: NORMAL
BACTERIA BLD CULT: NORMAL

## 2023-09-05 PROCEDURE — 25000003 PHARM REV CODE 250: Performed by: INTERNAL MEDICINE

## 2023-09-05 RX ORDER — SUCRALFATE 1 G/1
1 TABLET ORAL
Qty: 60 TABLET | Refills: 0 | Status: SHIPPED | OUTPATIENT
Start: 2023-09-05

## 2023-09-05 RX ORDER — FOLIC ACID 1 MG/1
1 TABLET ORAL DAILY
Qty: 90 TABLET | Refills: 3 | Status: SHIPPED | OUTPATIENT
Start: 2023-09-05 | End: 2024-09-04

## 2023-09-05 RX ORDER — CLONIDINE HYDROCHLORIDE 0.1 MG/1
0.1 TABLET ORAL EVERY 4 HOURS PRN
Qty: 30 TABLET | Refills: 0 | Status: SHIPPED | OUTPATIENT
Start: 2023-09-05 | End: 2024-09-04

## 2023-09-05 RX ORDER — ERGOCALCIFEROL 1.25 MG/1
50000 CAPSULE ORAL
Qty: 12 CAPSULE | Refills: 0 | Status: SHIPPED | OUTPATIENT
Start: 2023-09-05

## 2023-09-05 RX ORDER — CARVEDILOL 25 MG/1
25 TABLET ORAL 2 TIMES DAILY
Qty: 60 TABLET | Refills: 11 | Status: SHIPPED | OUTPATIENT
Start: 2023-09-05 | End: 2024-09-04

## 2023-09-05 RX ORDER — TOPIRAMATE 200 MG/1
200 TABLET ORAL 2 TIMES DAILY
Qty: 60 TABLET | Refills: 11 | Status: SHIPPED | OUTPATIENT
Start: 2023-09-05 | End: 2024-09-04

## 2023-09-05 RX ORDER — LEVOTHYROXINE SODIUM 75 UG/1
75 TABLET ORAL
Qty: 30 TABLET | Refills: 11 | Status: SHIPPED | OUTPATIENT
Start: 2023-09-06 | End: 2024-09-05

## 2023-09-05 RX ADMIN — FOLIC ACID 1 MG: 1 TABLET ORAL at 08:09

## 2023-09-05 RX ADMIN — ERGOCALCIFEROL 50000 UNITS: 1.25 CAPSULE ORAL at 08:09

## 2023-09-05 RX ADMIN — SUCRALFATE 1 G: 1 TABLET ORAL at 11:09

## 2023-09-05 RX ADMIN — CARVEDILOL 25 MG: 12.5 TABLET, FILM COATED ORAL at 08:09

## 2023-09-05 NOTE — NURSING
Discharge paperwork discussed w/ patient. Verbalized understanding and had no questions. IV d/c'd. Immunizations deferred for now. Pt to follow up with PCP regarding vaccinations. All belongings accounted for. NAD noted.

## 2023-09-05 NOTE — PLAN OF CARE
Problem: Adult Inpatient Plan of Care  Goal: Plan of Care Review  Outcome: Met  Goal: Patient-Specific Goal (Individualized)  Outcome: Met  Goal: Absence of Hospital-Acquired Illness or Injury  Outcome: Met  Goal: Optimal Comfort and Wellbeing  Outcome: Met  Goal: Readiness for Transition of Care  Outcome: Met     Problem: Bariatric Environmental Safety  Goal: Safety Maintained with Care  Outcome: Met     Problem: Impaired Wound Healing  Goal: Optimal Wound Healing  Outcome: Met

## 2023-09-06 LAB — PROCALCITONIN SERPL-MCNC: <0.1 NG/ML

## 2023-09-11 NOTE — DISCHARGE SUMMARY
[facility]                       [address] [address]    PATIENT NAME:       TOYA PERRY  YOB: 1975  CSN:                [id number]   MRN:                [id number]  ADMIT DATE:         08/31/2023 13:40:00  PHYSICIAN:          Raj Yusuf MD                          DISCHARGE SUMMARY    DATE OF DISCHARGE:  09/05/2023 13:06:00    HISTORY:  The patient is a 47-year-old  female, well known to me   for several years.  She was in her usual state of health prior to her   admission.  She started to present with significant nausea and vomiting 2 days   prior to her admission.  She had multiple episodes of vomiting as well as loose   stools.  She was unable to keep up any fluids or any food.  She had an elevated   temperature up to 102 or higher than that and abdominal cramps along with the   pain and diarrhea.  She was worked up and she was slightly dehydrated with   slight leukocytosis.  She had an incision from the recent biopsy of a  node that   showed low-grade B-cell lymphoma, marginal zone and this looked okay.  She had   a fluid collection that was consistent with a seroma.  It was not enhancing.    She did have a GI PCR and it showed Norovirus.  She was placed on IV fluids and   her electrolytes were corrected.  She was placed on antiemetics and mild opiate   analgesics.  She had her blood sugars monitored and she was covered as needed.    She was placed on a clear liquid diet initially and she was advanced slowly to a   soft diet.  No caffeine, no milk products.  She did well throughout her   hospital stay and she was discharged in a good and stable condition on the 5th of September.  She was placed on IV antibiotics for short time after Internal   Medicine covers looked at the leukocytosis and was afraid about a bacterial   infection, but this was discontinued as soon as I  came on Monday.  At discharge,   she had a white cell count of 27.1, H and H is 10.7 and 34.3 with a platelet   count of 211.  She had a slight hypokalemia of 3.4, BUN was 6.6, creatinine   0.74.  Her glucoses were good throughout her hospital stay.  Her albumin was   slightly low at 3.1.  The patient continued on her home medications and she was   placed on a BRAT diet.    FINAL DIAGNOSES:  Include acute gastroenteritis secondary to Norovirus.  She has   a history of low-grade B-cell lymphoma, marginal.  She had enterocolitis,   history of hypertension, dyslipidemia, diabetes type 2, peripheral neuropathy,   GERD with PUD in the past, vitamin D deficiency, folate deficiency, B12   deficiency, surgical malabsorption secondary to gastric sleeve, chronic back   pain, history of hypothyroidism and significant obesity.  She has a history of   sleep apnea, migraines, hypersomnia, iron deficiency in the past, depression,   anxiety, fibromyalgia, panic attacks in the past, fatty  liver.    PLAN:  The patient will be seen in 1 to 2 weeks after discharge.  The patient   will continue with her oncologist, has diabetes.  The patient will bring the log   for her blood pressures and blood sugars.  Please refer to discharge paper for   complete list of the medications, medical treatment, and the other   recommendations.        ______________________________  MD JOSE Sanchez/ROSALIE  DD:  09/10/2023  Time:  07:34PM  DT:  09/11/2023  Time:  12:22AM  Job #:  [id number]      DISCHARGE SUMMARY

## 2023-10-13 LAB — PSYCHE PATHOLOGY RESULT: NORMAL

## 2024-02-19 ENCOUNTER — LAB VISIT (OUTPATIENT)
Dept: LAB | Facility: HOSPITAL | Age: 49
End: 2024-02-19
Attending: INTERNAL MEDICINE
Payer: COMMERCIAL

## 2024-02-19 DIAGNOSIS — Z51.81 ENCOUNTER FOR THERAPEUTIC DRUG MONITORING: ICD-10-CM

## 2024-02-19 DIAGNOSIS — M10.9 GOUT, UNSPECIFIED CAUSE, UNSPECIFIED CHRONICITY, UNSPECIFIED SITE: ICD-10-CM

## 2024-02-19 DIAGNOSIS — Z79.899 ENCOUNTER FOR LONG-TERM (CURRENT) USE OF OTHER MEDICATIONS: Primary | ICD-10-CM

## 2024-02-19 DIAGNOSIS — E11.9 DIABETES MELLITUS WITHOUT COMPLICATION: ICD-10-CM

## 2024-02-19 LAB
CHOLEST SERPL-MCNC: 183 MG/DL
CHOLEST/HDLC SERPL: 3 {RATIO} (ref 0–5)
DEPRECATED CALCIDIOL+CALCIFEROL SERPL-MC: 27.8 NG/ML (ref 30–80)
EST. AVERAGE GLUCOSE BLD GHB EST-MCNC: 105.4 MG/DL
HBA1C MFR BLD: 5.3 %
HDLC SERPL-MCNC: 56 MG/DL (ref 35–60)
LDLC SERPL CALC-MCNC: 104 MG/DL (ref 50–140)
TRIGL SERPL-MCNC: 115 MG/DL (ref 37–140)
URATE SERPL-MCNC: 5.9 MG/DL (ref 2.6–6)
VLDLC SERPL CALC-MCNC: 23 MG/DL

## 2024-02-19 PROCEDURE — 80061 LIPID PANEL: CPT

## 2024-02-19 PROCEDURE — 36415 COLL VENOUS BLD VENIPUNCTURE: CPT

## 2024-02-19 PROCEDURE — 82306 VITAMIN D 25 HYDROXY: CPT

## 2024-02-19 PROCEDURE — 83036 HEMOGLOBIN GLYCOSYLATED A1C: CPT

## 2024-02-19 PROCEDURE — 84550 ASSAY OF BLOOD/URIC ACID: CPT

## 2024-04-01 ENCOUNTER — HOSPITAL ENCOUNTER (EMERGENCY)
Facility: HOSPITAL | Age: 49
Discharge: HOME | End: 2024-04-01
Attending: EMERGENCY MEDICINE
Payer: COMMERCIAL

## 2024-04-01 VITALS
RESPIRATION RATE: 16 BRPM | HEART RATE: 78 BPM | HEIGHT: 70 IN | DIASTOLIC BLOOD PRESSURE: 99 MMHG | SYSTOLIC BLOOD PRESSURE: 160 MMHG | WEIGHT: 290 LBS | BODY MASS INDEX: 41.52 KG/M2 | TEMPERATURE: 98 F | OXYGEN SATURATION: 100 %

## 2024-04-01 DIAGNOSIS — T14.8XXA PUNCTURE WOUND: ICD-10-CM

## 2024-04-01 PROCEDURE — 90715 TDAP VACCINE 7 YRS/> IM: CPT | Performed by: EMERGENCY MEDICINE

## 2024-04-01 PROCEDURE — 96372 THER/PROPH/DIAG INJ SC/IM: CPT | Performed by: EMERGENCY MEDICINE

## 2024-04-01 PROCEDURE — 99284 EMERGENCY DEPT VISIT MOD MDM: CPT | Mod: 25

## 2024-04-01 PROCEDURE — 63600175 PHARM REV CODE 636 W HCPCS: Mod: JZ,JG | Performed by: EMERGENCY MEDICINE

## 2024-04-01 PROCEDURE — 25000003 PHARM REV CODE 250: Performed by: EMERGENCY MEDICINE

## 2024-04-01 PROCEDURE — 90471 IMMUNIZATION ADMIN: CPT | Performed by: EMERGENCY MEDICINE

## 2024-04-01 RX ORDER — CIPROFLOXACIN 500 MG/1
500 TABLET ORAL
Status: DISCONTINUED | OUTPATIENT
Start: 2024-04-01 | End: 2024-04-01

## 2024-04-01 RX ORDER — MORPHINE SULFATE 4 MG/ML
4 INJECTION, SOLUTION INTRAMUSCULAR; INTRAVENOUS
Status: COMPLETED | OUTPATIENT
Start: 2024-04-01 | End: 2024-04-01

## 2024-04-01 RX ORDER — AMOXICILLIN AND CLAVULANATE POTASSIUM 875; 125 MG/1; MG/1
1 TABLET, FILM COATED ORAL
Status: COMPLETED | OUTPATIENT
Start: 2024-04-01 | End: 2024-04-01

## 2024-04-01 RX ORDER — AMOXICILLIN AND CLAVULANATE POTASSIUM 875; 125 MG/1; MG/1
1 TABLET, FILM COATED ORAL 2 TIMES DAILY
Qty: 14 TABLET | Refills: 0 | Status: ON HOLD | OUTPATIENT
Start: 2024-04-01 | End: 2024-05-10 | Stop reason: HOSPADM

## 2024-04-01 RX ADMIN — MORPHINE SULFATE 4 MG: 4 INJECTION, SOLUTION INTRAMUSCULAR; INTRAVENOUS at 11:04

## 2024-04-01 RX ADMIN — TETANUS TOXOID, REDUCED DIPHTHERIA TOXOID AND ACELLULAR PERTUSSIS VACCINE, ADSORBED 0.5 ML: 5; 2.5; 8; 8; 2.5 SUSPENSION INTRAMUSCULAR at 11:04

## 2024-04-01 RX ADMIN — AMOXICILLIN AND CLAVULANATE POTASSIUM 1 TABLET: 875; 125 TABLET, FILM COATED ORAL at 11:04

## 2024-04-01 RX ADMIN — BACITRACIN ZINC, NEOMYCIN SULFATE, AND POLYMYXIN B SULFATE: 400; 3.5; 5 OINTMENT TOPICAL at 11:04

## 2024-04-01 NOTE — Clinical Note
"Cristina Lazar (Teneko) was seen and treated in our emergency department on 4/1/2024.  She may return to work on 04/03/2024.       If you have any questions or concerns, please don't hesitate to call.      Heidi Metcalf MD"

## 2024-04-02 NOTE — ED PROVIDER NOTES
Encounter Date: 4/1/2024       History     Chief Complaint   Patient presents with    Foot Injury     48-year-old female states that she stepped on a nail.  It punctured through a shoe which was a slipper with a thick rubber sole.  She complains of pain at the site of the puncture  to the bottom of the foot on the left just proximal to the head of the second metatarsal.  There appears to be a very small red dot at the point of entry.        Review of patient's allergies indicates:  No Known Allergies  Past Medical History:   Diagnosis Date    Arthritis     CLL (chronic lymphocytic leukemia)     Diabetes mellitus     Hypertension     Migraines     Sleep apnea     cpap    Thyroid disease     states recently instructed she could stop thyroid med.     Past Surgical History:   Procedure Laterality Date    BIOPSY OF AXILLARY NODE Left 8/18/2023    Procedure: BIOPSY, LYMPH NODE, AXILLARY;  Surgeon: Sean Styles MD;  Location: [facility];  Service: General;  Laterality: Left;    CARPAL TUNNEL RELEASE Right     CHOLECYSTECTOMY      HYSTERECTOMY      KNEE LIGAMENT RECONSTRUCTION Right     LYMPH NODE BIOPSY Left 8/18/2023    Procedure: BIOPSY, LYMPH NODE Left  Axilla 2cm;  Surgeon: Sean Styles MD;  Location: [facility] OR;  Service: General;  Laterality: Left;    DIANA-EN-Y PROCEDURE      rt foot Right     for neuropathy    TONSILLECTOMY AND ADENOIDECTOMY      TUBAL LIGATION       Family History   Problem Relation Age of Onset    Diabetes Mother     Heart failure Father      Social History     Tobacco Use    Smoking status: Never    Smokeless tobacco: Never   Substance Use Topics    Alcohol use: Yes     Comment: wine socially    Drug use: Never     Review of Systems   Skin:  Positive for wound.   All other systems reviewed and are negative.      Physical Exam     Initial Vitals [04/01/24 2253]   BP Pulse Resp Temp SpO2   (!) 160/99 78 18 98.1 °F (36.7 °C) 100 %      MAP       --         Physical Exam    Nursing note and  vitals reviewed.  Constitutional: She appears well-developed and well-nourished.   Pulmonary/Chest: No respiratory distress.   Musculoskeletal:      Comments: See image above which shows a very small red dot to the ball of the foot, no palpable abnormality, very tender     Neurological: She is alert and oriented to person, place, and time.   Skin: Skin is warm and dry.         ED Course   Procedures  Labs Reviewed - No data to display       Imaging Results              X-Ray Foot Complete Left (Preliminary result)  Result time 04/01/24 23:25:40      Wet Read by Heidi Metcalf MD (04/01/24 23:25:40, [facility] - Emergency Dept, Emergency Medicine)    No foreign body or fracture                                     Medications   neomycin-bacitracin-polymyxin ointment ( Topical (Top) Given 4/1/24 2318)   morphine injection 4 mg (4 mg Intramuscular Given 4/1/24 2318)   amoxicillin-clavulanate 875-125mg per tablet 1 tablet (1 tablet Oral Given 4/1/24 2318)   Tdap (BOOSTRIX) vaccine injection 0.5 mL (0.5 mLs Intramuscular Given 4/1/24 2321)     Medical Decision Making  See HPI for narrative    Differential diagnosis includes but is not limited to puncture wound, retained foreign body, fracture, soft tissue injury    Amount and/or Complexity of Data Reviewed  Radiology: ordered and independent interpretation performed. Decision-making details documented in ED Course.  Discussion of management or test interpretation with external provider(s): Patient was seen and evaluated in the Emergency Department with history, physical exam, and x-ray.  The puncture wound looks minimal but she is high risk for infection due to the shoe that she was wearing, her immunocompromised state from her chemotherapy, and her diabetes.  I have given her a dose of Augmentin in the emergency room here and will give her prescription for Augmentin.  I had considered giving her Cipro but that interacts with her chemotherapy  medication.  She was given a dose of morphine for pain and a tetanus vaccination.  Wound was cleaned, antibiotic ointment and bandage was applied.  Risk of infection was discussed and importance of following up with her doctor should she experience any worsening pain in her foot was discussed as well.  She is stable for discharge home.    Risk  OTC drugs.  Prescription drug management.               ED Course as of 04/01/24 2325 Mon Apr 01, 2024 2325 X-Ray Foot Complete Left  Negative on prelim reading [SH]      ED Course User Index  [SH] Heidi Metcalf MD                           Clinical Impression:  Final diagnoses:  [T14.8XXA] Puncture wound          ED Disposition Condition    Discharge Stable          ED Prescriptions       Medication Sig Dispense Start Date End Date Auth. Provider    amoxicillin-clavulanate 875-125mg (AUGMENTIN) 875-125 mg per tablet Take 1 tablet by mouth 2 (two) times daily. 14 tablet 4/1/2024 -- Heidi Metcalf MD          Follow-up Information       Follow up With Specialties Details Why Contact Info    Raj Yusuf MD Internal Medicine  As needed [address]  [address]  [facility] [address]  [phone]               Heidi Metcalf MD  04/01/24 2321       Heidi Metcalf MD  04/01/24 2326

## 2024-05-06 ENCOUNTER — HOSPITAL ENCOUNTER (INPATIENT)
Facility: HOSPITAL | Age: 49
LOS: 4 days | Discharge: HOME | DRG: 552 | End: 2024-05-10
Attending: EMERGENCY MEDICINE | Admitting: INTERNAL MEDICINE
Payer: COMMERCIAL

## 2024-05-06 DIAGNOSIS — G45.9 TIA (TRANSIENT ISCHEMIC ATTACK): Primary | ICD-10-CM

## 2024-05-06 DIAGNOSIS — R20.2 NUMBNESS AND TINGLING OF RIGHT ARM: ICD-10-CM

## 2024-05-06 DIAGNOSIS — I20.0 UNSTABLE ANGINA: ICD-10-CM

## 2024-05-06 DIAGNOSIS — R20.0 NUMBNESS AND TINGLING OF RIGHT ARM: ICD-10-CM

## 2024-05-06 DIAGNOSIS — G62.9 NEUROPATHY: ICD-10-CM

## 2024-05-06 LAB
ABS NEUT (OLG): 8.23 X10(3)/MCL (ref 2.1–9.2)
ALBUMIN SERPL-MCNC: 3.8 G/DL (ref 3.5–5)
ALBUMIN/GLOB SERPL: 1.1 RATIO (ref 1.1–2)
ALP SERPL-CCNC: 83 UNIT/L (ref 40–150)
ALT SERPL-CCNC: 12 UNIT/L (ref 0–55)
APPEARANCE UR: CLEAR
AST SERPL-CCNC: 12 UNIT/L (ref 5–34)
AV INDEX (PROSTH): 0.8
AV MEAN GRADIENT: 5 MMHG
AV PEAK GRADIENT: 10 MMHG
AV VELOCITY RATIO: 0.73
BACTERIA #/AREA URNS AUTO: ABNORMAL /HPF
BASOPHILS NFR BLD MANUAL: 0.29 X10(3)/MCL (ref 0–0.2)
BASOPHILS NFR BLD MANUAL: 1 %
BILIRUB SERPL-MCNC: 0.8 MG/DL
BILIRUB UR QL STRIP.AUTO: NEGATIVE
BSA FOR ECHO PROCEDURE: 2.53 M2
BUN SERPL-MCNC: 11.8 MG/DL (ref 9.8–20.1)
CALCIUM SERPL-MCNC: 9.3 MG/DL (ref 8.4–10.2)
CHLORIDE SERPL-SCNC: 108 MMOL/L (ref 98–107)
CHOLEST SERPL-MCNC: 174 MG/DL
CHOLEST/HDLC SERPL: 4 {RATIO} (ref 0–5)
CO2 SERPL-SCNC: 24 MMOL/L (ref 22–29)
COLOR UR AUTO: ABNORMAL
CREAT SERPL-MCNC: 0.72 MG/DL (ref 0.55–1.02)
CV ECHO LV RWT: 0.92 CM
DOP CALC AO PEAK VEL: 1.57 M/S
DOP CALC AO VTI: 32.6 CM
DOP CALC LVOT PEAK VEL: 1.14 M/S
DOP CALCLVOT PEAK VEL VTI: 26.2 CM
E WAVE DECELERATION TIME: 246 MSEC
E/A RATIO: 1.13
E/E' RATIO: 8.43 M/S
ECHO LV POSTERIOR WALL: 1.7 CM (ref 0.6–1.1)
EOSINOPHIL NFR BLD MANUAL: 0.29 X10(3)/MCL (ref 0–0.9)
EOSINOPHIL NFR BLD MANUAL: 1 %
ERYTHROCYTE [DISTWIDTH] IN BLOOD BY AUTOMATED COUNT: 14 % (ref 11.5–17)
EST. AVERAGE GLUCOSE BLD GHB EST-MCNC: 99.7 MG/DL
FRACTIONAL SHORTENING: 32 % (ref 28–44)
GFR SERPLBLD CREATININE-BSD FMLA CKD-EPI: >60 MLS/MIN/1.73/M2
GLOBULIN SER-MCNC: 3.4 GM/DL (ref 2.4–3.5)
GLUCOSE SERPL-MCNC: 93 MG/DL (ref 74–100)
GLUCOSE UR QL STRIP.AUTO: NORMAL
HBA1C MFR BLD: 5.1 %
HCT VFR BLD AUTO: 38.6 % (ref 37–47)
HDLC SERPL-MCNC: 48 MG/DL (ref 35–60)
HGB BLD-MCNC: 12.4 G/DL (ref 12–16)
INSTRUMENT WBC (OLG): 29.38 X10(3)/MCL
INTERVENTRICULAR SEPTUM: 1.4 CM (ref 0.6–1.1)
KETONES UR QL STRIP.AUTO: NEGATIVE
LDLC SERPL CALC-MCNC: 105 MG/DL (ref 50–140)
LEFT ATRIUM SIZE: 3.6 CM
LEFT ATRIUM VOLUME INDEX MOD: 24.5 ML/M2
LEFT ATRIUM VOLUME MOD: 59.2 CM3
LEFT INTERNAL DIMENSION IN SYSTOLE: 2.5 CM (ref 2.1–4)
LEFT VENTRICLE DIASTOLIC VOLUME INDEX: 24.01 ML/M2
LEFT VENTRICLE DIASTOLIC VOLUME: 58.1 ML
LEFT VENTRICLE MASS INDEX: 91 G/M2
LEFT VENTRICLE SYSTOLIC VOLUME INDEX: 9.2 ML/M2
LEFT VENTRICLE SYSTOLIC VOLUME: 22.3 ML
LEFT VENTRICULAR INTERNAL DIMENSION IN DIASTOLE: 3.7 CM (ref 3.5–6)
LEFT VENTRICULAR MASS: 220.06 G
LEUKOCYTE ESTERASE UR QL STRIP.AUTO: NEGATIVE
LV LATERAL E/E' RATIO: 8.43 M/S
LV SEPTAL E/E' RATIO: 8.43 M/S
LVOT MG: 3 MMHG
LVOT MV: 0.77 CM/S
LYMPHOCYTES NFR BLD MANUAL: 20.57 X10(3)/MCL
LYMPHOCYTES NFR BLD MANUAL: 70 %
MCH RBC QN AUTO: 27.6 PG (ref 27–31)
MCHC RBC AUTO-ENTMCNC: 32.1 G/DL (ref 33–36)
MCV RBC AUTO: 85.8 FL (ref 80–94)
MONOCYTES NFR BLD MANUAL: 0.29 X10(3)/MCL (ref 0.1–1.3)
MONOCYTES NFR BLD MANUAL: 1 %
MUCOUS THREADS URNS QL MICRO: ABNORMAL /LPF
MV PEAK A VEL: 0.52 M/S
MV PEAK E VEL: 0.59 M/S
NEUTROPHILS NFR BLD MANUAL: 28 %
NITRITE UR QL STRIP.AUTO: NEGATIVE
NRBC BLD AUTO-RTO: 0 %
OHS LV EJECTION FRACTION SIMPSONS BIPLANE MOD: 64 %
OHS QRS DURATION: 84 MS
OHS QTC CALCULATION: 475 MS
PH UR STRIP.AUTO: 5.5 [PH]
PLATELET # BLD AUTO: 251 X10(3)/MCL (ref 130–400)
PLATELET # BLD EST: ADEQUATE 10*3/UL
PMV BLD AUTO: 11 FL (ref 7.4–10.4)
POTASSIUM SERPL-SCNC: 3.7 MMOL/L (ref 3.5–5.1)
PROT SERPL-MCNC: 7.2 GM/DL (ref 6.4–8.3)
PROT UR QL STRIP.AUTO: NEGATIVE
PV PEAK GRADIENT: 5 MMHG
PV PEAK VELOCITY: 1.09 M/S
RBC # BLD AUTO: 4.5 X10(6)/MCL (ref 4.2–5.4)
RBC #/AREA URNS AUTO: ABNORMAL /HPF
RBC MORPH BLD: NORMAL
RBC UR QL AUTO: NEGATIVE
SODIUM SERPL-SCNC: 143 MMOL/L (ref 136–145)
SP GR UR STRIP.AUTO: 1.02 (ref 1–1.03)
SQUAMOUS #/AREA URNS LPF: ABNORMAL /HPF
TDI LATERAL: 0.07 M/S
TDI SEPTAL: 0.07 M/S
TDI: 0.07 M/S
TRICUSPID ANNULAR PLANE SYSTOLIC EXCURSION: 1.8 CM
TRIGL SERPL-MCNC: 106 MG/DL (ref 37–140)
TROPONIN I SERPL-MCNC: <0.01 NG/ML (ref 0–0.04)
TSH SERPL-ACNC: 4.96 UIU/ML (ref 0.35–4.94)
UROBILINOGEN UR STRIP-ACNC: NORMAL
VLDLC SERPL CALC-MCNC: 21 MG/DL
WBC # SPEC AUTO: 29.38 X10(3)/MCL (ref 4.5–11.5)
WBC #/AREA URNS AUTO: ABNORMAL /HPF
Z-SCORE OF LEFT VENTRICULAR DIMENSION IN END DIASTOLE: -11.29
Z-SCORE OF LEFT VENTRICULAR DIMENSION IN END SYSTOLE: -7.95

## 2024-05-06 PROCEDURE — 99285 EMERGENCY DEPT VISIT HI MDM: CPT | Mod: 25

## 2024-05-06 PROCEDURE — 63600175 PHARM REV CODE 636 W HCPCS: Performed by: PHYSICIAN ASSISTANT

## 2024-05-06 PROCEDURE — 96361 HYDRATE IV INFUSION ADD-ON: CPT

## 2024-05-06 PROCEDURE — 80061 LIPID PANEL: CPT

## 2024-05-06 PROCEDURE — 84484 ASSAY OF TROPONIN QUANT: CPT | Performed by: PHYSICIAN ASSISTANT

## 2024-05-06 PROCEDURE — 25500020 PHARM REV CODE 255: Performed by: INTERNAL MEDICINE

## 2024-05-06 PROCEDURE — 99223 1ST HOSP IP/OBS HIGH 75: CPT | Mod: FS,,, | Performed by: PSYCHIATRY & NEUROLOGY

## 2024-05-06 PROCEDURE — 93010 ELECTROCARDIOGRAM REPORT: CPT | Mod: ,,, | Performed by: INTERNAL MEDICINE

## 2024-05-06 PROCEDURE — 11000001 HC ACUTE MED/SURG PRIVATE ROOM

## 2024-05-06 PROCEDURE — 80053 COMPREHEN METABOLIC PANEL: CPT | Performed by: PHYSICIAN ASSISTANT

## 2024-05-06 PROCEDURE — 96375 TX/PRO/DX INJ NEW DRUG ADDON: CPT

## 2024-05-06 PROCEDURE — 81001 URINALYSIS AUTO W/SCOPE: CPT | Performed by: PHYSICIAN ASSISTANT

## 2024-05-06 PROCEDURE — 83036 HEMOGLOBIN GLYCOSYLATED A1C: CPT

## 2024-05-06 PROCEDURE — 96374 THER/PROPH/DIAG INJ IV PUSH: CPT

## 2024-05-06 PROCEDURE — 84443 ASSAY THYROID STIM HORMONE: CPT

## 2024-05-06 PROCEDURE — 93005 ELECTROCARDIOGRAM TRACING: CPT

## 2024-05-06 PROCEDURE — 85027 COMPLETE CBC AUTOMATED: CPT | Performed by: PHYSICIAN ASSISTANT

## 2024-05-06 PROCEDURE — 25000003 PHARM REV CODE 250: Performed by: PHYSICIAN ASSISTANT

## 2024-05-06 RX ORDER — MORPHINE SULFATE 4 MG/ML
2 INJECTION, SOLUTION INTRAMUSCULAR; INTRAVENOUS EVERY 4 HOURS PRN
Status: DISCONTINUED | OUTPATIENT
Start: 2024-05-06 | End: 2024-05-10 | Stop reason: HOSPADM

## 2024-05-06 RX ORDER — ONDANSETRON HYDROCHLORIDE 2 MG/ML
4 INJECTION, SOLUTION INTRAVENOUS
Status: COMPLETED | OUTPATIENT
Start: 2024-05-06 | End: 2024-05-06

## 2024-05-06 RX ORDER — ATORVASTATIN CALCIUM 40 MG/1
40 TABLET, FILM COATED ORAL DAILY
Status: DISCONTINUED | OUTPATIENT
Start: 2024-05-07 | End: 2024-05-10 | Stop reason: HOSPADM

## 2024-05-06 RX ORDER — MORPHINE SULFATE 4 MG/ML
4 INJECTION, SOLUTION INTRAMUSCULAR; INTRAVENOUS
Status: COMPLETED | OUTPATIENT
Start: 2024-05-06 | End: 2024-05-06

## 2024-05-06 RX ORDER — ASPIRIN 81 MG/1
81 TABLET ORAL DAILY
Status: DISCONTINUED | OUTPATIENT
Start: 2024-05-07 | End: 2024-05-10 | Stop reason: HOSPADM

## 2024-05-06 RX ORDER — ASPIRIN 325 MG
325 TABLET, DELAYED RELEASE (ENTERIC COATED) ORAL
Status: COMPLETED | OUTPATIENT
Start: 2024-05-06 | End: 2024-05-06

## 2024-05-06 RX ORDER — SODIUM CHLORIDE 0.9 % (FLUSH) 0.9 %
10 SYRINGE (ML) INJECTION
Status: DISCONTINUED | OUTPATIENT
Start: 2024-05-06 | End: 2024-05-10 | Stop reason: HOSPADM

## 2024-05-06 RX ORDER — ACETAMINOPHEN 325 MG/1
650 TABLET ORAL EVERY 8 HOURS PRN
Status: DISCONTINUED | OUTPATIENT
Start: 2024-05-06 | End: 2024-05-10 | Stop reason: HOSPADM

## 2024-05-06 RX ORDER — ONDANSETRON HYDROCHLORIDE 2 MG/ML
4 INJECTION, SOLUTION INTRAVENOUS EVERY 8 HOURS PRN
Status: DISCONTINUED | OUTPATIENT
Start: 2024-05-06 | End: 2024-05-10 | Stop reason: HOSPADM

## 2024-05-06 RX ORDER — TALC
6 POWDER (GRAM) TOPICAL NIGHTLY PRN
Status: DISCONTINUED | OUTPATIENT
Start: 2024-05-06 | End: 2024-05-10 | Stop reason: HOSPADM

## 2024-05-06 RX ORDER — ACETAMINOPHEN 500 MG
1000 TABLET ORAL
Status: COMPLETED | OUTPATIENT
Start: 2024-05-06 | End: 2024-05-06

## 2024-05-06 RX ADMIN — ONDANSETRON 4 MG: 2 INJECTION INTRAMUSCULAR; INTRAVENOUS at 03:05

## 2024-05-06 RX ADMIN — MORPHINE SULFATE 2 MG: 4 INJECTION, SOLUTION INTRAMUSCULAR; INTRAVENOUS at 08:05

## 2024-05-06 RX ADMIN — IOHEXOL 75 ML: 350 INJECTION, SOLUTION INTRAVENOUS at 06:05

## 2024-05-06 RX ADMIN — MORPHINE SULFATE 4 MG: 4 INJECTION INTRAVENOUS at 01:05

## 2024-05-06 RX ADMIN — Medication 6 MG: at 08:05

## 2024-05-06 RX ADMIN — ONDANSETRON 4 MG: 2 INJECTION INTRAMUSCULAR; INTRAVENOUS at 01:05

## 2024-05-06 RX ADMIN — ASPIRIN 325 MG: 325 TABLET, COATED ORAL at 01:05

## 2024-05-06 RX ADMIN — SODIUM CHLORIDE 1000 ML: 9 INJECTION, SOLUTION INTRAVENOUS at 01:05

## 2024-05-06 RX ADMIN — ACETAMINOPHEN 1000 MG: 500 TABLET ORAL at 05:05

## 2024-05-06 RX ADMIN — NITROGLYCERIN 1 INCH: 20 OINTMENT TOPICAL at 02:05

## 2024-05-06 NOTE — SUBJECTIVE & OBJECTIVE
Past Medical History:   Diagnosis Date    Arthritis     CLL (chronic lymphocytic leukemia)     Diabetes mellitus     Hypertension     Migraines     Sleep apnea     cpap    Thyroid disease     states recently instructed she could stop thyroid med.       Past Surgical History:   Procedure Laterality Date    BIOPSY OF AXILLARY NODE Left 8/18/2023    Procedure: BIOPSY, LYMPH NODE, AXILLARY;  Surgeon: Sean Styles MD;  Location: [facility];  Service: General;  Laterality: Left;    CARPAL TUNNEL RELEASE Right     CHOLECYSTECTOMY      HYSTERECTOMY      KNEE LIGAMENT RECONSTRUCTION Right     LYMPH NODE BIOPSY Left 8/18/2023    Procedure: BIOPSY, LYMPH NODE Left  Axilla 2cm;  Surgeon: Sean Styles MD;  Location: [facility] OR;  Service: General;  Laterality: Left;    DIANA-EN-Y PROCEDURE      rt foot Right     for neuropathy    TONSILLECTOMY AND ADENOIDECTOMY      TUBAL LIGATION         Review of patient's allergies indicates:  No Known Allergies    Current Neurological Medications:     Current Facility-Administered Medications   Medication Dose Route Frequency Provider Last Rate Last Admin    acetaminophen tablet 650 mg  650 mg Oral Q8H PRN Bakari Brasher PA-C        melatonin tablet 6 mg  6 mg Oral Nightly PRN Bakari Brasher PA-C        morphine injection 2 mg  2 mg Intravenous Q4H PRN Bakari Brasher PA-C        ondansetron injection 4 mg  4 mg Intravenous Q8H PRN Bakari Brasher PA-C   4 mg at 05/06/24 1555    sodium chloride 0.9% flush 10 mL  10 mL Intravenous PRN Bakari Brasher PA-C         Current Outpatient Medications   Medication Sig Dispense Refill    albuterol (PROAIR HFA) 90 mcg/actuation inhaler Inhale 2 puffs into the lungs every 6 (six) hours as needed for Wheezing. Rescue 8 g 0    amoxicillin-clavulanate 875-125mg (AUGMENTIN) 875-125 mg per tablet Take 1 tablet by mouth 2 (two) times daily. 14 tablet 0    carvediloL (COREG) 25 MG tablet Take 1 tablet (25 mg total) by mouth 2 (two) times  daily. 60 tablet 11    cloNIDine (CATAPRES) 0.1 MG tablet Take 1 tablet (0.1 mg total) by mouth every 4 (four) hours as needed (BP over 160). 30 tablet 0    cloNIDine 0.2 mg/24 hr td ptwk (CATAPRES) 0.2 mg/24 hr 1 patch every 7 days.      ergocalciferol (ERGOCALCIFEROL) 50,000 unit Cap Take 1 capsule (50,000 Units total) by mouth every 7 days. 12 capsule 0    folic acid (FOLVITE) 1 MG tablet Take 1 tablet (1 mg total) by mouth once daily. 90 tablet 3    hydrALAZINE (APRESOLINE) 100 MG tablet Take 150 mg by mouth 2 (two) times daily.      HYDROcodone-acetaminophen (NORCO) 7.5-325 mg per tablet Take 1 tablet by mouth every 6 (six) hours as needed for Pain. 10 tablet 0    levothyroxine (SYNTHROID) 75 MCG tablet Take 1 tablet (75 mcg total) by mouth before breakfast. 30 tablet 11    metFORMIN (GLUCOPHAGE) 1000 MG tablet Take 1,000 mg by mouth once daily.      PREMARIN vaginal cream Place vaginally.      sucralfate (CARAFATE) 1 gram tablet Take 1 tablet (1 g total) by mouth 4 (four) times daily before meals and nightly. 60 tablet 0    topiramate (TOPAMAX) 200 MG Tab Take 1 tablet (200 mg total) by mouth 2 (two) times daily. 60 tablet 11     Family History       Problem Relation (Age of Onset)    Diabetes Mother    Heart failure Father          Tobacco Use    Smoking status: Never    Smokeless tobacco: Never   Substance and Sexual Activity    Alcohol use: Yes     Comment: wine socially    Drug use: Never    Sexual activity: Yes     Review of Systems   Constitutional:  Positive for activity change and fatigue.   HENT:  Positive for nosebleeds.    Eyes:  Positive for visual disturbance (blurry vision).   Cardiovascular:  Positive for chest pain and palpitations.   Neurological:  Positive for weakness (right leg and arm) and headaches.     Objective:     Vital Signs (Most Recent):  Temp: 98.5 °F (36.9 °C) (05/06/24 1122)  Pulse: 62 (05/06/24 1619)  Resp: 17 (05/06/24 1619)  BP: (!) 130/95 (05/06/24 1502)  SpO2: 100 %  (05/06/24 1619) Vital Signs (24h Range):  Temp:  [98.5 °F (36.9 °C)] 98.5 °F (36.9 °C)  Pulse:  [56-66] 62  Resp:  [14-20] 17  SpO2:  [97 %-100 %] 100 %  BP: (128-164)/(89-95) 130/95     Weight: 131.5 kg (290 lb)  Body mass index is 42.83 kg/m².     Physical Exam  Vitals and nursing note reviewed.   Constitutional:       Appearance: She is obese.   HENT:      Head: Normocephalic and atraumatic.      Nose: Nose normal.      Mouth/Throat:      Mouth: Mucous membranes are moist.   Eyes:      Extraocular Movements: Extraocular movements intact.      Pupils: Pupils are equal, round, and reactive to light.   Cardiovascular:      Rate and Rhythm: Regular rhythm.   Abdominal:      Palpations: Abdomen is soft.   Musculoskeletal:         General: Normal range of motion.      Cervical back: Normal range of motion and neck supple.   Skin:     General: Skin is warm and dry.      Capillary Refill: Capillary refill takes less than 2 seconds.   Neurological:      Mental Status: She is alert and oriented to person, place, and time.      Sensory: Sensory deficit (right arm and right leg) present.      Motor: Weakness (right hand with mild pronator drift. Right dorsiflexion and plantar flexion) present.          NEUROLOGICAL EXAMINATION:     MENTAL STATUS   Oriented to person, place, and time.     CRANIAL NERVES     CN III, IV, VI   Pupils are equal, round, and reactive to light.      Significant Labs: CBC:   Recent Labs   Lab 05/06/24  1144   WBC 29.38  29.38*   HGB 12.4   HCT 38.6        CMP:   Recent Labs   Lab 05/06/24  1144      K 3.7   CO2 24   BUN 11.8   CREATININE 0.72   CALCIUM 9.3   ALBUMIN 3.8   BILITOT 0.8   ALKPHOS 83   AST 12   ALT 12       Significant Imaging: I have reviewed all pertinent imaging results/findings within the past 24 hours.

## 2024-05-06 NOTE — ED PROVIDER NOTES
Encounter Date: 5/6/2024       History     Chief Complaint   Patient presents with    Chest Pain     Intermittent midsternal CP starting last night, SOB. also reports R side tingling/numbness starting this morning. Hx of leukemia, HTN, DM     48-year-old female with history of CLL currently on twice a day oral chemo, diabetes, GERD, and hypertension presents to the ED with complaints of intermittent substernal chest pain, SOB, and palpitations that began Friday with associated right arm tingling. States she thought she was having a heart attack so she took one of her 's nitro pills however got no relief. States that she feels like she has been dragging the right leg over the weekend and has some blurred vision as well. Patient is also c/o n/v/d. Denies fever, chills, abdominal pain, leg pain or swelling, dizziness. No known sick contacts. PCP is Dr Yusuf and cardiologist is Dr Vnan; oncologist is at [facility]     The history is provided by the patient. No  was used.     Review of patient's allergies indicates:  No Known Allergies  Past Medical History:   Diagnosis Date    Arthritis     CLL (chronic lymphocytic leukemia)     Diabetes mellitus     Hypertension     Migraines     Sleep apnea     cpap    Thyroid disease     states recently instructed she could stop thyroid med.     Past Surgical History:   Procedure Laterality Date    BIOPSY OF AXILLARY NODE Left 8/18/2023    Procedure: BIOPSY, LYMPH NODE, AXILLARY;  Surgeon: Sean Styles MD;  Location: [facility] OR;  Service: General;  Laterality: Left;    CARPAL TUNNEL RELEASE Right     CHOLECYSTECTOMY      HYSTERECTOMY      KNEE LIGAMENT RECONSTRUCTION Right     LYMPH NODE BIOPSY Left 8/18/2023    Procedure: BIOPSY, LYMPH NODE Left  Axilla 2cm;  Surgeon: Sean Styles MD;  Location: [facility] OR;  Service: General;  Laterality: Left;    DIANA-EN-Y PROCEDURE      rt foot Right     for neuropathy    TONSILLECTOMY AND ADENOIDECTOMY      TUBAL  LIGATION       Family History   Problem Relation Name Age of Onset    Diabetes Mother      Heart failure Father       Social History     Tobacco Use    Smoking status: Never    Smokeless tobacco: Never   Substance Use Topics    Alcohol use: Yes     Comment: wine socially    Drug use: Never     Review of Systems   Constitutional:  Negative for chills and fever.   Eyes:  Negative for visual disturbance.   Respiratory:  Positive for shortness of breath. Negative for cough.    Cardiovascular:  Positive for chest pain and palpitations.   Gastrointestinal:  Positive for nausea and vomiting. Negative for abdominal pain.   Genitourinary:  Negative for dysuria.   Musculoskeletal:  Negative for arthralgias.   Skin:  Negative for color change and rash.   Neurological:  Positive for weakness, numbness and headaches. Negative for dizziness, facial asymmetry and speech difficulty.   Psychiatric/Behavioral:  Positive for confusion. Negative for behavioral problems.    All other systems reviewed and are negative.      Physical Exam     Initial Vitals [05/06/24 1122]   BP Pulse Resp Temp SpO2   (!) 164/89 66 20 98.5 °F (36.9 °C) 97 %      MAP       --         Physical Exam    Nursing note and vitals reviewed.  Constitutional: She appears well-developed and well-nourished.   HENT:   Head: Normocephalic and atraumatic.   Eyes: EOM are normal. Pupils are equal, round, and reactive to light.   Neck: Neck supple.   Cardiovascular:  Normal rate, regular rhythm and normal heart sounds.           Pulmonary/Chest: Breath sounds normal.   Abdominal: Abdomen is soft. Bowel sounds are normal. She exhibits no distension. There is no abdominal tenderness. There is no rebound and no guarding.   Musculoskeletal:         General: Normal range of motion.      Cervical back: Neck supple.     Neurological: She is alert and oriented to person, place, and time. She has normal strength. GCS score is 15. GCS eye subscore is 4. GCS verbal subscore is 5. GCS  motor subscore is 6.   4/5 strength to RUE, 3/5 to RLE; 5/5 strength to LUE and LLE; finger to nose abnroaml on the right; unable to hold right arm up for pronator drift exam; no facial droop or slurred speech noted on exam    Skin: Skin is warm and dry.   Psychiatric: Her speech is normal and behavior is normal. Thought content normal. Her mood appears anxious.         ED Course   Procedures  Labs Reviewed   COMPREHENSIVE METABOLIC PANEL - Abnormal; Notable for the following components:       Result Value    Chloride 108 (*)     All other components within normal limits   URINALYSIS, REFLEX TO URINE CULTURE - Abnormal; Notable for the following components:    Mucous, UA Trace (*)     All other components within normal limits   CBC WITH DIFFERENTIAL - Abnormal; Notable for the following components:    WBC 29.38 (*)     MCHC 32.1 (*)     MPV 11.0 (*)     All other components within normal limits   MANUAL DIFFERENTIAL - Abnormal; Notable for the following components:    Lymphs Abs 20.566 (*)     Basophils Abs 0.2938 (*)     All other components within normal limits   TROPONIN I - Normal   TROPONIN I - Normal   CBC W/ AUTO DIFFERENTIAL    Narrative:     The following orders were created for panel order CBC auto differential.  Procedure                               Abnormality         Status                     ---------                               -----------         ------                     CBC with Differential[[id number]]       Abnormal            Final result               Manual Differential[[id number]]         Abnormal            Final result                 Please view results for these tests on the individual orders.     EKG Readings: (Independently Interpreted)   Initial Reading: No STEMI. Rhythm: Sinus Arrhythmia. Heart Rate: 80. Ectopy: No Ectopy. Conduction: Normal. ST Segments: Normal ST Segments. T Waves: Normal. Axis: Normal.     ECG Results              EKG 12-lead (Final result)        Collection  Time Result Time QRS Duration OHS QTC Calculation    05/06/24 11:16:49 05/06/24 13:37:41 84 475                     Final result by Interface, Lab In [facility] (05/06/24 13:37:53)                   Narrative:    Test Reason : R07.9,    Vent. Rate : 080 BPM     Atrial Rate : 080 BPM     P-R Int : 194 ms          QRS Dur : 084 ms      QT Int : 412 ms       P-R-T Axes : 044 007 007 degrees     QTc Int : 475 ms    Normal sinus rhythm with sinus arrhythmia  Normal ECG  When compared with ECG of 31-AUG-2023 17:03,  Nonspecific T wave abnormality no longer evident in Lateral leads  Confirmed by Steve Peña MD ([id number]) on 5/6/2024 1:37:38 PM    Referred By:             Confirmed By:Steve Peña MD                                  Imaging Results              CT Head Without Contrast (Final result)  Result time 05/06/24 14:01:07      Final result by Kieran Thompson MD (05/06/24 14:01:07)                   Impression:      No acute intracranial abnormality.      Electronically signed by: Kieran Thompson MD  Date:    05/06/2024  Time:    14:01               Narrative:    EXAMINATION:  CT HEAD WITHOUT CONTRAST    CLINICAL HISTORY:  Neuro deficit, acute, stroke suspected;    TECHNIQUE:  Axial images of the head were obtained without IV contrast administration.  Coronal and sagittal reconstructions were provided.  Three dimensional and MIP images were obtained and evaluated.  Total DLP was 989 mGy-cm. Dose lowering technique and automated exposure control were utilized for this exam.    COMPARISON:  None    FINDINGS:  There is normal brain formation.  There is normal gray-white matter differentiation.  There is no hemorrhage, hydrocephalus, or midline shift.  There is no cytotoxic or vasogenic edema.  There is no intra or extra-axial fluid collection.  There is no herniation.    The calvarium is intact.  There is no fracture.  The bilateral orbits are normal.  The paranasal sinuses and mastoid air cells are normally developed and free  disease.                                       X-Ray Chest 1 View (Final result)  Result time 05/06/24 11:45:17      Final result by Aftab Heck MD (05/06/24 11:45:17)                   Impression:      No acute chest disease is identified.      Electronically signed by: Aftab Heck  Date:    05/06/2024  Time:    11:45               Narrative:    EXAMINATION:  XR CHEST 1 VIEW    CLINICAL HISTORY:  , Chest pain, unspecified.    FINDINGS:  No alveolar consolidation, effusion, or pneumothorax is seen.   The thoracic aorta is normal  cardiac silhouette, central pulmonary vessels and mediastinum are normal in size and are grossly unremarkable.   visualized osseous structures are grossly unremarkable.                                       Medications   sodium chloride 0.9% flush 10 mL (has no administration in time range)   melatonin tablet 6 mg (has no administration in time range)   acetaminophen tablet 650 mg (has no administration in time range)   morphine injection 2 mg (has no administration in time range)   ondansetron injection 4 mg (has no administration in time range)   sodium chloride 0.9% bolus 1,000 mL 1,000 mL (0 mLs Intravenous Stopped 5/6/24 1432)   morphine injection 4 mg (4 mg Intravenous Given 5/6/24 1332)   ondansetron injection 4 mg (4 mg Intravenous Given 5/6/24 1332)   aspirin EC tablet 325 mg (325 mg Oral Given 5/6/24 1338)   nitroGLYCERIN 2% TD oint ointment 1 inch (1 inch Transdermal Given 5/6/24 1424)     Medical Decision Making  Differential diagnosis: TIA vs CVA, ACS, anxiety, viral syndrome, PE, pneumonia, UTI, gastroenteritis, FARRUKH, dehydration, neuropathy     48-year-old female with history of CLL currently on twice a day oral chemo, diabetes, GERD, and hypertension presents to the ED with complaints of intermittent substernal chest pain, SOB, and palpitations that began Friday with associated right arm tingling. States she thought she was having a heart attack so she took one  "of her 's nitro pills however got no relief. States that she feels like she has been dragging the right leg over the weekend and has some blurred vision as well. Patient is also c/o n/v/d. Denies fever, chills, abdominal pain, leg pain or swelling, dizziness. No known sick contacts. PCP is Dr Yusuf and cardiologist is Dr Vann; oncologist is at [facility]       Amount and/or Complexity of Data Reviewed  External Data Reviewed: notes.  Labs: ordered.  Radiology: ordered.  ECG/medicine tests: ordered.  Discussion of management or test interpretation with external provider(s): I spoke with Dr. Shaver regarding the care of this patient. She personally saw the patient face-to-face and agrees with the plan moving forward.         Risk  OTC drugs.  Prescription drug management.               ED Course as of 05/06/24 1549   Mon May 06, 2024   1421 Per RN re-evaluation, patient states her nausea and headache have gotten better however no relief with chest pain. RN states the patient was utilizing her right hand when talking even though patient was just saying she could not move her arm.  [MJ]   1445 Patient states her chest pain has improved to 4/10 compared to 8/10 before. Repeat troponin pending  [MJ]   1447 Dr Yusuf paged [MJ]   1498 Spoke to Dr Yusuf; agrees with admission for ACS r/o with serial enzymes as well as stat MRI brain with neuro consult.  [MJ]      ED Course User Index  [MJ] Bakari Brasher PA-C            BP (!) 144/94   Pulse (!) 58   Temp 98.5 °F (36.9 °C) (Oral)   Resp 15   Ht 5' 9" (1.753 m)   Wt 131.5 kg (290 lb)   SpO2 100%   BMI 42.83 kg/m²                    Clinical Impression:  Final diagnoses:  [G45.9] TIA (transient ischemic attack) (Primary)  [R20.0, R20.2] Numbness and tingling of right arm  [I20.0] Unstable angina          ED Disposition Condition    Admit Stable                Bakari Brasher PA-C  05/06/24 1550    "

## 2024-05-06 NOTE — FIRST PROVIDER EVALUATION
"Medical screening examination initiated.  I have conducted a focused provider triage encounter, findings are as follows:    Brief history of present illness:  48-year-old female with a history of leukemia, hypertension and diabetes presents to ED for evaluation of intermittent midsternal chest pain with shortness of breath since last night.  Reports some right-sided arm tingling.    Vitals:    05/06/24 1122   BP: (!) 164/89   Pulse: 66   Resp: 20   Temp: 98.5 °F (36.9 °C)   TempSrc: Oral   SpO2: 97%   Weight: 131.5 kg (290 lb)   Height: 5' 9" (1.753 m)       Pertinent physical exam:  Patient is awake and alert and oriented.  Ambulatory to triage.  In no acute distress.      Brief workup plan:  labs, EKG, CXR    Preliminary workup initiated; this workup will be continued and followed by the physician or advanced practice provider that is assigned to the patient when roomed.  "

## 2024-05-06 NOTE — CONSULTS
[facility] - Emergency Dept  Neurology  Consult Note    Patient Name: Cristina Lazar  MRN: [id number]  Admission Date: 5/6/2024  Hospital Length of Stay: 0 days  Code Status: Full Code   Attending Provider: aRj Yusuf MD   Consulting Provider: Tanya Mancini NP  Primary Care Physician: Raj Yusuf MD  Principal Problem:<principal problem not specified>    Inpatient consult to neurology  Consult performed by: Tanya Mancini NP  Consult ordered by: Sherri العراقي FNP         Subjective:     Chief Complaint:    Chief Complaint   Patient presents with    Chest Pain     Intermittent midsternal CP starting last night, SOB. also reports R side tingling/numbness starting this morning. Hx of leukemia, HTN, DM          HPI:   Cristina Lazar is a 48 year-old female with past medical history of CLL ( sherin on oral chemo), DM, HTN, JIMBO, and Thyroid Disease who presented to [facility] on 5/6/2024 with complaints of CP, SOB, and Palpitations that began on Friday. She also endorsed right arm and right leg numbness, tingling and heaviness. She described it as if she were dragging her right leg. CT head negative for acute abnormality. She is afebrile and hemodynamically stable. EKG showed NSR with sinus arrhythmia. Troponins <0.010. WBC 29.38, Lymphocytes 20.566. UA unremarkable. Vascular Neurology consulted for possible TIA verus CVA.      Past Medical History:   Diagnosis Date    Arthritis     CLL (chronic lymphocytic leukemia)     Diabetes mellitus     Hypertension     Migraines     Sleep apnea     cpap    Thyroid disease     states recently instructed she could stop thyroid med.       Past Surgical History:   Procedure Laterality Date    BIOPSY OF AXILLARY NODE Left 8/18/2023    Procedure: BIOPSY, LYMPH NODE, AXILLARY;  Surgeon: Sean Styles MD;  Location: [facility] OR;  Service: General;  Laterality: Left;    CARPAL TUNNEL RELEASE Right     CHOLECYSTECTOMY      HYSTERECTOMY      KNEE LIGAMENT  RECONSTRUCTION Right     LYMPH NODE BIOPSY Left 8/18/2023    Procedure: BIOPSY, LYMPH NODE Left  Axilla 2cm;  Surgeon: Sean Styles MD;  Location: [facility];  Service: General;  Laterality: Left;    DIANA-EN-Y PROCEDURE      rt foot Right     for neuropathy    TONSILLECTOMY AND ADENOIDECTOMY      TUBAL LIGATION         Review of patient's allergies indicates:  No Known Allergies    Current Neurological Medications:     Current Facility-Administered Medications   Medication Dose Route Frequency Provider Last Rate Last Admin    acetaminophen tablet 650 mg  650 mg Oral Q8H PRN Bakari Brasher PA-C        melatonin tablet 6 mg  6 mg Oral Nightly PRN Bakari Brasehr PA-C        morphine injection 2 mg  2 mg Intravenous Q4H PRN Bakari Brasher PA-C        ondansetron injection 4 mg  4 mg Intravenous Q8H PRN Bakari Brasher PA-C   4 mg at 05/06/24 1555    sodium chloride 0.9% flush 10 mL  10 mL Intravenous PRN Bakari Brasher PA-C         Current Outpatient Medications   Medication Sig Dispense Refill    albuterol (PROAIR HFA) 90 mcg/actuation inhaler Inhale 2 puffs into the lungs every 6 (six) hours as needed for Wheezing. Rescue 8 g 0    amoxicillin-clavulanate 875-125mg (AUGMENTIN) 875-125 mg per tablet Take 1 tablet by mouth 2 (two) times daily. 14 tablet 0    carvediloL (COREG) 25 MG tablet Take 1 tablet (25 mg total) by mouth 2 (two) times daily. 60 tablet 11    cloNIDine (CATAPRES) 0.1 MG tablet Take 1 tablet (0.1 mg total) by mouth every 4 (four) hours as needed (BP over 160). 30 tablet 0    cloNIDine 0.2 mg/24 hr td ptwk (CATAPRES) 0.2 mg/24 hr 1 patch every 7 days.      ergocalciferol (ERGOCALCIFEROL) 50,000 unit Cap Take 1 capsule (50,000 Units total) by mouth every 7 days. 12 capsule 0    folic acid (FOLVITE) 1 MG tablet Take 1 tablet (1 mg total) by mouth once daily. 90 tablet 3    hydrALAZINE (APRESOLINE) 100 MG tablet Take 150 mg by mouth 2 (two) times daily.      HYDROcodone-acetaminophen  (NORCO) 7.5-325 mg per tablet Take 1 tablet by mouth every 6 (six) hours as needed for Pain. 10 tablet 0    levothyroxine (SYNTHROID) 75 MCG tablet Take 1 tablet (75 mcg total) by mouth before breakfast. 30 tablet 11    metFORMIN (GLUCOPHAGE) 1000 MG tablet Take 1,000 mg by mouth once daily.      PREMARIN vaginal cream Place vaginally.      sucralfate (CARAFATE) 1 gram tablet Take 1 tablet (1 g total) by mouth 4 (four) times daily before meals and nightly. 60 tablet 0    topiramate (TOPAMAX) 200 MG Tab Take 1 tablet (200 mg total) by mouth 2 (two) times daily. 60 tablet 11     Family History       Problem Relation (Age of Onset)    Diabetes Mother    Heart failure Father          Tobacco Use    Smoking status: Never    Smokeless tobacco: Never   Substance and Sexual Activity    Alcohol use: Yes     Comment: wine socially    Drug use: Never    Sexual activity: Yes     Review of Systems   Constitutional:  Positive for activity change and fatigue.   HENT:  Positive for nosebleeds.    Eyes:  Positive for visual disturbance (blurry vision).   Cardiovascular:  Positive for chest pain and palpitations.   Neurological:  Positive for weakness (right leg and arm) and headaches.     Objective:     Vital Signs (Most Recent):  Temp: 98.5 °F (36.9 °C) (05/06/24 1122)  Pulse: 62 (05/06/24 1619)  Resp: 17 (05/06/24 1619)  BP: (!) 130/95 (05/06/24 1502)  SpO2: 100 % (05/06/24 1619) Vital Signs (24h Range):  Temp:  [98.5 °F (36.9 °C)] 98.5 °F (36.9 °C)  Pulse:  [56-66] 62  Resp:  [14-20] 17  SpO2:  [97 %-100 %] 100 %  BP: (128-164)/(89-95) 130/95     Weight: 131.5 kg (290 lb)  Body mass index is 42.83 kg/m².     Physical Exam  Vitals and nursing note reviewed.   Constitutional:       Appearance: She is obese.   HENT:      Head: Normocephalic and atraumatic.      Nose: Nose normal.      Mouth/Throat:      Mouth: Mucous membranes are moist.   Eyes:      Extraocular Movements: Extraocular movements intact.      Pupils: Pupils are equal,  round, and reactive to light.   Cardiovascular:      Rate and Rhythm: Regular rhythm.   Abdominal:      Palpations: Abdomen is soft.   Musculoskeletal:         General: Normal range of motion.      Cervical back: Normal range of motion and neck supple.   Skin:     General: Skin is warm and dry.      Capillary Refill: Capillary refill takes less than 2 seconds.   Neurological:      Mental Status: She is alert and oriented to person, place, and time.      Sensory: Sensory deficit (right arm and right leg) present.      Motor: Weakness (right hand with mild pronator drift. Right dorsiflexion and plantar flexion) present.          NEUROLOGICAL EXAMINATION:     MENTAL STATUS   Oriented to person, place, and time.     CRANIAL NERVES     CN III, IV, VI   Pupils are equal, round, and reactive to light.      Significant Labs: CBC:   Recent Labs   Lab 05/06/24  1144   WBC 29.38  29.38*   HGB 12.4   HCT 38.6        CMP:   Recent Labs   Lab 05/06/24  1144      K 3.7   CO2 24   BUN 11.8   CREATININE 0.72   CALCIUM 9.3   ALBUMIN 3.8   BILITOT 0.8   ALKPHOS 83   AST 12   ALT 12       Significant Imaging: I have reviewed all pertinent imaging results/findings within the past 24 hours.  Assessment and Plan:     TIA (transient ischemic attack)  Stroke   - presented with right hand   - Stroke RF: HTN, HLD, ? Hypercoaguable state  - Intervention: OOW for tnk and no indication   - Etiology: TBD    Stroke workup:  -CTh: negative    -CTA h/n:    -MRI brain:    -ECHO:    -CUS:   -LDL:    -A1c:    -TSH:    -home medications include:    Plan:    - r/o stroke    Admit for stroke workup  Allow permissive HTN ... prn hydralazine and labetalol for SBP > 220 or DBP > 120     - after 24 hours from symptom onset, ok to normalize blood pressure  Neuro checks q4hr ... stat CTh if any neuro change   Begin ASA 325mg daily .... if failed Shipley, then ASA 300mg W[place] daily  DVT ppx with SCD or lovenox 40 s/c daily  Continuous telemetry  monitoring  Bedrest and HOB flat for 24 hours  NPO until passes Shipley or cleared by SLP  PT/OT/SLP to evaluate after 24 hour bedrest completed (from symptom onset)                 VTE Risk Mitigation (From admission, onward)           Ordered     IP VTE HIGH RISK PATIENT  Once         05/06/24 1508     Place sequential compression device  Until discontinued         05/06/24 1508                    Thank you for your consult.  Will follow up with patient.    Tanya Mancini NP  Neurology  [facility] - Emergency Dept

## 2024-05-06 NOTE — ASSESSMENT & PLAN NOTE
Stroke   - presented with right hand   - Stroke RF: HTN, HLD, ? Hypercoaguable state  - Intervention: OOW for tnk and no indication   - Etiology: TBD    Stroke workup:  -CTh: negative    -CTA h/n:    -MRI brain:    -ECHO:    -CUS:   -LDL:    -A1c:    -TSH:    -home medications include:    Plan:    - r/o stroke    Admit for stroke workup  Allow permissive HTN ... prn hydralazine and labetalol for SBP > 220 or DBP > 120     - after 24 hours from symptom onset, ok to normalize blood pressure  Neuro checks q4hr ... stat CTh if any neuro change   Begin ASA 325mg daily .... if failed Shipley, then ASA 300mg PA daily  DVT ppx with SCD or lovenox 40 s/c daily  Continuous telemetry monitoring  Bedrest and HOB flat for 24 hours  NPO until passes Shipley or cleared by SLP  PT/OT/SLP to evaluate after 24 hour bedrest completed (from symptom onset)

## 2024-05-06 NOTE — HPI
Cristina Lazar is a 48 year-old female with past medical history of CLL ( sherin on oral chemo), DM, HTN, JIMBO, and Thyroid Disease who presented to [facility] on 5/6/2024 with complaints of CP, SOB, and Palpitations that began on Friday. She also endorsed right arm and right leg numbness, tingling and heaviness. She described it as if she were dragging her right leg. Patient reports RUE numbness and heaviness resolved, however, continues to have RLE numbness. Pt reports RLE weakness has improved and the RLE discomfort she is having now is d/t her chronic neuropathy for which she has been on gabapentin for.     CT head negative for acute abnormality. CTA head/neck unrevealing for LVO, flow limiting stenosis, or aneurysms. MRI brain w/o unrevealing for acute infarct or any other intracranial abnormalities.    Patient endorsing right-sided neck pain that radiates to R occipital region and right eye.  Patient currently on Qulipta and Calquence. Pt reports headache managed per her PCP for which he alternates Qulipta, topiramate, and ubrelvy for maintenance medications. Pt reports at least 3 migraine days per week where she has to sit in a dark room for hours and sometimes associated with n/v. Pt reports headache is eventually resolved with Fioricet.

## 2024-05-07 PROCEDURE — 25000003 PHARM REV CODE 250: Performed by: PHYSICIAN ASSISTANT

## 2024-05-07 PROCEDURE — 21400001 HC TELEMETRY ROOM

## 2024-05-07 PROCEDURE — 97162 PT EVAL MOD COMPLEX 30 MIN: CPT

## 2024-05-07 PROCEDURE — 63600175 PHARM REV CODE 636 W HCPCS: Performed by: PHYSICIAN ASSISTANT

## 2024-05-07 PROCEDURE — 25000003 PHARM REV CODE 250

## 2024-05-07 PROCEDURE — 25000003 PHARM REV CODE 250: Performed by: INTERNAL MEDICINE

## 2024-05-07 PROCEDURE — 97166 OT EVAL MOD COMPLEX 45 MIN: CPT

## 2024-05-07 PROCEDURE — 92523 SPEECH SOUND LANG COMPREHEN: CPT

## 2024-05-07 RX ORDER — BUTALBITAL, ACETAMINOPHEN AND CAFFEINE 50; 325; 40 MG/1; MG/1; MG/1
1 TABLET ORAL EVERY 6 HOURS PRN
Status: DISCONTINUED | OUTPATIENT
Start: 2024-05-07 | End: 2024-05-10 | Stop reason: HOSPADM

## 2024-05-07 RX ADMIN — ACETAMINOPHEN 650 MG: 325 TABLET, FILM COATED ORAL at 01:05

## 2024-05-07 RX ADMIN — Medication 6 MG: at 02:05

## 2024-05-07 RX ADMIN — ASPIRIN 81 MG: 81 TABLET, COATED ORAL at 09:05

## 2024-05-07 RX ADMIN — BUTALBITAL, ACETAMINOPHEN, AND CAFFEINE 1 TABLET: 50; 325; 40 TABLET ORAL at 09:05

## 2024-05-07 RX ADMIN — ATORVASTATIN CALCIUM 40 MG: 40 TABLET, FILM COATED ORAL at 09:05

## 2024-05-07 RX ADMIN — ACETAMINOPHEN 650 MG: 325 TABLET, FILM COATED ORAL at 02:05

## 2024-05-07 RX ADMIN — ONDANSETRON 4 MG: 2 INJECTION INTRAMUSCULAR; INTRAVENOUS at 01:05

## 2024-05-07 NOTE — PLAN OF CARE
Problem: Occupational Therapy  Goal: Occupational Therapy Goal  Description: Goals to be met by 6/7/2024    Pt will complete feeding with modified independence.  Pt will complete grooming standing at sink with LRAD with modified independence.  Pt will complete UB dressing with modified independence.  Pt will complete LB dressing with modified independence using LRAD.  Pt will complete toileting with modified independence using LRAD.  Pt will complete functional mobility to/from toilet and toilet transfer with modified independence using LRAD.  Pt will demo increased strength in R UE to 5/5 MMT for increased functional use during ADL tasks.    Outcome: Progressing

## 2024-05-07 NOTE — PT/OT/SLP EVAL
Physical Therapy Evaluation    Patient Name:  Cristina Lazar   MRN:  [id number]    Recommendations:     Discharge therapy intensity: High Intensity Therapy   Discharge Equipment Recommendations: none   Barriers to discharge: Impaired mobility and Ongoing medical needs    Assessment:     Cristina Lazar is a 48 y.o. female admitted with a medical diagnosis of TIA. Patient undergoing oral chemo for CLL. Prior to admit, patient lived with spouse in a SLH with 5 steps (no rails) to enter. At baseline, she is independent and ambulates with a QC.  She presents with the following impairments/functional limitations: weakness, impaired endurance, impaired self care skills, impaired functional mobility, gait instability, impaired balance, decreased lower extremity function, decreased safety awareness, decreased upper extremity function, pain, decreased coordination. Patient with severe weakness on right side. She required MIN A for bed mobility. MAX A of 2 for sit<>stand. It took multiple attempts before she was able to come to a full stand. Too weak to ambulate. At this time, patient is not strong enough to return home. Recommending high intensity therapy at discharge.     Rehab Prognosis: Good; patient would benefit from acute skilled PT services to address these deficits and reach maximum level of function.    Recent Surgery: * No surgery found *      Plan:     During this hospitalization, patient would benefit from acute PT services 5 x/week to address the identified rehab impairments via gait training, therapeutic activities, therapeutic exercises, neuromuscular re-education and progress toward the following goals:    Plan of Care Expires:  06/07/24    Subjective     Chief Complaint: pain  Patient/Family Comments/goals: none  Pain/Comfort:  Pain Rating 1: 5/10  Location - Side 1: Left  Location 1: leg  Pain Addressed 1: Reposition, Distraction  Pain Rating Post-Intervention 1: 5/10    Patients cultural, spiritual,  Pentecostal conflicts given the current situation: no    Living Environment:  Prior to admit, patient lived with spouse in a SLH with 5 steps (no rails) to enter.   Prior to admission, patients level of function was independent.  Equipment used at home: cane, straight, walker, rolling.  DME owned (not currently used): none.  Upon discharge, patient will have assistance from TBD.    Objective:     Communicated with nurse prior to session.  Patient found supine with telemetry  upon PT entry to room.    General Precautions: Standard, fall  Orthopedic Precautions:N/A   Braces: N/A  Respiratory Status: Room air    Exams:  Cognitive Exam:  Patient is oriented to Person, Place, and Situation  Sensation: -       Intact  RLE ROM: WFL  RLE Strength: -2/5 grossly  LLE ROM: WFL  LLE Strength: -2/5 grossly  Skin integrity: Visible skin intact      Functional Mobility:  Bed Mobility:     Supine to Sit: minimum assistance  Sit to Supine: minimum assistance  Transfers:  Sit to Stand:  maximal assistance and of 2 persons with rolling walker  Balance: poor      AM-PAC 6 CLICK MOBILITY  Total Score:9     Education Provided:  Role and goals of PT, transfer training, bed mobility, gait training, balance training, safety awareness, assistive device, strengthening exercises, and importance of participating in PT to return to PLOF.    Patient left sitting edge of bed with all lines intact, call button in reach, and spouse present.    GOALS:   Multidisciplinary Problems       Physical Therapy Goals          Problem: Physical Therapy    Goal Priority Disciplines Outcome Goal Variances Interventions   Physical Therapy Goal     PT, PT/OT Progressing     Description: Goals to be met by: 24     Patient will increase functional independence with mobility by performin. Supine to sit with Set-up [place]  2. Sit to supine with Set-up [place]  3. Sit to stand transfer with Stand-by Assistance  4. Bed to chair transfer with  Stand-by Assistance using Rolling Walker  5. Gait  x 200 feet with Stand-by Assistance using Rolling Walker.   6. Ascend/descend 5 stairs with no Handrails & Stand-by Assistance                        History:     Past Medical History:   Diagnosis Date    Arthritis     CLL (chronic lymphocytic leukemia)     Diabetes mellitus     Hypertension     Migraines     Sleep apnea     cpap    Thyroid disease     states recently instructed she could stop thyroid med.       Past Surgical History:   Procedure Laterality Date    BIOPSY OF AXILLARY NODE Left 8/18/2023    Procedure: BIOPSY, LYMPH NODE, AXILLARY;  Surgeon: Sean Styles MD;  Location: [facility];  Service: General;  Laterality: Left;    CARPAL TUNNEL RELEASE Right     CHOLECYSTECTOMY      HYSTERECTOMY      KNEE LIGAMENT RECONSTRUCTION Right     LYMPH NODE BIOPSY Left 8/18/2023    Procedure: BIOPSY, LYMPH NODE Left  Axilla 2cm;  Surgeon: Sean Styles MD;  Location: [facility];  Service: General;  Laterality: Left;    DIANA-EN-Y PROCEDURE      rt foot Right     for neuropathy    TONSILLECTOMY AND ADENOIDECTOMY      TUBAL LIGATION         Time Tracking:     PT Received On: 05/07/24  PT Start Time: 0802     PT Stop Time: 0822  PT Total Time (min): 20 min     Billable Minutes: Evaluation 20 minutes      05/07/2024

## 2024-05-07 NOTE — PLAN OF CARE
05/07/24 1129   Discharge Assessment   Assessment Type Discharge Planning Assessment   Confirmed/corrected address, phone number and insurance Yes   Confirmed Demographics Correct on Facesheet   Source of Information patient;family   Communicated KUMAR with patient/caregiver Date not available/Unable to determine   Reason For Admission CP, Shortness of Breath   People in Home spouse   Do you expect to return to your current living situation? Yes   Do you have help at home or someone to help you manage your care at home? Yes   Walking or Climbing Stairs Difficulty yes   Walking or Climbing Stairs ambulation difficulty, requires equipment   Mobility Management Cane   Dressing/Bathing Difficulty no   Home Accessibility stairs to enter home;stairs within home   Number of Stairs, Within Home, Primary none   Number of Stairs, Main Entrance five   Stair Railings, Main Entrance none   Home Layout Able to live on 1st floor   Equipment Currently Used at Home cane, straight;rollator   Patient currently being followed by outpatient case management? No   Do you currently have service(s) that help you manage your care at home? No   Do you take prescription medications? Yes   Do you have prescription coverage? Yes   Do you have any problems affording any of your prescribed medications? No   Is the patient taking medications as prescribed? yes   Who is going to help you get home at discharge?  Talib   How do you get to doctors appointments? family or friend will provide   Are you on dialysis? No   Do you take coumadin? No   Discharge Plan A Home with family   Discharge Plan B Home with family   Discharge Plan discussed with: Patient;Spouse/sig other   Transition of Care Barriers None   OTHER   Name(s) of People in Home  Talib

## 2024-05-07 NOTE — PT/OT/SLP EVAL
Occupational Therapy  Evaluation    Name: Cristina Lazar  MRN: [id number]  Admitting Diagnosis: chest pain  Recent Surgery: * No surgery found *      Recommendations:     Discharge therapy intensity: High Intensity Therapy   Discharge Equipment Recommendations:  to be determined by next level of care  Barriers to discharge:  Decreased caregiver support (ongoing medical needs;  works full-time)    Assessment:     Cristina Lazar is a 48 y.o. female with a medical diagnosis of R-sided weakenss and numbness.  Currently on oral chemo. Work up has been negative for CVA and TIA. Neuro ordering MRI of C-spine to evaluate for any radiculopathy or myelopathy or mass. She presents with the following performance deficits affecting function: weakness, impaired endurance, impaired self care skills, impaired sensation, impaired functional mobility, decreased coordination, decreased upper extremity function, impaired fine motor. Patient was indep with ADLs prior to admit and is currently requiring max/total A overall. Patient would benefit from high intensity therapy upon discharge. Will continue to document progress.    Rehab Prognosis: Good; patient would benefit from acute skilled OT services to address these deficits and reach maximum level of function.       Plan:     Patient to be seen 5 x/week to address the above listed problems via self-care/home management, therapeutic activities, therapeutic exercises, neuromuscular re-education, sensory integration  Plan of Care Expires: 06/07/24  Plan of Care Reviewed with: patient    Subjective     Chief Complaint: fatigue; RUE weakness  Patient/Family Comments/goals: increase indep with ADL tasks    Occupational Profile:  Living Environment: lives in a single level home with 3 steps to enter without railing; tub combo and walk in shower  Previous level of function: indep with ADLs  Roles and Routines: mother; wife; not working  Equipment Used at Home: cane, quad  Assistance upon  Discharge:  works full time and has a son nearby that does not work    Pain/Comfort:  Pain Rating 1: 0/10    Patients cultural, spiritual, Hindu conflicts given the current situation: no    Objective:     OT communicated with nurse prior to session.      Patient was found HOB elevated with telemetry, pulse ox (continuous), peripheral IV, PureWick upon OT entry to room.    General Precautions: Standard, fall  Orthopedic Precautions: N/A  Braces: N/A    Vital Signs: Respiratory Status: on room air    Activities of Daily Living:  Lower Body Dressing: total assistance    Toileting: total assistance purewick    Functional Cognition:  Affect: Appropriate to situation and Cooperative    Visual Perceptual Skills:  Blurred vision but able to track and read poster on wall    Upper Extremity Function:  Right Upper Extremity:   Patient could not lift the right arm against gravity, but once she used her left arm to raise it, she could hold it against gravity. Reported right shoulder and elbow pain with shoulder and elbow flexion. Patient does have history of ulnar nerve release and CT sx    Left Upper Extremity:  WFL    Therapeutic Positioning  Risk for acquired pressure injuries is increased due to relative decrease in mobility d/t hospitalization  and impaired mobility.    OT interventions performed during the course of today's session:   Education was provided on benefits of and recommendations for therapeutic positioning    Patient Education:  Patient provided with verbal education education regarding OT role/goals/POC, fall prevention, safety awareness, and Discharge/DME recommendations.  Understanding was verbalized.     Patient left HOB elevated with all lines intact, call button in reach, nurse notified, and son present.    GOALS:   Multidisciplinary Problems       Occupational Therapy Goals          Problem: Occupational Therapy    Goal Priority Disciplines Outcome Interventions   Occupational Therapy Goal      OT, PT/OT Progressing    Description: Goals to be met by 6/7/2024    Pt will complete feeding with modified independence.  Pt will complete grooming standing at sink with LRAD with modified independence.  Pt will complete UB dressing with modified independence.  Pt will complete LB dressing with modified independence using LRAD.  Pt will complete toileting with modified independence using LRAD.  Pt will complete functional mobility to/from toilet and toilet transfer with modified independence using LRAD.  Pt will demo increased strength in R UE to 5/5 MMT for increased functional use during ADL tasks.                         History:     Past Medical History:   Diagnosis Date    Arthritis     CLL (chronic lymphocytic leukemia)     Diabetes mellitus     Hypertension     Migraines     Sleep apnea     cpap    Thyroid disease     states recently instructed she could stop thyroid med.         Past Surgical History:   Procedure Laterality Date    BIOPSY OF AXILLARY NODE Left 8/18/2023    Procedure: BIOPSY, LYMPH NODE, AXILLARY;  Surgeon: Sean Styles MD;  Location: [facility];  Service: General;  Laterality: Left;    CARPAL TUNNEL RELEASE Right     CHOLECYSTECTOMY      HYSTERECTOMY      KNEE LIGAMENT RECONSTRUCTION Right     LYMPH NODE BIOPSY Left 8/18/2023    Procedure: BIOPSY, LYMPH NODE Left  Axilla 2cm;  Surgeon: Sean Styles MD;  Location: [facility];  Service: General;  Laterality: Left;    DIANA-EN-Y PROCEDURE      rt foot Right     for neuropathy    TONSILLECTOMY AND ADENOIDECTOMY      TUBAL LIGATION         Time Tracking:     OT Date of Treatment:    OT Start Time: 1450  OT Stop Time: 1505  OT Total Time (min): 15 min    Billable Minutes:Evaluation mod    5/7/2024

## 2024-05-07 NOTE — PLAN OF CARE
Problem: Adult Inpatient Plan of Care  Goal: Plan of Care Review  Outcome: Progressing  Goal: Patient-Specific Goal (Individualized)  Outcome: Progressing  Goal: Absence of Hospital-Acquired Illness or Injury  Outcome: Progressing  Goal: Optimal Comfort and Wellbeing  Outcome: Progressing  Goal: Readiness for Transition of Care  Outcome: Progressing     Problem: Stroke, Ischemic (Includes Transient Ischemic Attack)  Goal: Optimal Coping  Outcome: Progressing  Goal: Effective Bowel Elimination  Outcome: Progressing  Goal: Optimal Cerebral Tissue Perfusion  Outcome: Progressing  Goal: Optimal Cognitive Function  Outcome: Progressing  Goal: Improved Communication Skills  Outcome: Progressing  Goal: Optimal Functional Ability  Outcome: Progressing  Goal: Optimal Nutrition Intake  Outcome: Progressing

## 2024-05-07 NOTE — PLAN OF CARE
Problem: Physical Therapy  Goal: Physical Therapy Goal  Description: Goals to be met by: 24     Patient will increase functional independence with mobility by performin. Supine to sit with Set-up [place]  2. Sit to supine with Set-up [place]  3. Sit to stand transfer with Stand-by Assistance  4. Bed to chair transfer with Stand-by Assistance using Rolling Walker  5. Gait  x 200 feet with Stand-by Assistance using Rolling Walker.   6. Ascend/descend 5 stairs with no Handrails & Stand-by Assistance   Outcome: Progressing

## 2024-05-07 NOTE — NURSING
Nurses Note -- 4 Eyes      5/7/2024   12:25 AM      Skin assessed during: Admit      [x] No Altered Skin Integrity Present    []Prevention Measures Documented      [] Yes- Altered Skin Integrity Present or Discovered   [] LDA Added if Not in Epic (Describe Wound)   [] New Altered Skin Integrity was Present on Admit and Documented in LDA   [] Wound Image Taken    Wound Care Consulted? No    Attending Nurse:  Betina Arreaga RN/Staff Member:   yenni tony

## 2024-05-07 NOTE — PLAN OF CARE
LT. To increase expressive/receptive language skills to enhance functional communication with less than a 10% breakdown.   ST. Answer simple yes/no questions with 90% accuracy.   2. Follow 2-step directions with 90% accuracy.   3. Complete Simple phrases with minimal cueing.   4. Picture description with minimal cueing.

## 2024-05-07 NOTE — CONSULTS
Inpatient consult to Cardiology  Consult performed by: Sweetie Sterling FNP  Consult ordered by: Bakari Brasher PA-C  Reason for consult: CP        [facility]    Cardiology  Consult Note    Patient Name: Cristina Lazar  MRN: [id number]  Admission Date: 5/6/2024  Hospital Length of Stay: 1 days  Code Status: Full Code   Attending Provider: Raj Yusuf MD   Consulting Provider: ERENDIRA Christian  Primary Care Physician: Raj Yusuf MD  Principal Problem:<principal problem not specified>    Patient information was obtained from patient, past medical records, ER records, and primary team.     Subjective:   Chief Complaint/Reason for Consult: Chest Pain    HPI:   Ms. Lazar is a 48 year old female, known to Dr. Vann, who presented to the hospital with intermittent mid sternal CP which started the night prior to hospital arrival. She also reported right-sided tingling and numbness that started the following morning. Noted to have history of CLL, undergoes chemotherapy twice daily. At home, she took her 's Nitro with no relief of her CP. She reported a feeling as if she has been dragging her right leg over the previous weekend and also blurred vision. Neurological team was consulted given her neurological symptoms. Brain MRI revealed chronic age-related changes with no acute adverse findings. CT Angiogram Head/Neck revealed minimal plaque in the right proximal ICA & Mild aneurysmal dilatation of the ascending thoracic aorta. No large vessel occlusion was appreciated.   Troponin values noted to be normal. Echocardiogram on 5.6.24 revealed intact LV Function (EF 60-65%) with mild TR and mild AI. Chest Radiograph noted to be without acute adverse disease. EKG revealed SR with Sinus Arrhythmia and no overt ischemic changes. [facility] is consulted for cardiac evaluation of her CP Symptoms. Of note, according to [facility] Clinic note, patient has longstanding history of chronic  CP that comes and goes.     PMH: Hypertension, DM II, JIMBO/CPAP, MO, Migraines, TAA (4.4 cm in June 2020 on CT & 4.5 cm in May 2023 on CT), Family History of CAD, CP (Intermittent for Years), Chronic Back Pain, CLL, Thyroid Disease  PSH: Right Knee Surgery, Cholecystectomy, Appendectomy, Tonsillectomy, Gastric Bypass, Tubal Ligation, Axillary Node Biopsy  Family History: Father- Diastolic HF/ESRD, Mother- DM II/Cardiac Arrest/Hypertension  Social History: Tobacco- Negative, Alcohol- Occasional Social Use, Substance Abuse- Negative    Previous Cardiac Diagnostics:   Echocardiogram (5.6.24):  Left Ventricle: The left ventricle is normal in size. Mildly increased wall thickness. There is normal systolic function with a visually estimated ejection fraction of 60 - 65%. There is normal diastolic function.  Right Ventricle: Normal right ventricular cavity size. Systolic function is normal. TAPSE is 1.80 cm.  Aortic Valve: There is mild aortic regurgitation.  Tricuspid Valve: There is mild regurgitation    CT Angiogram Head/Neck (5.6.24):  No evidence of large vessel occlusion seen  No evidence of significant stenosis seen.  Minimal plaque in the right proximal internal carotid artery  Mild aneurysmal dilatation of the ascending thoracic aorta.    Echocardiogram (3.17.22):  The study quality is average.   The left ventricle is normal in size. Global left ventricular systolic function is normal. The left ventricular ejection fraction is 60%. Left ventricular diastolic function is normal. Mild concentric left ventricular hypertrophy is present.   Mild (1+) aortic regurgitation.    Calcium Score (6.23.20): Total Score 0    Coronary Angiogram (10.15.13):  No Angiographic Evidence of Obstructive CAD. EF 60%    MPI (10.10.13):  This is an abnormal perfusion study. Study is consistent with ischemia.   This scan is suggestive of moderate risk for future cardiovascular events.  Small reversible perfusion abnormality of mild  intensity in the apical segment. Medium partially reversible perfusion abnormality of moderate intensity in the anterior region.   Perfusion imaging is suggestive of single vessel disease. Perfusion defect is in the distribution of left anterior descending artery.   The left ventricular cavity is noted to be markedly enlarged on the stress study. The left ventricular ejection fraction was calculated to be 51% and left ventricular global function is normal.   The study quality is good.     Review of patient's allergies indicates:  No Known Allergies  No current facility-administered medications on file prior to encounter.     Current Outpatient Medications on File Prior to Encounter   Medication Sig    albuterol (PROAIR HFA) 90 mcg/actuation inhaler Inhale 2 puffs into the lungs every 6 (six) hours as needed for Wheezing. Rescue    amoxicillin-clavulanate 875-125mg (AUGMENTIN) 875-125 mg per tablet Take 1 tablet by mouth 2 (two) times daily.    carvediloL (COREG) 25 MG tablet Take 1 tablet (25 mg total) by mouth 2 (two) times daily.    cloNIDine (CATAPRES) 0.1 MG tablet Take 1 tablet (0.1 mg total) by mouth every 4 (four) hours as needed (BP over 160).    cloNIDine 0.2 mg/24 hr td ptwk (CATAPRES) 0.2 mg/24 hr 1 patch every 7 days.    ergocalciferol (ERGOCALCIFEROL) 50,000 unit Cap Take 1 capsule (50,000 Units total) by mouth every 7 days.    folic acid (FOLVITE) 1 MG tablet Take 1 tablet (1 mg total) by mouth once daily.    hydrALAZINE (APRESOLINE) 100 MG tablet Take 150 mg by mouth 2 (two) times daily.    HYDROcodone-acetaminophen (NORCO) 7.5-325 mg per tablet Take 1 tablet by mouth every 6 (six) hours as needed for Pain.    levothyroxine (SYNTHROID) 75 MCG tablet Take 1 tablet (75 mcg total) by mouth before breakfast.    metFORMIN (GLUCOPHAGE) 1000 MG tablet Take 1,000 mg by mouth once daily.    PREMARIN vaginal cream Place vaginally.    sucralfate (CARAFATE) 1 gram tablet Take 1 tablet (1 g total) by mouth 4  (four) times daily before meals and nightly.    topiramate (TOPAMAX) 200 MG Tab Take 1 tablet (200 mg total) by mouth 2 (two) times daily.     Review of Systems   Respiratory:  Negative for shortness of breath.    Cardiovascular:  Negative for chest pain.   Neurological:         Right Leg Weakness. Unable to Process information. Also reports Aphasia.   All other systems reviewed and are negative.    Objective:     Vital Signs (Most Recent):  Temp: 98 °F (36.7 °C) (05/07/24 0732)  Pulse: (!) 55 (05/07/24 0732)  Resp: 13 (05/06/24 2101)  BP: 128/82 (05/07/24 0732)  SpO2: 96 % (05/07/24 0732) Vital Signs (24h Range):  Temp:  [97.7 °F (36.5 °C)-98.5 °F (36.9 °C)] 98 °F (36.7 °C)  Pulse:  [53-66] 55  Resp:  [13-20] 13  SpO2:  [96 %-100 %] 96 %  BP: (105-164)/(56-95) 128/82   Weight: 131.5 kg (290 lb)  Body mass index is 42.83 kg/m².  SpO2: 96 %       Intake/Output Summary (Last 24 hours) at 5/7/2024 0811  Last data filed at 5/7/2024 0435  Gross per 24 hour   Intake --   Output 500 ml   Net -500 ml     Lines/Drains/Airways       Peripheral Intravenous Line  Duration                  Peripheral IV - Single Lumen 05/06/24 1306 20 G Right Antecubital <1 day                  Significant Labs:  Recent Results (from the past 72 hour(s))   EKG 12-lead    Collection Time: 05/06/24 11:16 AM   Result Value Ref Range    QRS Duration 84 ms    [facility] QTC Calculation 475 ms   Urinalysis, Reflex to Urine Culture    Collection Time: 05/06/24 11:35 AM    Specimen: Urine   Result Value Ref Range    Color, UA Light-Yellow Yellow, Light-Yellow, Colorless, Straw, Dark-Yellow    Appearance, UA Clear Clear    Specific Gravity, UA 1.024 1.005 - 1.030    pH, UA 5.5 5.0 - 8.5    Protein, UA Negative Negative    Glucose, UA Normal Negative, Normal    Ketones, UA Negative Negative    Blood, UA Negative Negative    Bilirubin, UA Negative Negative    Urobilinogen, UA Normal 0.2, 1.0, Normal    Nitrites, UA Negative Negative    Leukocyte Esterase, UA  Negative Negative    WBC, UA 0-5 None Seen, 0-2, 3-5, 0-5 /HPF    Bacteria, UA None Seen None Seen, Trace /HPF    Squamous Epithelial Cells, UA Trace None Seen /HPF    Mucous, UA Trace (A) None Seen /LPF    RBC, UA 0-5 None Seen, 0-2, 3-5, 0-5 /HPF   Comprehensive metabolic panel    Collection Time: 05/06/24 11:44 AM   Result Value Ref Range    Sodium Level 143 136 - 145 mmol/L    Potassium Level 3.7 3.5 - 5.1 mmol/L    Chloride 108 (H) 98 - 107 mmol/L    Carbon Dioxide 24 22 - 29 mmol/L    Glucose Level 93 74 - 100 mg/dL    Blood Urea Nitrogen 11.8 9.8 - 20.1 mg/dL    Creatinine 0.72 0.55 - 1.02 mg/dL    Calcium Level Total 9.3 8.4 - 10.2 mg/dL    Protein Total 7.2 6.4 - 8.3 gm/dL    Albumin Level 3.8 3.5 - 5.0 g/dL    Globulin 3.4 2.4 - 3.5 gm/dL    Albumin/Globulin Ratio 1.1 1.1 - 2.0 ratio    Bilirubin Total 0.8 <=1.5 mg/dL    Alkaline Phosphatase 83 40 - 150 unit/L    Alanine Aminotransferase 12 0 - 55 unit/L    Aspartate Aminotransferase 12 5 - 34 unit/L    eGFR >60 mls/min/1.73/m2   Troponin I    Collection Time: 05/06/24 11:44 AM   Result Value Ref Range    Troponin-I <0.010 0.000 - 0.045 ng/mL   CBC with Differential    Collection Time: 05/06/24 11:44 AM   Result Value Ref Range    WBC 29.38 (H) 4.50 - 11.50 x10(3)/mcL    RBC 4.50 4.20 - 5.40 x10(6)/mcL    Hgb 12.4 12.0 - 16.0 g/dL    Hct 38.6 37.0 - 47.0 %    MCV 85.8 80.0 - 94.0 fL    MCH 27.6 27.0 - 31.0 pg    MCHC 32.1 (L) 33.0 - 36.0 g/dL    RDW 14.0 11.5 - 17.0 %    Platelet 251 130 - 400 x10(3)/mcL    MPV 11.0 (H) 7.4 - 10.4 fL    NRBC% 0.0 %   Manual Differential    Collection Time: 05/06/24 11:44 AM   Result Value Ref Range    WBC 29.38 x10(3)/mcL    Neutrophils % 28 %    Lymphs % 70 %    Monocytes % 1 %    Eosinophils % 1 %    Basophils % 1 %    Neutrophils Abs 8.2264 2.1 - 9.2 x10(3)/mcL    Lymphs Abs 20.566 (H) 0.6 - 4.6 x10(3)/mcL    Monocytes Abs 0.2938 0.1 - 1.3 x10(3)/mcL    Eosinophils Abs 0.2938 0 - 0.9 x10(3)/mcL    Basophils Abs 0.2938  (H) 0 - 0.2 x10(3)/mcL    Platelets Adequate Normal, Adequate    RBC Morph Normal Normal   Troponin I    Collection Time: 05/06/24  2:48 PM   Result Value Ref Range    Troponin-I <0.010 0.000 - 0.045 ng/mL   Echo    Collection Time: 05/06/24  4:36 PM   Result Value Ref Range    BSA 2.53 m2    Alfred's Biplane MOD Ejection Fraction 64 %    LVIDd 3.70 3.5 - 6.0 cm    LV Systolic Volume 22.30 mL    LV Systolic Volume Index 9.2 mL/m2    LVIDs 2.50 2.1 - 4.0 cm    LV Diastolic Volume 58.10 mL    LV Diastolic Volume Index 24.01 mL/m2    IVS 1.40 (A) 0.6 - 1.1 cm    FS 32 28 - 44 %    Left Ventricle Relative Wall Thickness 0.92 cm    Posterior Wall 1.70 (A) 0.6 - 1.1 cm    LV mass 220.06 g    LV Mass Index 91 g/m2    MV Peak E Jared 0.59 m/s    TDI LATERAL 0.07 m/s    TDI SEPTAL 0.07 m/s    E/E' ratio 8.43 m/s    MV Peak A Jared 0.52 m/s    E/A ratio 1.13     E wave deceleration time 246.00 msec    LV SEPTAL E/E' RATIO 8.43 m/s    LV LATERAL E/E' RATIO 8.43 m/s    LVOT peak jared 1.14 m/s    Left Ventricular Outflow Tract Mean Velocity 0.77 cm/s    Left Ventricular Outflow Tract Mean Gradient 3.00 mmHg    TAPSE 1.80 cm    LA size 3.60 cm    LA volume (mod) 59.20 cm3    LA Volume Index (Mod) 24.5 mL/m2    AV mean gradient 5 mmHg    AV peak gradient 10 mmHg    Ao peak jared 1.57 m/s    Ao VTI 32.60 cm    LVOT peak VTI 26.20 cm    AV Velocity Ratio 0.73     AV index (prosthetic) 0.80     PV PEAK VELOCITY 1.09 m/s    PV peak gradient 5 mmHg    Mean e' 0.07 m/s    ZLVIDS -7.95     ZLVIDD -11.29    Troponin I    Collection Time: 05/06/24  7:02 PM   Result Value Ref Range    Troponin-I <0.010 0.000 - 0.045 ng/mL   Lipid panel    Collection Time: 05/06/24  7:02 PM   Result Value Ref Range    Cholesterol Total 174 <=200 mg/dL    HDL Cholesterol 48 35 - 60 mg/dL    Triglyceride 106 37 - 140 mg/dL    Cholesterol/HDL Ratio 4 0 - 5    Very Low Density Lipoprotein 21     LDL Cholesterol 105.00 50.00 - 140.00 mg/dL   TSH    Collection Time:  05/06/24  7:02 PM   Result Value Ref Range    TSH 4.962 (H) 0.350 - 4.940 uIU/mL   Hemoglobin A1C    Collection Time: 05/06/24  7:02 PM   Result Value Ref Range    Hemoglobin A1c 5.1 <=7.0 %    Estimated Average Glucose 99.7 mg/dL   Troponin I    Collection Time: 05/06/24  7:31 PM   Result Value Ref Range    Troponin-I <0.010 0.000 - 0.045 ng/mL   Troponin I    Collection Time: 05/06/24  9:45 PM   Result Value Ref Range    Troponin-I <0.010 0.000 - 0.045 ng/mL     Significant Imaging:  Imaging Results              CTA Head and Neck (xpd) (Final result)  Result time 05/06/24 18:45:46   Procedure changed from CTA Head     Final result by Elvin Benson MD (05/06/24 18:45:46)                   Impression:        No evidence of large vessel occlusion seen    No evidence of significant stenosis seen.  Minimal plaque in the right proximal internal carotid artery    Mild aneurysmal dilatation of the ascending thoracic aorta      Electronically signed by: Reid Benson  Date:    05/06/2024  Time:    18:45               Narrative:    EXAMINATION:  CTA HEAD AND NECK (XPD)    CLINICAL HISTORY:  Stroke, follow up;    TECHNIQUE:  Non contrast low dose axial images were obtained through the head.  CT angiogram was performed from the level of the staci to the top of the head following the IV administration 100 cc of Isovue 370 contrast .  Sagittal and coronal reconstructions and maximum intensity projection reconstructions were performed. Arterial stenosis percentages are based on NASCET measurement criteria.  Additional multiplanar reconstructions were performed on post processed imaging.  Automatic exposure control (AEC) is utilized to reduce patient radiation exposure.    COMPARISON:  None    FINDINGS:  Source images: No intracranial mass lesion is seen.  No hemorrhage is seen.  No infarct is seen.  Ventricles and basilar cisterns appear grossly unremarkable.  No cervical mass or lesion is seen.  No abnormal  lymphadenopathy seen.  Thyroid appears normal.  Larynx appears normal.  Trachea is midline.  Thoracic inlet appears normal.    Vascular images: There is some aneurysmal dilatation of the ascending thoracic aorta.  Maximum dimension visualizes 4.7 cm.  There is a 3 vessel arch seen.  The common carotid arteries are widely patent.  No significant plaque is seen.  The mild amount of calcified plaque is seen at the proximal internal carotid artery on the right side.  No significant plaque is seen in the left carotid bulb or proximal internal carotid artery.  The distal internal carotid arteries are widely patent.  They are seen to level of the petrous ridge and clinoid and supraclinoid portions.    The MCAs are patent.  M1 segments, M2 segments M3 segments are widely patent.  A1 segments are patent.  Anterior communicating arteries patent.  Anterior cerebral arteries are widely patent.  No aneurysm is seen in the anterior circulation.    Both vertebral arteries are widely patent.  They are normal in caliber.  Both vertebral arteries perfuse a normal appearing basilar artery.  No basilar artery aneurysm is seen.  Posterior cerebral arteries widely patent.  No obstruction is seen.  No aneurysm is seen.  Posterior communicating arteries are patent.  No obstruction or aneurysm is seen.    .                                       MRI Brain Without Contrast (Final result)  Result time 05/06/24 17:57:19      Final result by Elvin Benson MD (05/06/24 17:57:19)                   Impression:      Chronic age related changes    Otherwise unremarkable      Electronically signed by: Reid Benson  Date:    05/06/2024  Time:    17:57               Narrative:    EXAMINATION:  MRI BRAIN WITHOUT CONTRAST    CLINICAL HISTORY:  Transient ischemic attack (TIA);    TECHNIQUE:  Multiplanar multisequence MR imaging of the brain was performed without contrast.    COMPARISON:  CT scan dated 05/06/2024    FINDINGS:  No intracranial  mass or lesion is seen.  No hemorrhage is seen.  No acute infarct is seen.  No diffusion abnormality seen.  There is some cerebral atrophy seen and some periventricular and subcortical white matter change a consistent with patient's age.  Posterior fossa appears normal.  Calvarium is intact.  Paranasal sinuses appear grossly unremarkable.                                       CT Head Without Contrast (Final result)  Result time 05/06/24 14:01:07      Final result by Kieran Thompson MD (05/06/24 14:01:07)                   Impression:      No acute intracranial abnormality.      Electronically signed by: Kieran Thompson MD  Date:    05/06/2024  Time:    14:01               Narrative:    EXAMINATION:  CT HEAD WITHOUT CONTRAST    CLINICAL HISTORY:  Neuro deficit, acute, stroke suspected;    TECHNIQUE:  Axial images of the head were obtained without IV contrast administration.  Coronal and sagittal reconstructions were provided.  Three dimensional and MIP images were obtained and evaluated.  Total DLP was 989 mGy-cm. Dose lowering technique and automated exposure control were utilized for this exam.    COMPARISON:  None    FINDINGS:  There is normal brain formation.  There is normal gray-white matter differentiation.  There is no hemorrhage, hydrocephalus, or midline shift.  There is no cytotoxic or vasogenic edema.  There is no intra or extra-axial fluid collection.  There is no herniation.    The calvarium is intact.  There is no fracture.  The bilateral orbits are normal.  The paranasal sinuses and mastoid air cells are normally developed and free disease.                                       X-Ray Chest 1 View (Final result)  Result time 05/06/24 11:45:17      Final result by Aftab Heck MD (05/06/24 11:45:17)                   Impression:      No acute chest disease is identified.      Electronically signed by: Aftab Heck  Date:    05/06/2024  Time:    11:45               Narrative:    EXAMINATION:  XR  CHEST 1 VIEW    CLINICAL HISTORY:  , Chest pain, unspecified.    FINDINGS:  No alveolar consolidation, effusion, or pneumothorax is seen.   The thoracic aorta is normal  cardiac silhouette, central pulmonary vessels and mediastinum are normal in size and are grossly unremarkable.   visualized osseous structures are grossly unremarkable.                                    EKG:       Telemetry:  Sinus Bradycardia    Physical Exam  Vitals and nursing note reviewed.   Constitutional:       General: She is not in acute distress.     Appearance: She is obese.   HENT:      Head: Normocephalic.      Mouth/Throat:      Mouth: Mucous membranes are moist.      Pharynx: Oropharynx is clear.   Cardiovascular:      Rate and Rhythm: Regular rhythm. Bradycardia present.      Heart sounds: Normal heart sounds.   Pulmonary:      Effort: Pulmonary effort is normal. No respiratory distress.      Breath sounds: Normal breath sounds. No wheezing or rales.   Abdominal:      Palpations: Abdomen is soft.   Musculoskeletal:         General: Normal range of motion.      Cervical back: Neck supple.   Skin:     General: Skin is warm and dry.   Neurological:      Mental Status: She is alert and oriented to person, place, and time.      Comments: Expressive/Receptive Aphasia. Right leg weakness.        Home Medications:   No current facility-administered medications on file prior to encounter.     Current Outpatient Medications on File Prior to Encounter   Medication Sig Dispense Refill    albuterol (PROAIR HFA) 90 mcg/actuation inhaler Inhale 2 puffs into the lungs every 6 (six) hours as needed for Wheezing. Rescue 8 g 0    amoxicillin-clavulanate 875-125mg (AUGMENTIN) 875-125 mg per tablet Take 1 tablet by mouth 2 (two) times daily. 14 tablet 0    carvediloL (COREG) 25 MG tablet Take 1 tablet (25 mg total) by mouth 2 (two) times daily. 60 tablet 11    cloNIDine (CATAPRES) 0.1 MG tablet Take 1 tablet (0.1 mg total) by mouth every 4 (four) hours  as needed (BP over 160). 30 tablet 0    cloNIDine 0.2 mg/24 hr td ptwk (CATAPRES) 0.2 mg/24 hr 1 patch every 7 days.      ergocalciferol (ERGOCALCIFEROL) 50,000 unit Cap Take 1 capsule (50,000 Units total) by mouth every 7 days. 12 capsule 0    folic acid (FOLVITE) 1 MG tablet Take 1 tablet (1 mg total) by mouth once daily. 90 tablet 3    hydrALAZINE (APRESOLINE) 100 MG tablet Take 150 mg by mouth 2 (two) times daily.      HYDROcodone-acetaminophen (NORCO) 7.5-325 mg per tablet Take 1 tablet by mouth every 6 (six) hours as needed for Pain. 10 tablet 0    levothyroxine (SYNTHROID) 75 MCG tablet Take 1 tablet (75 mcg total) by mouth before breakfast. 30 tablet 11    metFORMIN (GLUCOPHAGE) 1000 MG tablet Take 1,000 mg by mouth once daily.      PREMARIN vaginal cream Place vaginally.      sucralfate (CARAFATE) 1 gram tablet Take 1 tablet (1 g total) by mouth 4 (four) times daily before meals and nightly. 60 tablet 0    topiramate (TOPAMAX) 200 MG Tab Take 1 tablet (200 mg total) by mouth 2 (two) times daily. 60 tablet 11     Current Inpatient Medications:    Current Facility-Administered Medications:     acetaminophen tablet 650 mg, 650 mg, Oral, Q8H PRN, Bakari Brasher PA-C, 650 mg at 05/07/24 0133    aspirin EC tablet 81 mg, 81 mg, Oral, Daily, Tanya Mancini, NP    atorvastatin tablet 40 mg, 40 mg, Oral, Daily, Tanya Mancini, NP    melatonin tablet 6 mg, 6 mg, Oral, Nightly PRN, Bakari Brasher PA-C, 6 mg at 05/06/24 2017    morphine injection 2 mg, 2 mg, Intravenous, Q4H PRN, Bakari Brasher PA-C, 2 mg at 05/06/24 2012    ondansetron injection 4 mg, 4 mg, Intravenous, Q8H PRN, Bakari Brasher PA-C, 4 mg at 05/07/24 0133    sodium chloride 0.9% flush 10 mL, 10 mL, Intravenous, PRN, Anshu, Bakari J, PA-C    sodium chloride 0.9% flush 10 mL, 10 mL, Intravenous, PRN, Tanya Mancini B, NP  VTE Risk Mitigation (From admission, onward)           Ordered     IP VTE HIGH RISK PATIENT  Once          "05/06/24 1710     Place sequential compression device  Until discontinued         05/06/24 1710     Place sequential compression device  Until discontinued         05/06/24 1508                  Assessment:   Chest Pain (Chronic, Resolved, Do not Suspect Cardiac in Etiology)    - EF 60-65%     - Troponin Values Normal x 4 Sets/EKG: SR with Sinus Arrhythmia with Ischemic Changes    - History of Chronic Intermittent CP "Pressure"    - Calcium Score: 0 (June 2020), Patent Coronaries in 2013  Hypertension  Hyperlipidemia    - On Statin   TIA with Receptive & Expressive Aphasia    - Brain MRI Negative for Acute CVA     - Neurological Team Following   CLL on Chemotherapy     - Leukocytosis  DM II    - A1C 5.1%  JIMBO/CPAP  Elevated BMI/Morbid Obesity  TAA (4.4 cm in June 2020 on CT & 4.5 cm in May 2023 on CT)    - Mild aneurysmal dilatation of the ascending thoracic aorta (CT Angiogram 5.6.24)  CVD    - No evidence of significant stenosis seen.  Minimal plaque in the right proximal internal carotid artery (CT Angiogram Head/Neck)  Hypothyroidism   History of Migraines  Chronic Back Pain  Family History CAD    Plan:   Continue Current Cardiac Medications  Low suspicion chest is cardiac in etiology (Normal Enzymes & 0 Calcium Score in 2020)  Neurological Workup in Progress.  Will need [facility] Follow up on discharge  Call if needed.     ERENDIRA Christian  Cardiology  [facility] - 4th Floor Medical Telemetry  05/07/2024     I agree with the findings of the complexity of problems addressed and take responsibility for the plan's risks and complications. I approved the plan documented by Sweetie Sterling NP.  No further cardiac workup   "

## 2024-05-07 NOTE — H&P
[facility]                       [address] [address]    PATIENT NAME:       TOYA PERRY  YOB: 1975  CSN:                [id number]   MRN:                [id number]  ADMIT DATE:         05/06/2024 11:28:00  PHYSICIAN:          Raj Yusuf MD                        HISTORY AND PHYSICAL      HISTORY OF PRESENT ILLNESS:  This is a 48-year-old  female, well   known to me with multiple medical problems.  She was in her usual state of   health.  She presented with intermittent midsternal chest pain that started last   night.  She had right-sided tingling and numbness that started this morning.    She had been undergoing oral chemo twice a day for CLL.  She denies fever or   chills, but she did have some shortness of breath, as well as what she described   as palpitations that began on Friday, associated with the right arm tingling.    She thought as if she was having a heart attack.  She took her 's   nitroglycerin with no relief.  She feels like she has been dragging the right   leg over the weekend, and has some blurred vision as well.  No abdominal pain.    No nausea, vomiting, or diarrhea or other new problems.    REVIEW OF SYSTEMS:  Times 12, as above.    PAST MEDICAL HISTORY:  Remarkable for hypertension; diabetes mellitus, type 2;   sleep apnea, significant obesity, history of migraines, history of CLL, thyroid   disease.  She has a history of diastolic dysfunction, valvular disease with   mitral regurgitation, tricuspid regurgitation, aortic stenosis, and mitral   regurgitation.  Last echo known, EF of 65% to 70%, in 2017.  She has vitamin D   deficiency, back pain, carpal tunnel, surgical malabsorption, status post   gastric bypass in 2017.    PAST SURGICAL HISTORY:  Tonsillectomy, bariatric surgery, gastric bypass in 2017   , TAHBSO, bone marrow, LESI, ankle surgery in  2019, tarsal tunnel syndrome,   carpal tunnel syndrome release, and ulnar release in the right arm.    FAMILY HISTORY:  Grandparents with heart disease, hypertension, thyroid disease,   and stroke, heart disease as well as diabetes.  Father had diabetes, heart   disease, and stroke and epilepsy.  Mother had diabetes, hypertension, heart   disease.    SOCIAL HISTORY:  The patient and drinks.  She is .  No tobacco.    She had a small cell lymphoma, anxiety, depression, atopic dermatitis, B12   deficiency, vitamin D deficiency.    ALLERGIES:  NO KNOWN DRUG ALLERGIES.     MEDICATIONS:  Mounjaro, Norco, rosuvastatin, folic acid, nifedipine, vitamin D,   Catapres, , modafinil, L methyl folate, spironolactone, Topamax, olmesartan   HCTZ 40/12.5, Coreg 25 b.i.d., hydralazine 100 t.i.d., B complex, trazodone, and   baby aspirin.    PHYSICAL EXAMINATION:  VITAL SIGNS:  Blood pressure 164/89, pulse 66, temperature 98.5.  GENERAL APPEARANCE:  She is alert, in no acute distress.  HEENT:  Normocephalic, atraumatic.  PERRLA.  EOMI.  NECK:  Supple.  There is no JVD, no bruits.  HEART:  She has a regular rhythm and rate.  LUNGS:  Clear.  ABDOMEN:  Soft at present, nontender.  Bowel sounds present.  GENITORECTAL:  No discharge.  Normal for age.  EXTREMITIES:  She had a slight decreased strength to the right upper extremity.    Finger-to-nose is lightly normal in the right, unable to hold right hand.  No   facial droop or slurred speech.    LABS AND STUDIES:  White cell count 29.38, H and H 12.4 and 38.6, platelet count   251.  BUN 11.8, creatinine 0.72, albumin 3.8, cholesterol 174, HDL 48,   triglycerides 106, .  Troponin less than 0.01.  Hemoglobin A1c 5.1.  TSH   4.96.  Urine unremarkable.    CT of the head showed no acute intracranial abnormalities.    CTA showed no large vessel occlusion, minimal plaque, right proximal internal   carotid, and mild aneurysmal dilatation, ascending thoracic aorta.    MRI showed  chronic age-related changes.  No acute strokes.    Echocardiogram showed EF of 60% to 65%, mild aortic regurgitation, mild   tricuspid regurgitation, normal diastolic function, mild tricuspid   regurgitation, trace mitral regurgitation.  Negative bubble study.    IMPRESSION:  Chest pain which is rather typical.  She had sensory deficit in the   right arm, right leg with some right weakness, rule out transient ischemic   attack versus stroke versus others.  She has a history of hypertension; diabetes   mellitus, type 2; dyslipidemia, vitamin D deficiency, surgical malabsorption,   thyroid disease, has history of chronic lymphocytic leukemia.    .    PLAN:  The patient to be evaluated by Neurology.  We will continue with   permissive blood pressure until a clear picture emerged.  We will check sugars.    We will just place her on antiemetics.  She was placed on aspirin and   atorvastatin.  Cardiology evaluation is pending.        ______________________________  MD JOSE Sanchez/ROSALIE  DD:  05/06/2024  Time:  10:35PM  DT:  05/07/2024  Time:  12:19AM  Job #:  [id number]      HISTORY AND PHYSICAL

## 2024-05-08 LAB
T3FREE SERPL-MCNC: 1.86 PG/ML (ref 1.58–3.91)
T4 FREE SERPL-MCNC: 0.73 NG/DL (ref 0.7–1.48)

## 2024-05-08 PROCEDURE — 97110 THERAPEUTIC EXERCISES: CPT

## 2024-05-08 PROCEDURE — 25000003 PHARM REV CODE 250: Performed by: INTERNAL MEDICINE

## 2024-05-08 PROCEDURE — 84439 ASSAY OF FREE THYROXINE: CPT | Performed by: INTERNAL MEDICINE

## 2024-05-08 PROCEDURE — 25000003 PHARM REV CODE 250: Performed by: PHYSICIAN ASSISTANT

## 2024-05-08 PROCEDURE — 97535 SELF CARE MNGMENT TRAINING: CPT

## 2024-05-08 PROCEDURE — 97116 GAIT TRAINING THERAPY: CPT

## 2024-05-08 PROCEDURE — 97530 THERAPEUTIC ACTIVITIES: CPT

## 2024-05-08 PROCEDURE — 25000003 PHARM REV CODE 250

## 2024-05-08 PROCEDURE — 21400001 HC TELEMETRY ROOM

## 2024-05-08 PROCEDURE — 36415 COLL VENOUS BLD VENIPUNCTURE: CPT | Performed by: INTERNAL MEDICINE

## 2024-05-08 PROCEDURE — 84481 FREE ASSAY (FT-3): CPT | Performed by: INTERNAL MEDICINE

## 2024-05-08 PROCEDURE — 63600175 PHARM REV CODE 636 W HCPCS: Mod: JZ,JG | Performed by: PHYSICIAN ASSISTANT

## 2024-05-08 RX ORDER — METHOCARBAMOL 500 MG/1
500 TABLET, FILM COATED ORAL EVERY 4 HOURS PRN
Status: DISCONTINUED | OUTPATIENT
Start: 2024-05-08 | End: 2024-05-10 | Stop reason: HOSPADM

## 2024-05-08 RX ADMIN — MORPHINE SULFATE 2 MG: 4 INJECTION, SOLUTION INTRAMUSCULAR; INTRAVENOUS at 01:05

## 2024-05-08 RX ADMIN — ATORVASTATIN CALCIUM 40 MG: 40 TABLET, FILM COATED ORAL at 08:05

## 2024-05-08 RX ADMIN — ACETAMINOPHEN 650 MG: 325 TABLET, FILM COATED ORAL at 05:05

## 2024-05-08 RX ADMIN — MORPHINE SULFATE 2 MG: 4 INJECTION, SOLUTION INTRAMUSCULAR; INTRAVENOUS at 10:05

## 2024-05-08 RX ADMIN — ONDANSETRON 4 MG: 2 INJECTION INTRAMUSCULAR; INTRAVENOUS at 10:05

## 2024-05-08 RX ADMIN — ONDANSETRON 4 MG: 2 INJECTION INTRAMUSCULAR; INTRAVENOUS at 01:05

## 2024-05-08 RX ADMIN — Medication: at 05:05

## 2024-05-08 RX ADMIN — METHOCARBAMOL 500 MG: 500 TABLET ORAL at 05:05

## 2024-05-08 RX ADMIN — ASPIRIN 81 MG: 81 TABLET, COATED ORAL at 08:05

## 2024-05-08 NOTE — PT/OT/SLP PROGRESS
Physical Therapy Treatment    Patient Name:  Cristina Lazar   MRN:  [id number]    Recommendations:     Discharge therapy intensity: High Intensity Therapy   Discharge Equipment Recommendations: walker, rolling  Barriers to discharge: Impaired mobility    Assessment:     Cristina Lazar is a 48 y.o. female admitted with a medical diagnosis of TIA, on chemo for CLL, R weakness, MRI brain negative.  She presents with the following impairments/functional limitations: weakness, impaired endurance, impaired self care skills, impaired functional mobility, gait instability, impaired balance, decreased lower extremity function, decreased safety awareness, decreased upper extremity function, pain, decreased coordination. Pt able to transfer with Mod A, and ambulate with Min A and Rw today. Pt motivated to return home. Pt c/o dizziness and general unsteadiness. Recommending high intensity, but pt may want to d/c home with HHPT and RW. Pt keeps R knee extended during swing phase of gait, which is atypical. VC to improve knee flexion.     Rehab Prognosis: Good; patient would benefit from acute skilled PT services to address these deficits and reach maximum level of function.    Recent Surgery: * No surgery found *      Plan:     During this hospitalization, patient would benefit from acute PT services 5 x/week to address the identified rehab impairments via gait training, therapeutic activities, therapeutic exercises, neuromuscular re-education and progress toward the following goals:    Plan of Care Expires:  06/07/24    Subjective     Chief Complaint: dizziness after chemo med  Patient/Family Comments/goals: to go home  Pain/Comfort:         Objective:     Communicated with nurse prior to session.  Patient found supine with   upon PT entry to room.     General Precautions: Standard, fall  Orthopedic Precautions: N/A  Braces: N/A  Respiratory Status: Room air      Functional Mobility:  Bed Mobility:     Scooting: contact guard  assistance  Supine to Sit: moderate assistance  Transfers:     Sit to Stand:  moderate assistance with rolling walker. Pt not open to instructions from PT for transfers. She wanted it to do it her way, which included pulling on tech's arm for sit<>stand transfer. Pt states she pulls on her  at home.   Gait: 50ft with RW and Lenora for cueing for increasing R knee swing. Pt tended to keep R knee extended and scooted RLE forward in step. Very slow pace.       Education:  Patient and spouse were provided with verbal education education regarding PT role/goals/POC.  Additional teaching is warranted.     Patient left up in chair with all lines intact, call button in reach, and nruse notified    GOALS:   Multidisciplinary Problems       Physical Therapy Goals          Problem: Physical Therapy    Goal Priority Disciplines Outcome Goal Variances Interventions   Physical Therapy Goal     PT, PT/OT Progressing     Description: Goals to be met by: 24     Patient will increase functional independence with mobility by performin. Supine to sit with Set-up Cedar  2. Sit to supine with Set-up Cedar  3. Sit to stand transfer with Stand-by Assistance  4. Bed to chair transfer with Stand-by Assistance using Rolling Walker  5. Gait  x 200 feet with Stand-by Assistance using Rolling Walker.   6. Ascend/descend 5 stairs with no Handrails & Stand-by Assistance                        Time Tracking:     PT Received On: 24  PT Start Time: 1015     PT Stop Time: 1038  PT Total Time (min): 23 min     Billable Minutes: Gait Training 13 and Therapeutic Activity 10    Treatment Type: Treatment  PT/PTA: PT     Number of PTA visits since last PT visit: 2024

## 2024-05-08 NOTE — PROGRESS NOTES
[facility]                       [address] [address]    PATIENT NAME:       TOYA PERRY  YOB: 1975  CSN:                [id number]   MRN:                [id number]  ADMIT DATE:         05/06/2024 11:28:00  PHYSICIAN:          Raj Yusuf MD                            PROGRESS NOTE    DATE:      SUBJECTIVE:  A 48-year-old  female.  She was admitted yesterday   with some right-sided weakness and some slurred speech.  She had some chest pain   as well and some shortness of breath and what she describes palpitations.  She   is on chemo, acalabrutinib for CLL by Dr. Bettencourt.  She had an MRI that was   negative for an acute CVA.  CTA was unremarkable.  She states that she feels a   little lethargic after receiving her chemo.  Her examination is somewhat   inconsistent with a CVA, but she may still have some other neurological issues   including she had in the past a right ankle fracture as well as a right upper   extremity with carpal tunnel and transposition of the nerve.  As per Cardio, her   troponins were normal.  She had an echo that had mild aortic insufficiency and   tricuspid regurgitation with a normal EF.  She did not have any arrhythmias in   telemetry.  She did not get relieve with nitroglycerin at home.  She did have a   coronary angiogram back about 11 years ago with no evidence of obstruction.  She   had troponins that were remarkable.  Her lipids showed total cholesterol 174,   HDL 48, and .  She only had some sinus arrhythmia in the EKG.  She had    in June 2020.  She had minimum plaque on the right proximal internal carotid   artery, had history of migraine.  She has no other complaints.    REVIEW OF SYSTEMS:  Times 12 as above.    PHYSICAL EXAMINATION:  VITAL SIGNS:  Blood pressure was 128/82, pulse is 55, temp 98.  GENERAL APPEARANCE:  She is alert, in no  acute distress.  HEENT:  Normocephalic, atraumatic.  PERRLA.  EOMI.  No asymmetry.  HEART:  Regular rhythm and rate.  LUNGS:  Clear.  ABDOMEN:  Soft, nontender.  Bowel sounds positive and normal.  EXTREMITIES:  She has some right-sided weakness.  She may have had a little   component of confusion.    IMPRESSION:    1. Acute right-sided weakness with negative MRI for an acute stroke, cannot rule   out internal neurological disorder.  Some nerve impingement in the cervical or   the lumbar spine or more peripherally.  2. She did have some chest pain, but is rather atypical with very low suspicion   for any anginal issues.    PLAN:  We will continue with PT, OT, ST.  We will continue to follow Neuro   recommendations.  They ordered a study of the cervical spine, to continue   therapy, DVT prophylaxis, telemetry monitoring.  Also will follow Cardio   recommendations that include no further workup since it is rather atypical.  We   will continue to follow closely.        ______________________________  MD JOSE Sanchez/ROSALIE  DD:  05/07/2024  Time:  06:54PM  DT:  05/07/2024  Time:  08:56PM  Job #:  [id number]      PROGRESS NOTE

## 2024-05-08 NOTE — PT/OT/SLP PROGRESS
Occupational Therapy   Treatment    Name: Cristina Lazar  MRN: [id number]  Recommendations:     Recommended therapy intensity at discharge: High Intensity Therapy   Discharge Equipment Recommendations:  to be determined by next level of care  Barriers to discharge:  Decreased caregiver support (ongoing medical needs)    Assessment:     Cristina Lazar is a 48 y.o. female with a medical diagnosis of R-sided weakenss and numbness.  Currently on oral chemo. Work up has been negative for CVA and TIA. Neuro ordering MRI of C-spine to evaluate for any radiculopathy or myelopathy or mass. She presents with increased motivation to participate in therapy and continued weakness in RUE. Patient could not lift the right arm against gravity, but once she used her left arm to raise it, she could hold it against gravity. Reported right shoulder and elbow pain with shoulder and elbow flexion. Performance deficits affecting function are weakness, impaired endurance, impaired self care skills, impaired sensation, impaired functional mobility, decreased coordination, decreased upper extremity function, impaired fine motor. Patient would benefit from high intensity therapy at this time due to being indep prior and to decrease caregiver burden.    Rehab Prognosis:  Good; patient would benefit from acute skilled OT services to address these deficits and reach maximum level of function.       Plan:     Patient to be seen 5 x/week to address the above listed problems via self-care/home management, therapeutic activities, therapeutic exercises, neuromuscular re-education, sensory integration  Plan of Care Expires: 06/07/24  Plan of Care Reviewed with: patient    Subjective     Pain/Comfort:  Location - Side 1: Right  Location - Orientation 1: upper  Location 1: arm  Pain Addressed 1: Reposition, Distraction, Cessation of Activity    Objective:     Communicated with: nurse prior to session.  Patient found HOB elevated with telemetry, pulse ox  (continuous), peripheral IV upon OT entry to room.    General Precautions: Standard, fall    Orthopedic Precautions:N/A  Braces: N/A  Respiratory Status: Room air     Occupational Performance:     Activities of Daily Living:  Feeding:  patient with difficulty to open containers with dominant R hand; mostly compensating and using LUE; she was able to bring sprite can and chip to mouth with straw with min A    Therapeutic Exercise:  Patient given foam block for R hand intrinsic strengthening to perform every hour while in bed. She was also educated on performing AAROM exercises to promote RUE strengthening and ROM. Patient was able to return demonstration by performing 10x2 reps/sets    Patient Education:  Patient provided with verbal education education regarding OT role/goals/POC, fall prevention, safety awareness, and Discharge/DME recommendations.  Understanding was verbalized.      Patient left supine with all lines intact, call button in reach, and nurse notified.    GOALS:   Multidisciplinary Problems       Occupational Therapy Goals          Problem: Occupational Therapy    Goal Priority Disciplines Outcome Interventions   Occupational Therapy Goal     OT, PT/OT Progressing    Description: Goals to be met by 6/7/2024    Pt will complete feeding with modified independence.  Pt will complete grooming standing at sink with LRAD with modified independence.  Pt will complete UB dressing with modified independence.  Pt will complete LB dressing with modified independence using LRAD.  Pt will complete toileting with modified independence using LRAD.  Pt will complete functional mobility to/from toilet and toilet transfer with modified independence using LRAD.  Pt will demo increased strength in R UE to 5/5 MMT for increased functional use during ADL tasks.                         Time Tracking:     OT Date of Treatment: 05/08/24  OT Start Time: 1443  OT Stop Time: 1502  OT Total Time (min): 19 min    Billable  Minutes:Self Care/Home Management 9  Therapeutic Exercise 10    OT/MARIA ELENA: OT     Number of MARIA ELENA visits since last OT visit: 1    5/8/2024

## 2024-05-09 PROBLEM — G43.009 MIGRAINE WITHOUT AURA: Status: ACTIVE | Noted: 2024-05-09

## 2024-05-09 PROCEDURE — 97530 THERAPEUTIC ACTIVITIES: CPT

## 2024-05-09 PROCEDURE — 99223 1ST HOSP IP/OBS HIGH 75: CPT | Mod: ,,, | Performed by: PSYCHIATRY & NEUROLOGY

## 2024-05-09 PROCEDURE — 21400001 HC TELEMETRY ROOM

## 2024-05-09 PROCEDURE — 25500020 PHARM REV CODE 255: Performed by: INTERNAL MEDICINE

## 2024-05-09 PROCEDURE — 92507 TX SP LANG VOICE COMM INDIV: CPT

## 2024-05-09 PROCEDURE — 97535 SELF CARE MNGMENT TRAINING: CPT

## 2024-05-09 PROCEDURE — 25000003 PHARM REV CODE 250

## 2024-05-09 PROCEDURE — 97116 GAIT TRAINING THERAPY: CPT

## 2024-05-09 PROCEDURE — 25000003 PHARM REV CODE 250: Performed by: INTERNAL MEDICINE

## 2024-05-09 PROCEDURE — 25000003 PHARM REV CODE 250: Performed by: PHYSICIAN ASSISTANT

## 2024-05-09 PROCEDURE — A9577 INJ MULTIHANCE: HCPCS | Performed by: INTERNAL MEDICINE

## 2024-05-09 RX ORDER — ALBUTEROL SULFATE 90 UG/1
2 AEROSOL, METERED RESPIRATORY (INHALATION) EVERY 6 HOURS PRN
Status: DISCONTINUED | OUTPATIENT
Start: 2024-05-09 | End: 2024-05-10 | Stop reason: HOSPADM

## 2024-05-09 RX ORDER — FOLIC ACID 1 MG/1
1 TABLET ORAL DAILY
Status: DISCONTINUED | OUTPATIENT
Start: 2024-05-09 | End: 2024-05-10 | Stop reason: HOSPADM

## 2024-05-09 RX ORDER — GABAPENTIN 300 MG/1
300 CAPSULE ORAL 3 TIMES DAILY
Status: DISCONTINUED | OUTPATIENT
Start: 2024-05-09 | End: 2024-05-10 | Stop reason: HOSPADM

## 2024-05-09 RX ORDER — ERGOCALCIFEROL 1.25 MG/1
50000 CAPSULE ORAL
Status: DISCONTINUED | OUTPATIENT
Start: 2024-05-09 | End: 2024-05-10 | Stop reason: HOSPADM

## 2024-05-09 RX ORDER — LOSARTAN POTASSIUM 25 MG/1
25 TABLET ORAL 2 TIMES DAILY
Status: DISCONTINUED | OUTPATIENT
Start: 2024-05-09 | End: 2024-05-10 | Stop reason: HOSPADM

## 2024-05-09 RX ORDER — LIOTHYRONINE SODIUM 25 UG/1
50 TABLET ORAL DAILY
Status: DISCONTINUED | OUTPATIENT
Start: 2024-05-09 | End: 2024-05-10 | Stop reason: HOSPADM

## 2024-05-09 RX ADMIN — LOSARTAN POTASSIUM 25 MG: 25 TABLET, FILM COATED ORAL at 08:05

## 2024-05-09 RX ADMIN — ACETAMINOPHEN 650 MG: 325 TABLET, FILM COATED ORAL at 06:05

## 2024-05-09 RX ADMIN — METHOCARBAMOL 500 MG: 500 TABLET ORAL at 06:05

## 2024-05-09 RX ADMIN — GADOBENATE DIMEGLUMINE 20 ML: 529 INJECTION, SOLUTION INTRAVENOUS at 05:05

## 2024-05-09 RX ADMIN — BUTALBITAL, ACETAMINOPHEN, AND CAFFEINE 1 TABLET: 50; 325; 40 TABLET ORAL at 03:05

## 2024-05-09 RX ADMIN — LIOTHYRONINE SODIUM 50 MCG: 25 TABLET ORAL at 10:05

## 2024-05-09 RX ADMIN — METHOCARBAMOL 500 MG: 500 TABLET ORAL at 12:05

## 2024-05-09 RX ADMIN — BUTALBITAL, ACETAMINOPHEN, AND CAFFEINE 1 TABLET: 50; 325; 40 TABLET ORAL at 12:05

## 2024-05-09 RX ADMIN — ERGOCALCIFEROL 50000 UNITS: 1.25 CAPSULE ORAL at 10:05

## 2024-05-09 RX ADMIN — CONJUGATED ESTROGENS 1 G: 0.62 CREAM VAGINAL at 10:05

## 2024-05-09 RX ADMIN — LOSARTAN POTASSIUM 25 MG: 25 TABLET, FILM COATED ORAL at 10:05

## 2024-05-09 RX ADMIN — METHOCARBAMOL 500 MG: 500 TABLET ORAL at 03:05

## 2024-05-09 RX ADMIN — ATORVASTATIN CALCIUM 40 MG: 40 TABLET, FILM COATED ORAL at 09:05

## 2024-05-09 RX ADMIN — FOLIC ACID 1 MG: 1 TABLET ORAL at 10:05

## 2024-05-09 RX ADMIN — ASPIRIN 81 MG: 81 TABLET, COATED ORAL at 09:05

## 2024-05-09 NOTE — PT/OT/SLP PROGRESS
Physical Therapy Treatment    Patient Name:  Cristina Lazar   MRN:  [id number]    Recommendations:     Discharge therapy intensity: High Intensity Therapy   Discharge Equipment Recommendations: walker, rolling  Barriers to discharge: Inaccessible home, Decreased caregiver support, Impaired mobility, and Ongoing medical needs    Assessment:     Cristina Lazar is a 48 y.o. female admitted with a medical diagnosis of R-sided neck pain and RUE weakness. CT head negative for acute abnormality. CTA head/neck unrevealing for LVO, flow limiting stenosis, or aneurysms. MRI brain w/o unrevealing for acute infarct or any other intracranial abnormalities.  She presents with the following impairments/functional limitations: weakness, impaired endurance, impaired self care skills, impaired functional mobility, gait instability, impaired balance, decreased coordination, decreased upper extremity function, decreased lower extremity function, decreased safety awareness, impaired fine motor.     Continuing to recommend high intensity therapy at this time due to pt's inaccessible home, decreased caregiver support, and impaired functional mobility. She reports that she will be home alone most of the day and has 3 steps with no handrail to enter home. Patient educated on the demands of high intensity therapy today with patient seeming open to inpatient rehab, but still appearing to want to d/c home in spite of barriers. Patient is unsafe to return home at this time, and will benefit from continued therapy to increase independence and improve functional mobility prior to returning home.     Rehab Prognosis: Good; patient would benefit from acute skilled PT services to address these deficits and reach maximum level of function.    Recent Surgery: * No surgery found *      Plan:     During this hospitalization, patient would benefit from acute PT services 5 x/week to address the identified rehab impairments via gait training, therapeutic  "activities, therapeutic exercises, neuromuscular re-education and progress toward the following goals:    Plan of Care Expires:  06/07/24    Subjective     Chief Complaint: fatigue  Patient/Family Comments/goals: "I don't want to go to a nursing home"  Pain/Comfort:  Pain Rating 1: 0/10      Objective:     Communicated with RN prior to session.  Patient found HOB elevated with peripheral IV, telemetry, pulse ox (continuous), PureWick upon PT entry to room.     General Precautions: Standard, fall  Orthopedic Precautions: N/A  Braces: N/A  Respiratory Status: Room air      Functional Mobility:  Bed Mobility:     Supine to Sit: stand by assistance  Sit to Supine: stand by assistance  Transfers:     Sit to Stand:  SBA to CGA with rolling walker. Heavy reliance on LUE/LLE. Requesting therapists to give her space during sit<>stand, stating "I can stand. Y'all are making me nervous"  Toilet Transfer: contact guard assistance with  rolling walker and grab bars  using  Step Transfer; (+) void  Chair to bed: contact guard assistance with bed rails using squat pivot transfer  Gait: The pt ambulated ~20 ft with RW and CGA; chair following. Cuing for R knee flexion in swing. Pt continues to ambulate with R knee extended and sliding RLE forward to advance. Pt with some impulsivity, quickly sitting in chair after ambulation due to fatigue.       Education:  Patient and spouse were provided with verbal education education regarding PT role/goals/POC, fall prevention, safety awareness, and discharge/DME recommendations.  Understanding was verbalized, however additional teaching warranted.     Patient left HOB elevated with all lines intact, call button in reach, RN notified, and  present    GOALS:   Multidisciplinary Problems       Physical Therapy Goals          Problem: Physical Therapy    Goal Priority Disciplines Outcome Goal Variances Interventions   Physical Therapy Goal     PT, PT/OT Progressing     Description: Goals " to be met by: 24     Patient will increase functional independence with mobility by performin. Supine to sit with Set-up Scott  2. Sit to supine with Set-up Scott  3. Sit to stand transfer with Stand-by Assistance  4. Bed to chair transfer with Stand-by Assistance using Rolling Walker  5. Gait  x 200 feet with Stand-by Assistance using Rolling Walker.   6. Ascend/descend 5 stairs with no Handrails & Stand-by Assistance                        Time Tracking:     PT Received On:    PT Start Time: 1405     PT Stop Time: 1450  PT Total Time (min): 45 min     Billable Minutes: Gait Training 20 and Therapeutic Activity 25    Treatment Type: Treatment  PT/PTA: PT     Number of PTA visits since last PT visit: 2     2024

## 2024-05-09 NOTE — SUBJECTIVE & OBJECTIVE
Past Medical History:   Diagnosis Date    Arthritis     CLL (chronic lymphocytic leukemia)     Diabetes mellitus     Hypertension     Migraines     Sleep apnea     cpap    Thyroid disease     states recently instructed she could stop thyroid med.       Past Surgical History:   Procedure Laterality Date    BIOPSY OF AXILLARY NODE Left 8/18/2023    Procedure: BIOPSY, LYMPH NODE, AXILLARY;  Surgeon: Sean Styles MD;  Location: [facility];  Service: General;  Laterality: Left;    CARPAL TUNNEL RELEASE Right     CHOLECYSTECTOMY      HYSTERECTOMY      KNEE LIGAMENT RECONSTRUCTION Right     LYMPH NODE BIOPSY Left 8/18/2023    Procedure: BIOPSY, LYMPH NODE Left  Axilla 2cm;  Surgeon: Sean Styles MD;  Location: [facility] OR;  Service: General;  Laterality: Left;    DIANA-EN-Y PROCEDURE      rt foot Right     for neuropathy    TONSILLECTOMY AND ADENOIDECTOMY      TUBAL LIGATION         Review of patient's allergies indicates:  No Known Allergies    Current Neurological Medications:     No current facility-administered medications on file prior to encounter.     Current Outpatient Medications on File Prior to Encounter   Medication Sig    albuterol (PROAIR HFA) 90 mcg/actuation inhaler Inhale 2 puffs into the lungs every 6 (six) hours as needed for Wheezing. Rescue    amoxicillin-clavulanate 875-125mg (AUGMENTIN) 875-125 mg per tablet Take 1 tablet by mouth 2 (two) times daily.    carvediloL (COREG) 25 MG tablet Take 1 tablet (25 mg total) by mouth 2 (two) times daily.    cloNIDine (CATAPRES) 0.1 MG tablet Take 1 tablet (0.1 mg total) by mouth every 4 (four) hours as needed (BP over 160).    cloNIDine 0.2 mg/24 hr td ptwk (CATAPRES) 0.2 mg/24 hr 1 patch every 7 days.    ergocalciferol (ERGOCALCIFEROL) 50,000 unit Cap Take 1 capsule (50,000 Units total) by mouth every 7 days.    folic acid (FOLVITE) 1 MG tablet Take 1 tablet (1 mg total) by mouth once daily.    hydrALAZINE (APRESOLINE) 100 MG tablet Take 150 mg by mouth 2  (two) times daily.    HYDROcodone-acetaminophen (NORCO) 7.5-325 mg per tablet Take 1 tablet by mouth every 6 (six) hours as needed for Pain.    levothyroxine (SYNTHROID) 75 MCG tablet Take 1 tablet (75 mcg total) by mouth before breakfast.    metFORMIN (GLUCOPHAGE) 1000 MG tablet Take 1,000 mg by mouth once daily.    PREMARIN vaginal cream Place vaginally.    sucralfate (CARAFATE) 1 gram tablet Take 1 tablet (1 g total) by mouth 4 (four) times daily before meals and nightly.    topiramate (TOPAMAX) 200 MG Tab Take 1 tablet (200 mg total) by mouth 2 (two) times daily.     Family History       Problem Relation (Age of Onset)    Diabetes Mother    Heart failure Father          Tobacco Use    Smoking status: Never    Smokeless tobacco: Never   Substance and Sexual Activity    Alcohol use: Yes     Comment: wine socially    Drug use: Never    Sexual activity: Yes     Review of Systems  A 14pt ros was reviewed & is negative unless o/w documented in the hpi    Objective:     Vital Signs (Most Recent):  Temp: 98.1 °F (36.7 °C) (05/09/24 0734)  Pulse: 75 (05/09/24 0734)  Resp: 20 (05/09/24 0734)  BP: 132/84 (05/09/24 0734)  SpO2: 97 % (05/09/24 0734) Vital Signs (24h Range):  Temp:  [97.3 °F (36.3 °C)-98.2 °F (36.8 °C)] 98.1 °F (36.7 °C)  Pulse:  [62-75] 75  Resp:  [16-20] 20  SpO2:  [96 %-100 %] 97 %  BP: (111-141)/(80-95) 132/84     Weight: 131.5 kg (290 lb)  Body mass index is 42.83 kg/m².     Physical Exam  Vitals reviewed.   Constitutional:       General: She is awake.   HENT:      Head: Normocephalic and atraumatic.      Nose: Nose normal.      Mouth/Throat:      Mouth: Mucous membranes are moist.      Pharynx: Oropharynx is clear.   Eyes:      Extraocular Movements: Extraocular movements intact and EOM normal.      Pupils: Pupils are equal, round, and reactive to light.   Pulmonary:      Effort: Pulmonary effort is normal.   Skin:     General: Skin is dry.   Neurological:      Mental Status: She is alert and oriented  to person, place, and time.   Psychiatric:         Mood and Affect: Mood normal.         Speech: Speech normal.         Behavior: Behavior is cooperative.         Thought Content: Thought content normal.         Judgment: Judgment normal.          NEUROLOGICAL EXAMINATION:     MENTAL STATUS   Oriented to person, place, and time.   Follows 3 step commands.   Attention: normal. Concentration: normal.   Speech: speech is normal   Level of consciousness: alert  Knowledge: good.     CRANIAL NERVES     CN II   Visual fields full to confrontation.     CN III, IV, VI   Pupils are equal, round, and reactive to light.  Extraocular motions are normal.   Nystagmus: none   Conjugate gaze: present    CN V   Facial sensation intact.     CN VII   Facial expression full, symmetric.     MOTOR EXAM   Right arm pronator drift: absent  Left arm pronator drift: absent       BUE 5/5  LLE 5/5  RLE 3/5     SENSORY EXAM   Light touch normal.       Significant Labs:   Recent Lab Results         05/08/24  1815        Free T4 0.73       T3, Free 1.86               Significant Imaging:   MRI c-spine w w/o:   FINDINGS:  CORD: Normal size and signal.  No abnormal intrathecal enhancement.  No syrinx.  Cervicomedullary junction is normal.     ALIGNMENT: No spondylolisthesis.  Straightening of the normal cervical lordosis.  Lateral masses of C1 and C2 are congruent.     BONES: Vertebral body heights are maintained.  No aggressive bone marrow signal.     PARASPINAL AREA: Normal.     Other: Adenoidal hypertrophy.     CERVICAL DISC LEVELS:     C2-C3: Unremarkable.     C3-C4: Unremarkable.     C4-C5: Shallow central disc protrusion causing mild central canal stenosis.  The foramen are patent.     C5-C6: Unremarkable.     C6-C7: Unremarkable.     C7-T1: Unremarkable.     Impression:     C4-C5 shallow central disc protrusion causing mild central canal stenosis.  No cord impingement.     The foramen are widely patent at each cervical disc level.      Straightening of the normal cervical lordosis.     MRI l-spine w w/o:  INDINGS:  NOMENCLATURE: Five lumbar type vertebral bodies.     CORD/CAUDA EQUINA: Conus has normal size and signal and ends at a normal level.  No abnormal intrathecal enhancement.     ALIGNMENT: Normal.     BONES: Vertebral body heights are maintained.  No aggressive bone marrow signal.     PARASPINAL AREA: Normal.     LUMBAR DISC LEVELS:     T12-L1: No disc herniation or significant posterior osteophytic ridging. No significant spinal canal or foraminal stenosis.     L1-L2: No disc herniation or significant posterior osteophytic ridging. No significant spinal canal or foraminal stenosis.     L2-L3: No disc herniation or significant posterior osteophytic ridging. No significant spinal canal or foraminal stenosis.     L3-L4: No disc herniation or significant posterior osteophytic ridging.  Mild right facet arthropathy.  No significant spinal canal or foraminal stenosis.     L4-L5: No disc herniation or significant posterior osteophytic ridging.  Mild bilateral facet arthropathy.  No significant spinal canal or foraminal stenosis.     L5-S1: No disc herniation or significant posterior osteophytic ridging. No significant spinal canal or foraminal stenosis.     Impression:     No stenosis or nerve impingement.     Mild facet arthropathy bilaterally at L4-L5 and on the right at L3-L4.       I have reviewed all pertinent imaging results/findings within the past 24 hours.

## 2024-05-09 NOTE — PROGRESS NOTES
[facility]                       [address] [id number]    PATIENT NAME:       TOYA PERRY  YOB: 1975  CSN:                [id number]   MRN:                [id number]  ADMIT DATE:         05/06/2024 11:28:00  PHYSICIAN:          Raj Yusuf MD                            PROGRESS NOTE    DATE:      SUBJECTIVE:  A 48-year-old  female.  She is doing a little   better today.  She was able to walk with a walker, but unsteadily.  She has   significant pain that goes from the back of her neck in the right side, goes all   way up to her right eye.  She denies any nausea, vomiting.  She is able to be   with less blurred vision and less fogginess in her brain.  Her blood pressure   has been stable.  Her blood sugars have been stable.  She has not had any other   new problems.  She seems to be better than yesterday.  She is afebrile.    REVIEW OF SYSTEMS:  Times 12 as above.    OBJECTIVE:  VITAL SIGNS:  Blood pressure is 141/89, pulse 66, and temp 98.  GENERAL APPEARANCE:  She is alert, in no acute distress with a significant neck   pain and headache.  HEART:  Regular rhythm and rate.  LUNGS:  Clear.  ABDOMEN:  Soft and nontender.  Bowel sounds present.    EXTREMITIES:  No clubbing, cyanosis, or edema.  She has some right-sided   weakness and numbness.  She had some mild pronator drift in the right hand and   right dorsiflexion and plantarflexion.  Numbness of the right arm and right leg.    LABORATORY DATA:  There are no new labs.    IMPRESSION:    1. Chest pain, most likely noncardiac and Cardiology already signed off.  2. She has some deficit as noted above, cannot rule out which is less likely an   acute cerebrovascular accident versus some kind of neurological issue including   atypical migraine.  3. She does have a cervical headache.  4. History of migraines.  5. Hypertension.  6.  Dyslipidemia.  7. Diabetes mellitus type 2.  8. Sleep apnea.  9. Some carotid artery disease which is mild.  10. Hypothyroidism.  11. Chronic low back pain and neck pain.  12. History of chronic lymphocytic leukemia.    PLAN:  We will continue PT, OT, ST.  We will continue to follow Neuro   recommendations.  We are waiting for MRI of the cervical spine and lumbar spine   that we added just now.  Also, we will continue DVT prophylaxis and telemetry.    We will give her some muscle relaxants, some Biofreeze or Bengay for her neck as   well as some pain medication.        ______________________________  MD JOSE Sanchez/ROSALIE  DD:  05/08/2024  Time:  05:35PM  DT:  05/08/2024  Time:  08:01PM  Job #:  [id number]      PROGRESS NOTE

## 2024-05-09 NOTE — PT/OT/SLP PROGRESS
Occupational Therapy   Treatment    Name: Cristina Lazar  MRN: [id number]  Admitting Diagnosis:  RUCESILIA weakenss    Recommendations:     Recommended therapy intensity at discharge: High Intensity Therapy   Discharge Equipment Recommendations:  to be determined by next level of care  Barriers to discharge:  Decreased caregiver support, Inaccessible home environment (ongoing medical needs)    Assessment:     Cristina Lazar is a 48 y.o. female with a medical diagnosis of R-sided neck pain and RUE weakness. CT head negative for acute abnormality. CTA head/neck unrevealing for LVO, flow limiting stenosis, or aneurysms. MRI brain w/o unrevealing for acute infarct or any other intracranial abnormalities. She presents with poor endurance and fair motivation to participate. Performance deficits affecting function are weakness, impaired endurance, impaired self care skills, impaired sensation, impaired functional mobility, decreased coordination, decreased upper extremity function, impaired fine motor.     Recommending high intensity right now, patient may progress during hospital stay. She is alone at home throughout day due to  working and no other social support. She also has steps to enter her home. Patient was educated on what high intensity therapy entails this session. At this time OT does not believe she could tolerate it, but she has been progressing every day with therapy during this admit. Patient states she will not go to a nursing home for therapy. Will continue to document progress during hospital stay.    Rehab Prognosis:  Good; patient would benefit from acute skilled OT services to address these deficits and reach maximum level of function.       Plan:     Patient to be seen 5 x/week to address the above listed problems via self-care/home management, therapeutic activities, therapeutic exercises  Plan of Care Expires: 06/07/24  Plan of Care Reviewed with: patient, spouse    Subjective      Pain/Comfort:  Pain Rating 1: 0/10    Objective:     Communicated with: nurse prior to session.  Patient found HOB elevated with peripheral IV, telemetry, pulse ox (continuous), PureWick upon OT entry to room.    General Precautions: Standard, fall    Orthopedic Precautions:N/A  Braces: N/A  Respiratory Status: Room air  Vital Signs: WNL     Occupational Performance:     Bed Mobility:    Patient completed Supine to Sit with stand by assistance  Patient completed Sit to Supine with stand by assistance     Functional Mobility/Transfers:  Patient completed Sit <> Stand Transfer with contact guard assistance  with  grab bars(s)   Patient completed Toilet Transfer Step Transfer technique with contact guard assistance with  rolling walker and grab bars  Functional Mobility: patient ambulated in hallway with fair+ balance before needing to sit down due to fatigue    Activities of Daily Living:  Toileting: contact guard assistance standing for hygiene    Therapeutic Activities:  Grooming: standing at sink with SBA to wash hands     Patient Education:  Patient and spouse were provided with verbal education education regarding OT role/goals/POC, fall prevention, safety awareness, and Discharge/DME recommendations.  Understanding was verbalized.      Patient left HOB elevated with all lines intact, call button in reach, nurse notified, and  present.    GOALS:   Multidisciplinary Problems       Occupational Therapy Goals          Problem: Occupational Therapy    Goal Priority Disciplines Outcome Interventions   Occupational Therapy Goal     OT, PT/OT Progressing    Description: Goals to be met by 6/7/2024    Pt will complete feeding with modified independence.  Pt will complete grooming standing at sink with LRAD with modified independence.  Pt will complete UB dressing with modified independence.  Pt will complete LB dressing with modified independence using LRAD.  Pt will complete toileting with modified  independence using LRAD.  Pt will complete functional mobility to/from toilet and toilet transfer with modified independence using LRAD.  Pt will demo increased strength in R UE to 5/5 MMT for increased functional use during ADL tasks.                         Time Tracking:     OT Date of Treatment: 05/09/24  OT Start Time: 1405  OT Stop Time: 1450  OT Total Time (min): 45 min    Billable Minutes:Self Care/Home Management 45    OT/MARIA ELENA: OT     Number of MARIA ELENA visits since last OT visit: 2    5/9/2024

## 2024-05-09 NOTE — ASSESSMENT & PLAN NOTE
Possible occipital neuralgia     -on qulipta (alternates between qulipta, topiramate, and ubrelvy)  -gabapentin resumed  -previously on lyrica, w/o improvement  -defer to MD for further migraine medication management  -recommend seeing neurologist o/p as well for migraine management

## 2024-05-09 NOTE — PT/OT/SLP PROGRESS
[facility]  Speech Language Pathology Department  Therapy Progress Note    Patient Name:  Cristina Lazar   MRN:  [id number]    Recommendations     General recommendations:  SLP intervention not indicated  Communication strategies:  go to room if call light pushed    Discharge therapy intensity: No Therapy Indicated   Barriers to safe discharge: none    Subjective     Patient awake, alert, and cooperative.    Pain/Comfort: Pain Rating 1: 0/10  Spiritual/Cultural/Restoration Beliefs/Practices that affect care: no    Objective     Answered simple yes/no questions with 100% accuracy.   Followed 2-step directions with 100% accuracy.     Participated in structured and conversational word finding tasks without difficulty. Patient reports that she is back to her normal baseline for communication.     Assessment     Patient is at baseline for cognitive-linguistic skills. Reviewed POC and discharge from ST with patient; understanding verbalized. ST to sign off at this time.    Goals     MET  Multidisciplinary Problems       SLP Goals          Problem: SLP    Goal Priority Disciplines Outcome   SLP Goal     SLP    Description: LT. To increase expressive/receptive language skills to enhance functional communication with less than a 10% breakdown.   ST. Answer simple yes/no questions with 90% accuracy.   2. Follow 2-step directions with 90% accuracy.   3. Complete Simple phrases with minimal cueing.   4. Picture description with minimal cueing.                        Patient Education     Patient provided with verbal education regarding ST POC.  Understanding was verbalized.    Plan     Will continue to follow and tx as appropriate.    SLP Follow-Up:  No   Patient to be seen:  5 x/week   Plan of Care expires:  24  Plan of Care reviewed with:  patient     Time Tracking     SLP Treatment Date:   24  Speech Start Time:  1400  Speech Stop Time:  1420     Speech Total Time (min):  20  min    Billable minutes:  Speech/Language Treatment- Individual, 20 minutes     05/09/2024

## 2024-05-09 NOTE — CONSULTS
[facility] - 4th Floor Medical Telemetry  Neurology  Consult Note    Patient Name: Cristina Lazar  MRN: [id number]  Admission Date: 5/6/2024  Hospital Length of Stay: 3 days  Code Status: Full Code   Attending Provider: Raj Yusuf MD   Consulting Provider: Sherri العراقي [facility]  Primary Care Physician: Raj Yusuf MD  Principal Problem:<principal problem not specified>    Inpatient consult to Neurology  Consult performed by: Sherri العراقي FNP  Consult ordered by: Raj Yusuf MD         Subjective:     Chief Complaint:       HPI:   Cristina Lazar is a 48 year-old female with past medical history of CLL ( curretnly on oral chemo), DM, HTN, JIMBO, and Thyroid Disease who presented to [facility] on 5/6/2024 with complaints of CP, SOB, and Palpitations that began on Friday. She also endorsed right arm and right leg numbness, tingling and heaviness. She described it as if she were dragging her right leg. Patient reports RUE numbness and heaviness resolved, however, continues to have RLE numbness. Pt reports RLE weakness has improved and the RLE discomfort she is having now is d/t her chronic neuropathy for which she has been on gabapentin for.     CT head negative for acute abnormality. CTA head/neck unrevealing for LVO, flow limiting stenosis, or aneurysms. MRI brain w/o unrevealing for acute infarct or any other intracranial abnormalities.    Patient endorsing right-sided neck pain that radiates to R occipital region and right eye.  Patient currently on Qulipta and Calquence. Pt reports headache managed per her PCP for which he alternates Qulipta, topiramate, and ubrelvy for maintenance medications. Pt reports at least 3 migraine days per week where she has to sit in a dark room for hours and sometimes associated with n/v. Pt reports headache is eventually resolved with Fioricet.      Past Medical History:   Diagnosis Date    Arthritis     CLL (chronic lymphocytic leukemia)     Diabetes  mellitus     Hypertension     Migraines     Sleep apnea     cpap    Thyroid disease     states recently instructed she could stop thyroid med.       Past Surgical History:   Procedure Laterality Date    BIOPSY OF AXILLARY NODE Left 8/18/2023    Procedure: BIOPSY, LYMPH NODE, AXILLARY;  Surgeon: Sean Styles MD;  Location: [facility];  Service: General;  Laterality: Left;    CARPAL TUNNEL RELEASE Right     CHOLECYSTECTOMY      HYSTERECTOMY      KNEE LIGAMENT RECONSTRUCTION Right     LYMPH NODE BIOPSY Left 8/18/2023    Procedure: BIOPSY, LYMPH NODE Left  Axilla 2cm;  Surgeon: Sean Styles MD;  Location: [facility] OR;  Service: General;  Laterality: Left;    DIANA-EN-Y PROCEDURE      rt foot Right     for neuropathy    TONSILLECTOMY AND ADENOIDECTOMY      TUBAL LIGATION         Review of patient's allergies indicates:  No Known Allergies    Current Neurological Medications:     No current facility-administered medications on file prior to encounter.     Current Outpatient Medications on File Prior to Encounter   Medication Sig    albuterol (PROAIR HFA) 90 mcg/actuation inhaler Inhale 2 puffs into the lungs every 6 (six) hours as needed for Wheezing. Rescue    amoxicillin-clavulanate 875-125mg (AUGMENTIN) 875-125 mg per tablet Take 1 tablet by mouth 2 (two) times daily.    carvediloL (COREG) 25 MG tablet Take 1 tablet (25 mg total) by mouth 2 (two) times daily.    cloNIDine (CATAPRES) 0.1 MG tablet Take 1 tablet (0.1 mg total) by mouth every 4 (four) hours as needed (BP over 160).    cloNIDine 0.2 mg/24 hr td ptwk (CATAPRES) 0.2 mg/24 hr 1 patch every 7 days.    ergocalciferol (ERGOCALCIFEROL) 50,000 unit Cap Take 1 capsule (50,000 Units total) by mouth every 7 days.    folic acid (FOLVITE) 1 MG tablet Take 1 tablet (1 mg total) by mouth once daily.    hydrALAZINE (APRESOLINE) 100 MG tablet Take 150 mg by mouth 2 (two) times daily.    HYDROcodone-acetaminophen (NORCO) 7.5-325 mg per tablet Take 1 tablet by mouth  every 6 (six) hours as needed for Pain.    levothyroxine (SYNTHROID) 75 MCG tablet Take 1 tablet (75 mcg total) by mouth before breakfast.    metFORMIN (GLUCOPHAGE) 1000 MG tablet Take 1,000 mg by mouth once daily.    PREMARIN vaginal cream Place vaginally.    sucralfate (CARAFATE) 1 gram tablet Take 1 tablet (1 g total) by mouth 4 (four) times daily before meals and nightly.    topiramate (TOPAMAX) 200 MG Tab Take 1 tablet (200 mg total) by mouth 2 (two) times daily.     Family History       Problem Relation (Age of Onset)    Diabetes Mother    Heart failure Father          Tobacco Use    Smoking status: Never    Smokeless tobacco: Never   Substance and Sexual Activity    Alcohol use: Yes     Comment: wine socially    Drug use: Never    Sexual activity: Yes     Review of Systems  A 14pt ros was reviewed & is negative unless o/w documented in the hpi    Objective:     Vital Signs (Most Recent):  Temp: 98.1 °F (36.7 °C) (05/09/24 0734)  Pulse: 75 (05/09/24 0734)  Resp: 20 (05/09/24 0734)  BP: 132/84 (05/09/24 0734)  SpO2: 97 % (05/09/24 0734) Vital Signs (24h Range):  Temp:  [97.3 °F (36.3 °C)-98.2 °F (36.8 °C)] 98.1 °F (36.7 °C)  Pulse:  [62-75] 75  Resp:  [16-20] 20  SpO2:  [96 %-100 %] 97 %  BP: (111-141)/(80-95) 132/84     Weight: 131.5 kg (290 lb)  Body mass index is 42.83 kg/m².     Physical Exam  Vitals reviewed.   Constitutional:       General: She is awake.   HENT:      Head: Normocephalic and atraumatic.      Nose: Nose normal.      Mouth/Throat:      Mouth: Mucous membranes are moist.      Pharynx: Oropharynx is clear.   Eyes:      Extraocular Movements: Extraocular movements intact and EOM normal.      Pupils: Pupils are equal, round, and reactive to light.   Pulmonary:      Effort: Pulmonary effort is normal.   Skin:     General: Skin is dry.   Neurological:      Mental Status: She is alert and oriented to person, place, and time.   Psychiatric:         Mood and Affect: Mood normal.         Speech:  Speech normal.         Behavior: Behavior is cooperative.         Thought Content: Thought content normal.         Judgment: Judgment normal.          NEUROLOGICAL EXAMINATION:     MENTAL STATUS   Oriented to person, place, and time.   Follows 3 step commands.   Attention: normal. Concentration: normal.   Speech: speech is normal   Level of consciousness: alert  Knowledge: good.     CRANIAL NERVES     CN II   Visual fields full to confrontation.     CN III, IV, VI   Pupils are equal, round, and reactive to light.  Extraocular motions are normal.   Nystagmus: none   Conjugate gaze: present    CN V   Facial sensation intact.     CN VII   Facial expression full, symmetric.     MOTOR EXAM   Right arm pronator drift: absent  Left arm pronator drift: absent       BUE 5/5  LLE 5/5  RLE 3/5     SENSORY EXAM   Light touch normal.       Significant Labs:   Recent Lab Results         05/08/24  1815        Free T4 0.73       T3, Free 1.86               Significant Imaging:   MRI c-spine w w/o:   FINDINGS:  CORD: Normal size and signal.  No abnormal intrathecal enhancement.  No syrinx.  Cervicomedullary junction is normal.     ALIGNMENT: No spondylolisthesis.  Straightening of the normal cervical lordosis.  Lateral masses of C1 and C2 are congruent.     BONES: Vertebral body heights are maintained.  No aggressive bone marrow signal.     PARASPINAL AREA: Normal.     Other: Adenoidal hypertrophy.     CERVICAL DISC LEVELS:     C2-C3: Unremarkable.     C3-C4: Unremarkable.     C4-C5: Shallow central disc protrusion causing mild central canal stenosis.  The foramen are patent.     C5-C6: Unremarkable.     C6-C7: Unremarkable.     C7-T1: Unremarkable.     Impression:     C4-C5 shallow central disc protrusion causing mild central canal stenosis.  No cord impingement.     The foramen are widely patent at each cervical disc level.     Straightening of the normal cervical lordosis.     MRI l-spine w w/o:  INDINGS:  NOMENCLATURE: Five  lumbar type vertebral bodies.     CORD/CAUDA EQUINA: Conus has normal size and signal and ends at a normal level.  No abnormal intrathecal enhancement.     ALIGNMENT: Normal.     BONES: Vertebral body heights are maintained.  No aggressive bone marrow signal.     PARASPINAL AREA: Normal.     LUMBAR DISC LEVELS:     T12-L1: No disc herniation or significant posterior osteophytic ridging. No significant spinal canal or foraminal stenosis.     L1-L2: No disc herniation or significant posterior osteophytic ridging. No significant spinal canal or foraminal stenosis.     L2-L3: No disc herniation or significant posterior osteophytic ridging. No significant spinal canal or foraminal stenosis.     L3-L4: No disc herniation or significant posterior osteophytic ridging.  Mild right facet arthropathy.  No significant spinal canal or foraminal stenosis.     L4-L5: No disc herniation or significant posterior osteophytic ridging.  Mild bilateral facet arthropathy.  No significant spinal canal or foraminal stenosis.     L5-S1: No disc herniation or significant posterior osteophytic ridging. No significant spinal canal or foraminal stenosis.     Impression:     No stenosis or nerve impingement.     Mild facet arthropathy bilaterally at L4-L5 and on the right at L3-L4.       I have reviewed all pertinent imaging results/findings within the past 24 hours.  Assessment and Plan:     Migraine without aura  Possible occipital neuralgia     -on qulipta (alternates between qulipta, topiramate, and ubrelvy)  -gabapentin resumed  -previously on lyrica, w/o improvement  -defer to MD for further migraine medication management  -recommend seeing neurologist o/p as well for migraine management      VTE Risk Mitigation (From admission, onward)           Ordered     IP VTE HIGH RISK PATIENT  Once         05/06/24 1710     Place sequential compression device  Until discontinued         05/06/24 1710     Place sequential compression device  Until  discontinued         05/06/24 6379                    Thank you for your consult.  Further recommendations to follow per MD Sherri العراقي, [facility]  Inpatient Neurology  [facility] - 4th Floor Medical Telemetry    I have seen/examined the patient.  NP was scribe.  I agree with the findings unless outlined below:    Adam N Foreman, MD [facility]     Strong chronic migraine hx  Superimposed/concomitant occipital neuralgia contributing to above    Plan  Ok to dc home as stroke  w/u negative  Rtc to my offic etomorrow for occipital nerve block

## 2024-05-10 VITALS
OXYGEN SATURATION: 97 % | RESPIRATION RATE: 18 BRPM | WEIGHT: 290 LBS | DIASTOLIC BLOOD PRESSURE: 78 MMHG | TEMPERATURE: 98 F | SYSTOLIC BLOOD PRESSURE: 116 MMHG | BODY MASS INDEX: 42.95 KG/M2 | HEIGHT: 69 IN | HEART RATE: 70 BPM

## 2024-05-10 PROBLEM — R07.9 CHEST PAIN: Status: ACTIVE | Noted: 2024-05-10

## 2024-05-10 PROBLEM — R20.2 NUMBNESS AND TINGLING OF RIGHT ARM: Status: ACTIVE | Noted: 2024-05-10

## 2024-05-10 PROBLEM — G45.9 TIA (TRANSIENT ISCHEMIC ATTACK): Status: RESOLVED | Noted: 2024-05-06 | Resolved: 2024-05-10

## 2024-05-10 PROBLEM — R20.0 NUMBNESS AND TINGLING OF RIGHT ARM: Status: ACTIVE | Noted: 2024-05-10

## 2024-05-10 PROBLEM — R07.9 CHEST PAIN: Status: RESOLVED | Noted: 2024-05-10 | Resolved: 2024-05-10

## 2024-05-10 PROCEDURE — 97116 GAIT TRAINING THERAPY: CPT

## 2024-05-10 PROCEDURE — 25000003 PHARM REV CODE 250: Performed by: INTERNAL MEDICINE

## 2024-05-10 PROCEDURE — 25000003 PHARM REV CODE 250

## 2024-05-10 PROCEDURE — 97530 THERAPEUTIC ACTIVITIES: CPT

## 2024-05-10 PROCEDURE — 97535 SELF CARE MNGMENT TRAINING: CPT

## 2024-05-10 PROCEDURE — 99499 UNLISTED E&M SERVICE: CPT | Mod: GT,,, | Performed by: PSYCHIATRY & NEUROLOGY

## 2024-05-10 RX ORDER — ATORVASTATIN CALCIUM 40 MG/1
40 TABLET, FILM COATED ORAL DAILY
Qty: 90 TABLET | Refills: 3 | Status: SHIPPED | OUTPATIENT
Start: 2024-05-11 | End: 2025-05-11

## 2024-05-10 RX ORDER — BUTALBITAL, ACETAMINOPHEN AND CAFFEINE 50; 325; 40 MG/1; MG/1; MG/1
1 TABLET ORAL EVERY 6 HOURS PRN
Qty: 40 TABLET | Refills: 3 | Status: SHIPPED | OUTPATIENT
Start: 2024-05-10 | End: 2024-06-09

## 2024-05-10 RX ORDER — LOSARTAN POTASSIUM 25 MG/1
25 TABLET ORAL 2 TIMES DAILY
Qty: 180 TABLET | Refills: 3 | Status: SHIPPED | OUTPATIENT
Start: 2024-05-10 | End: 2025-05-10

## 2024-05-10 RX ORDER — ASPIRIN 81 MG/1
81 TABLET ORAL DAILY
Qty: 100 TABLET | Refills: 0 | Status: SHIPPED | OUTPATIENT
Start: 2024-05-11 | End: 2025-05-11

## 2024-05-10 RX ORDER — GABAPENTIN 300 MG/1
300 CAPSULE ORAL 3 TIMES DAILY
Qty: 90 CAPSULE | Refills: 11 | Status: SHIPPED | OUTPATIENT
Start: 2024-05-10 | End: 2025-05-10

## 2024-05-10 RX ORDER — LIOTHYRONINE SODIUM 50 UG/1
50 TABLET ORAL DAILY
Qty: 30 TABLET | Refills: 11 | Status: SHIPPED | OUTPATIENT
Start: 2024-05-11 | End: 2025-05-11

## 2024-05-10 RX ORDER — METHOCARBAMOL 500 MG/1
500 TABLET, FILM COATED ORAL EVERY 4 HOURS PRN
Qty: 40 TABLET | Refills: 3 | Status: SHIPPED | OUTPATIENT
Start: 2024-05-10 | End: 2024-05-20

## 2024-05-10 RX ADMIN — LOSARTAN POTASSIUM 25 MG: 25 TABLET, FILM COATED ORAL at 08:05

## 2024-05-10 RX ADMIN — LIOTHYRONINE SODIUM 50 MCG: 25 TABLET ORAL at 08:05

## 2024-05-10 RX ADMIN — ASPIRIN 81 MG: 81 TABLET, COATED ORAL at 08:05

## 2024-05-10 RX ADMIN — GABAPENTIN 300 MG: 300 CAPSULE ORAL at 08:05

## 2024-05-10 RX ADMIN — ATORVASTATIN CALCIUM 40 MG: 40 TABLET, FILM COATED ORAL at 08:05

## 2024-05-10 RX ADMIN — FOLIC ACID 1 MG: 1 TABLET ORAL at 08:05

## 2024-05-10 NOTE — PLAN OF CARE
Problem: Adult Inpatient Plan of Care  Goal: Plan of Care Review  Outcome: Progressing  Goal: Patient-Specific Goal (Individualized)  Outcome: Progressing  Goal: Absence of Hospital-Acquired Illness or Injury  Outcome: Progressing  Goal: Optimal Comfort and Wellbeing  Outcome: Progressing  Goal: Readiness for Transition of Care  Outcome: Progressing     Problem: Bariatric Environmental Safety  Goal: Safety Maintained with Care  Outcome: Progressing     Problem: Infection  Goal: Absence of Infection Signs and Symptoms  Outcome: Progressing     Problem: Stroke, Ischemic (Includes Transient Ischemic Attack)  Goal: Optimal Coping  Outcome: Progressing  Goal: Effective Bowel Elimination  Outcome: Progressing  Goal: Optimal Cerebral Tissue Perfusion  Outcome: Progressing  Goal: Optimal Cognitive Function  Outcome: Progressing  Goal: Improved Communication Skills  Outcome: Progressing  Goal: Optimal Functional Ability  Outcome: Progressing  Goal: Optimal Nutrition Intake  Outcome: Progressing  Goal: Effective Oxygenation and Ventilation  Outcome: Progressing  Goal: Improved Sensorimotor Function  Outcome: Progressing  Goal: Safe and Effective Swallow  Outcome: Progressing  Goal: Effective Urinary Elimination  Outcome: Progressing

## 2024-05-10 NOTE — PLAN OF CARE
05/10/24 1348   Discharge Reassessment   Assessment Type Discharge Planning Reassessment   Did the patient's condition or plan change since previous assessment? No   Discharge Plan discussed with: Patient;Spouse/sig other   Communicated KUMAR with patient/caregiver Yes   Discharge Plan A Home Health   Discharge Plan B Home Health   DME Needed Upon Discharge  walker, rolling   Transition of Care Barriers None   Post-Acute Status   Post-Acute Authorization HME;Home Health   HME Status Referrals Sent   Home Health Status Set-up Complete/Auth obtained   Hospital Resources/Appts/Education Provided Post-Acute resouces added to AVS   Patient choice form signed by patient/caregiver List with quality metrics by geographic area provided   Discharge Delays None known at this time     Spoke to patient about home health and equipment. Freedom of choice obtained. Referral sent to [facility] but they declined. They are not in network. Referral sent to [facility] and they accepted. Referral sent to [facility] for rolling walker and has been delivered to bedside.

## 2024-05-10 NOTE — ASSESSMENT & PLAN NOTE
-on qulipta (alternates between qulipta, topiramate, and ubrelvy)  -plans for o/p occipital nerve block with Dr. Chou  -Further recommendations per MD

## 2024-05-10 NOTE — PT/OT/SLP PROGRESS
Occupational Therapy   Treatment    Name: Cristina Lazar  MRN: [id number]    Recommendations:     Recommended therapy intensity at discharge: Low Intensity Therapy   Discharge Equipment Recommendations:  walker, rolling  Barriers to discharge:  Decreased caregiver support    Assessment:     Cristina Lazar is a 48 y.o. female with a medical diagnosis of migraine without aura; possible occipital neuralgia with R-sided neck pain and RUE weakness. CT head negative for acute abnormality. CTA head/neck unrevealing for LVO, flow limiting stenosis, or aneurysms. MRI brain w/o unrevealing for acute infarct or any other intracranial abnormalities.  She presents with increased motivation and participation due to wanting to be home for mother's day. State's yesterday's conversation lead her to do better today. Performance deficits affecting function are weakness, impaired endurance, impaired self care skills, impaired sensation, impaired functional mobility, decreased coordination, decreased upper extremity function, impaired fine motor. Patient is safe to return home with low intensity therapy once medically released.    Rehab Prognosis:  Good; patient would benefit from acute skilled OT services to address these deficits and reach maximum level of function.       Plan:     Patient to be seen 5 x/week to address the above listed problems via self-care/home management, therapeutic activities, therapeutic exercises  Plan of Care Expires: 06/07/24  Plan of Care Reviewed with: patient    Subjective     Pain/Comfort:  Pain Rating 1: 0/10    Objective:     Communicated with: nurse prior to session.  Patient found HOB elevated with telemetry, pulse ox (continuous), peripheral IV upon OT entry to room.    General Precautions: Standard, fall    Orthopedic Precautions:N/A  Braces: N/A  Respiratory Status: Room air  Vital Signs: WNL     Occupational Performance:     Bed Mobility:    Patient completed Supine to Sit with  independence  Patient completed Sit to Supine with independence     Functional Mobility/Transfers:  Patient completed Sit <> Stand Transfer with independence  with  rolling walker   Patient completed Toilet Transfer Step Transfer technique with independence with  rolling walker  Functional Mobility: no LOB but dragging R foot; patient educated to ONLY wear socks or shoes with  at home    Activities of Daily Living:  Lower Body Dressing: independence donned shoes  Toileting: independence      Patient Education:  Patient provided with verbal education education regarding OT role/goals/POC, fall prevention, safety awareness, and Discharge/DME recommendations.  Understanding was verbalized.      Patient left HOB elevated with all lines intact, call button in reach, and nurse notified.    GOALS:   Multidisciplinary Problems       Occupational Therapy Goals          Problem: Occupational Therapy    Goal Priority Disciplines Outcome Interventions   Occupational Therapy Goal     OT, PT/OT Progressing    Description: Goals to be met by 6/7/2024    Pt will complete feeding with modified independence.  Pt will complete grooming standing at sink with LRAD with modified independence.  Pt will complete UB dressing with modified independence.  Pt will complete LB dressing with modified independence using LRAD.  Pt will complete toileting with modified independence using LRAD.  Pt will complete functional mobility to/from toilet and toilet transfer with modified independence using LRAD.  Pt will demo increased strength in R UE to 5/5 MMT for increased functional use during ADL tasks.                         Time Tracking:     OT Date of Treatment: 05/10/24  OT Start Time: 1430  OT Stop Time: 1441  OT Total Time (min): 11 min    Billable Minutes:Self Care/Home Management 11    OT/MARIA ELENA: OT     Number of MARIA ELENA visits since last OT visit: 3    5/10/2024

## 2024-05-10 NOTE — PROGRESS NOTES
[facility]                       [address] [place] [id number]    PATIENT NAME:       TOYA PERRY  YOB: 1975  CSN:                [id number]   MRN:                [id number]  ADMIT DATE:         05/06/2024 11:28:00  PHYSICIAN:          Raj Yusuf MD                            PROGRESS NOTE    DATE:      SUBJECTIVE:  A 48-year-old  female.  She was evaluated by Neuro.    She denies any shortness of breath, chest pain, palpitations.  Blood pressure   has been fairly well controlled, also has high blood sugar.  She has been   afebrile.  No significant new issues.    REVIEW OF SYSTEMS:  Times 12 as above.    OBJECTIVE:  VITAL SIGNS:  Blood pressure 116/83, pulse 76, and temp 98.4.  GENERAL APPEARANCE:  She is alert, in no acute distress.  HEENT:  Normocephalic, atraumatic.  PERRLA.  EOMI.  NECK:  Supple.  No JVD.  No bruits.  HEART:  Regular rhythm and rate.  LUNGS:  Clear to auscultation.  ABDOMEN:  Soft, nontender.  Bowel sounds present and normal.  NEUROLOGICAL:  Cranial nerves 2 through 12 are intact.  Right lower extremity   3/5.  Speech is normal.  Oriented x3.    LABORATORY DATA:  Free T3 and free T4 are in the low normal side.  MRI of the   C-spine reveals some mild central canal stenosis, C4-C5.  Lumbar MRI is   unremarkable.    IMPRESSION:    1. Acute neuro deficit, possible occipital neuralgia, neurological deficit is still not fully   understood.  We will discuss with Neuro tomorrow.  2. History of chronic migraine and possible occipital neuralgia on the right   side.  3.htn  4.dm2  5CLL    PLAN:  Continue with current medications.  DVT prophylaxis as well as vital sign   check.  Most likely she is going to be discharged home tomorrow.        ______________________________  Raj Yusuf MD    JOSE/AQS  DD:  05/09/2024  Time:  07:39PM  DT:  05/09/2024  Time:  10:50PM  Job #:   [id number]      PROGRESS NOTE

## 2024-05-10 NOTE — PROGRESS NOTES
[facility] - 4th Floor Medical Telemetry  Neurology  Progress Note    Patient Name: Cristina Lazar  MRN: [id number]  Admission Date: 5/6/2024  Hospital Length of Stay: 4 days  Code Status: Full Code   Attending Provider: Raj Yusuf MD  Primary Care Physician: Raj Yusuf MD   Principal Problem:<principal problem not specified>    HPI:   Cristina Lazar is a 48 year-old female with past medical history of CLL ( curretnly on oral chemo), DM, HTN, JIMBO, and Thyroid Disease who presented to [facility] on 5/6/2024 with complaints of CP, SOB, and Palpitations that began on Friday. She also endorsed right arm and right leg numbness, tingling and heaviness. She described it as if she were dragging her right leg. Patient reports RUE numbness and heaviness resolved, however, continues to have RLE numbness. Pt reports RLE weakness has improved and the RLE discomfort she is having now is d/t her chronic neuropathy for which she has been on gabapentin for.     CT head negative for acute abnormality. CTA head/neck unrevealing for LVO, flow limiting stenosis, or aneurysms. MRI brain w/o unrevealing for acute infarct or any other intracranial abnormalities.    Patient endorsing right-sided neck pain that radiates to R occipital region and right eye.  Patient currently on Qulipta and Calquence. Pt reports headache managed per her PCP for which he alternates Qulipta, topiramate, and ubrelvy for maintenance medications. Pt reports at least 3 migraine days per week where she has to sit in a dark room for hours and sometimes associated with n/v. Pt reports headache is eventually resolved with Fioricet.     Overview/Hospital Course:  No notes on file        Subjective:     Interval History:   Neurologic exam remains stable and unchanged, headache more tolerable.     Current Neurological Medications:     Current Facility-Administered Medications   Medication Dose Route Frequency Provider Last Rate Last Admin     acalabrutinib maleate Tab 100 mg  100 mg Oral BID Raj Yusuf MD   100 mg at 05/10/24 0847    acetaminophen tablet 650 mg  650 mg Oral Q8H PRN Bakari Brasher PA-C   650 mg at 05/09/24 0630    albuterol inhaler 2 puff  2 puff Inhalation Q6H PRN Raj Yusuf MD        aspirin EC tablet 81 mg  81 mg Oral Daily Tanya Mancini, NP   81 mg at 05/10/24 0847    atogepant Tab 60 mg  60 mg Oral Daily Raj Yusuf MD   60 mg at 05/10/24 0900    atorvastatin tablet 40 mg  40 mg Oral Daily Tanya Mancini, NP   40 mg at 05/10/24 0847    butalbital-acetaminophen-caffeine -40 mg per tablet 1 tablet  1 tablet Oral Q6H PRN Raj Yusuf MD   1 tablet at 05/09/24 1516    camphor-methyl salicyl-menthoL 4-30-10 % Crea   Topical (Top) TID PRN Raj Yusuf MD   Given at 05/08/24 1745    conjugated estrogens vaginal cream 1 g  1 g Vaginal Twice Weekly Raj Yusuf MD   1 g at 05/09/24 1004    ergocalciferol capsule 50,000 Units  50,000 Units Oral Q7 Days Raj Yusuf MD   50,000 Units at 05/09/24 1004    folic acid tablet 1 mg  1 mg Oral Daily Raj Yusuf MD   1 mg at 05/10/24 0847    gabapentin capsule 300 mg  300 mg Oral TID Sherri العراقي FNP   300 mg at 05/10/24 0847    liothyronine tablet 50 mcg  50 mcg Oral Daily Raj Yusuf MD   50 mcg at 05/10/24 0847    losartan tablet 25 mg  25 mg Oral BID Raj Yusuf MD   25 mg at 05/10/24 0847    melatonin tablet 6 mg  6 mg Oral Nightly PRN Bakari Brasher PA-C   6 mg at 05/07/24 1438    methocarbamoL tablet 500 mg  500 mg Oral Q4H PRN Raj Yusuf MD   500 mg at 05/09/24 1516    morphine injection 2 mg  2 mg Intravenous Q4H PRN Bakari Brasher PA-C   2 mg at 05/08/24 1005    ondansetron injection 4 mg  4 mg Intravenous Q8H PRN Bakari Brasher PA-C   4 mg at 05/08/24 1005    sodium chloride 0.9% flush 10 mL  10 mL Intravenous PRN Bakari Brasher PA-C        sodium chloride 0.9% flush 10 mL  10 mL Intravenous  Tanya Harp, ESTEPHANIA           Review of Systems  A 14pt ros was reviewed & is negative unless o/w documented in the hpi    Objective:     Vital Signs (Most Recent):  Temp: 98.2 °F (36.8 °C) (05/10/24 0736)  Pulse: 74 (05/10/24 0736)  Resp: 18 (05/10/24 0736)  BP: 111/78 (05/10/24 0736)  SpO2: 97 % (05/10/24 0736) Vital Signs (24h Range):  Temp:  [97.8 °F (36.6 °C)-98.4 °F (36.9 °C)] 98.2 °F (36.8 °C)  Pulse:  [62-94] 74  Resp:  [16-20] 18  SpO2:  [95 %-97 %] 97 %  BP: (108-124)/(75-88) 111/78     Weight: 131.5 kg (290 lb)  Body mass index is 42.83 kg/m².     Physical Exam   Vitals reviewed.   Constitutional:       General: She is awake.   HENT:      Head: Normocephalic and atraumatic.      Nose: Nose normal.      Mouth/Throat:      Mouth: Mucous membranes are moist.      Pharynx: Oropharynx is clear.   Eyes:      Extraocular Movements: Extraocular movements intact and EOM normal.      Pupils: Pupils are equal, round, and reactive to light.   Pulmonary:      Effort: Pulmonary effort is normal.   Skin:     General: Skin is dry.   Neurological:      Mental Status: She is alert and oriented to person, place, and time.   Psychiatric:         Mood and Affect: Mood normal.         Speech: Speech normal.         Behavior: Behavior is cooperative.         Thought Content: Thought content normal.         Judgment: Judgment normal.         NEUROLOGICAL EXAMINATION:      MENTAL STATUS   Oriented to person, place, and time.   Follows 3 step commands.   Attention: normal. Concentration: normal.   Speech: speech is normal   Level of consciousness: alert  Knowledge: good.      CRANIAL NERVES      CN II   Visual fields full to confrontation.      CN III, IV, VI   Pupils are equal, round, and reactive to light.  Extraocular motions are normal.   Nystagmus: none   Conjugate gaze: present     CN V   Facial sensation intact.      CN VII   Facial expression full, symmetric.      MOTOR EXAM   Right arm pronator drift: absent  Left  arm pronator drift: absent       BUE 5/5  LLE 5/5  RLE 3/5      SENSORY EXAM   Light touch normal.              Significant Labs:   Recent Lab Results       None            Significant Imaging: I have reviewed all pertinent imaging results/findings within the past 24 hours.  Assessment and Plan:     Migraine without aura  -on qulipta (alternates between qulipta, topiramate, and ubrelvy)  -plans for o/p occipital nerve block with Dr. Chou  -Further recommendations per MD    VTE Risk Mitigation (From admission, onward)           Ordered     IP VTE HIGH RISK PATIENT  Once         05/06/24 1710     Place sequential compression device  Until discontinued         05/06/24 1710     Place sequential compression device  Until discontinued         05/06/24 1508                    ANAMARIA Prater-BC  Inpatient Neurology  [facility] - 4th Floor Medical Telemetry

## 2024-05-10 NOTE — SUBJECTIVE & OBJECTIVE
Subjective:     Interval History:   Neurologic exam remains stable and unchanged, headache more tolerable.     Current Neurological Medications:     Current Facility-Administered Medications   Medication Dose Route Frequency Provider Last Rate Last Admin    acalabrutinib maleate Tab 100 mg  100 mg Oral BID Raj Yusuf MD   100 mg at 05/10/24 0847    acetaminophen tablet 650 mg  650 mg Oral Q8H PRN Bakari Brasher PA-C   650 mg at 05/09/24 0630    albuterol inhaler 2 puff  2 puff Inhalation Q6H PRN Raj Yusuf MD        aspirin EC tablet 81 mg  81 mg Oral Daily Tanya Mancini, NP   81 mg at 05/10/24 0847    atogepant Tab 60 mg  60 mg Oral Daily Raj Yusuf MD   60 mg at 05/10/24 0900    atorvastatin tablet 40 mg  40 mg Oral Daily Tanya Mancini, NP   40 mg at 05/10/24 0847    butalbital-acetaminophen-caffeine -40 mg per tablet 1 tablet  1 tablet Oral Q6H PRN Raj Yusuf MD   1 tablet at 05/09/24 1516    camphor-methyl salicyl-menthoL 4-30-10 % Crea   Topical (Top) TID PRN Raj Yusuf MD   Given at 05/08/24 1745    conjugated estrogens vaginal cream 1 g  1 g Vaginal Twice Weekly Raj Yusuf MD   1 g at 05/09/24 1004    ergocalciferol capsule 50,000 Units  50,000 Units Oral Q7 Days Raj Yusuf MD   50,000 Units at 05/09/24 1004    folic acid tablet 1 mg  1 mg Oral Daily Raj Yusuf MD   1 mg at 05/10/24 0847    gabapentin capsule 300 mg  300 mg Oral TID Sherri العراقي FNP   300 mg at 05/10/24 0847    liothyronine tablet 50 mcg  50 mcg Oral Daily Raj Yusuf MD   50 mcg at 05/10/24 0847    losartan tablet 25 mg  25 mg Oral BID Raj Yusuf MD   25 mg at 05/10/24 0847    melatonin tablet 6 mg  6 mg Oral Nightly PRN Bakari Brasher PA-C   6 mg at 05/07/24 1438    methocarbamoL tablet 500 mg  500 mg Oral Q4H PRN Raj Yusuf MD   500 mg at 05/09/24 1516    morphine injection 2 mg  2 mg Intravenous Q4H PRN Bakari Brasher PA-C   2 mg at  05/08/24 1005    ondansetron injection 4 mg  4 mg Intravenous Q8H PRN Bakari Brasher PA-C   4 mg at 05/08/24 1005    sodium chloride 0.9% flush 10 mL  10 mL Intravenous PRN Bakari Brasher PA-C        sodium chloride 0.9% flush 10 mL  10 mL Intravenous PRN Tanya Mancini, ESTEPHANIA           Review of Systems  A 14pt ros was reviewed & is negative unless o/w documented in the hpi    Objective:     Vital Signs (Most Recent):  Temp: 98.2 °F (36.8 °C) (05/10/24 0736)  Pulse: 74 (05/10/24 0736)  Resp: 18 (05/10/24 0736)  BP: 111/78 (05/10/24 0736)  SpO2: 97 % (05/10/24 0736) Vital Signs (24h Range):  Temp:  [97.8 °F (36.6 °C)-98.4 °F (36.9 °C)] 98.2 °F (36.8 °C)  Pulse:  [62-94] 74  Resp:  [16-20] 18  SpO2:  [95 %-97 %] 97 %  BP: (108-124)/(75-88) 111/78     Weight: 131.5 kg (290 lb)  Body mass index is 42.83 kg/m².     Physical Exam   Vitals reviewed.   Constitutional:       General: She is awake.   HENT:      Head: Normocephalic and atraumatic.      Nose: Nose normal.      Mouth/Throat:      Mouth: Mucous membranes are moist.      Pharynx: Oropharynx is clear.   Eyes:      Extraocular Movements: Extraocular movements intact and EOM normal.      Pupils: Pupils are equal, round, and reactive to light.   Pulmonary:      Effort: Pulmonary effort is normal.   Skin:     General: Skin is dry.   Neurological:      Mental Status: She is alert and oriented to person, place, and time.   Psychiatric:         Mood and Affect: Mood normal.         Speech: Speech normal.         Behavior: Behavior is cooperative.         Thought Content: Thought content normal.         Judgment: Judgment normal.         NEUROLOGICAL EXAMINATION:      MENTAL STATUS   Oriented to person, place, and time.   Follows 3 step commands.   Attention: normal. Concentration: normal.   Speech: speech is normal   Level of consciousness: alert  Knowledge: good.      CRANIAL NERVES      CN II   Visual fields full to confrontation.      CN III, IV, VI   Pupils  are equal, round, and reactive to light.  Extraocular motions are normal.   Nystagmus: none   Conjugate gaze: present     CN V   Facial sensation intact.      CN VII   Facial expression full, symmetric.      MOTOR EXAM   Right arm pronator drift: absent  Left arm pronator drift: absent       BUE 5/5  LLE 5/5  RLE 3/5      SENSORY EXAM   Light touch normal.              Significant Labs:   Recent Lab Results       None            Significant Imaging: I have reviewed all pertinent imaging results/findings within the past 24 hours.

## 2024-05-10 NOTE — PLAN OF CARE
Problem: Adult Inpatient Plan of Care  Goal: Plan of Care Review  Outcome: Progressing  Goal: Patient-Specific Goal (Individualized)  Outcome: Progressing  Goal: Absence of Hospital-Acquired Illness or Injury  Outcome: Progressing  Goal: Optimal Comfort and Wellbeing  Outcome: Progressing  Goal: Readiness for Transition of Care  Outcome: Progressing     Problem: Bariatric Environmental Safety  Goal: Safety Maintained with Care  Outcome: Progressing

## 2024-05-10 NOTE — PT/OT/SLP PROGRESS
Physical Therapy Treatment    Patient Name:  Cristina Lazar   MRN:  [id number]    Recommendations:     Discharge therapy intensity: Low Intensity Therapy   Discharge Equipment Recommendations: walker, rolling  Barriers to discharge: None    Assessment:     Cristina Lazar is a 48 y.o. female admitted with a medical diagnosis of  R-sided neck pain and RUE weakness. CT head negative for acute abnormality. CTA head/neck unrevealing for LVO, flow limiting stenosis, or aneurysms. MRI brain w/o unrevealing for acute infarct or any other intracranial abnormalities. .  She presents with the following impairments/functional limitations: weakness, impaired endurance, impaired self care skills, impaired functional mobility, gait instability, impaired balance, decreased coordination, decreased upper extremity function, decreased lower extremity function, decreased safety awareness, impaired fine motor.    Pt with significant improvement today with transfers and mobility. Pt able to transfer and ambulate with CGA-SBA with RW. Pt continues to advance RLE with limited to no knee flexion due to back pain with hip/knee flexion. Believe there is also an element of guarding and fear of moving here. Pt was able to ascend and descend 3 steps with handrail, CGA. Educated pt on technique.    Rehab Prognosis: Good; patient would benefit from acute skilled PT services to address these deficits and reach maximum level of function.    Recent Surgery: * No surgery found *      Plan:     During this hospitalization, patient would benefit from acute PT services 5 x/week to address the identified rehab impairments via gait training, therapeutic activities, therapeutic exercises, neuromuscular re-education and progress toward the following goals:    Plan of Care Expires:  06/07/24    Subjective     Chief Complaint: wanting to go home today  Patient/Family Comments/goals: to go home  Pain/Comfort:  Location - Side 1: Right  Location 1: back  Pain  Addressed 1: Reposition      Objective:     Communicated with nurse prior to session.  Patient found up in chair with   upon PT entry to room.     General Precautions: Standard, fall  Orthopedic Precautions: N/A  Braces: N/A  Respiratory Status: Room air      Functional Mobility:  Transfers:     Sit to Stand:  stand by assistance with rolling walker  Gait: 100ft very slow pace, RW and SBA.  Pt continues to advance RLE with limited to no knee flexion due to back pain with hip/knee flexion. Worked on tactile cueing to increase knee/hip flexion, but pt quickly returns back to her method after trying.   Stairs:  Pt ascended/descended 3 stair(s) with No Assistive Device with right handrail with Contact Guard Assistance.     Therapeutic Activities/Exercises:  Performed AAROM R knee and hip to tolerance. Pt c/o back/glut pain.     Education:  Patient provided with verbal education education regarding PT role/goals/POC, fall prevention, safety awareness, and discharge/DME recommendations.  Understanding was verbalized.     Patient left up in chair with all lines intact and call button in reach    GOALS:   Multidisciplinary Problems       Physical Therapy Goals          Problem: Physical Therapy    Goal Priority Disciplines Outcome Goal Variances Interventions   Physical Therapy Goal     PT, PT/OT Progressing     Description: Goals to be met by: 24     Patient will increase functional independence with mobility by performin. Supine to sit with Set-up [place]  2. Sit to supine with Set-up [place]  3. Sit to stand transfer with Stand-by Assistance  4. Bed to chair transfer with Stand-by Assistance using Rolling Walker  5. Gait  x 200 feet with Stand-by Assistance using Rolling Walker.   6. Ascend/descend 5 stairs with no Handrails & Stand-by Assistance                        Time Tracking:     PT Received On: 05/10/24  PT Start Time: 955     PT Stop Time: 5  PT Total Time (min): 30 min     Billable  Minutes: Gait Training 15 and Therapeutic Activity 15    Treatment Type: Treatment  PT/PTA: PT     Number of PTA visits since last PT visit: 3     05/10/2024

## 2024-05-12 ENCOUNTER — PATIENT MESSAGE (OUTPATIENT)
Dept: ADMINISTRATIVE | Facility: OTHER | Age: 49
End: 2024-05-12
Payer: COMMERCIAL

## 2024-05-13 ENCOUNTER — PATIENT OUTREACH (OUTPATIENT)
Dept: ADMINISTRATIVE | Facility: CLINIC | Age: 49
End: 2024-05-13
Payer: COMMERCIAL

## 2024-05-13 NOTE — PROGRESS NOTES
C3 nurse spoke with Cristina Lazar  for a TCC post hospital discharge follow up call. The patient has a scheduled HOSFU appointment with Raj Yusuf MD  on 05/17/2024. Dr. Jose Chou; stated walk in appointment for injection. Dr. Jasso May 05/23/2024 @ 730 am. Appointment with Dr. Bettencourt on 05/14/2024.

## 2024-05-14 ENCOUNTER — PATIENT MESSAGE (OUTPATIENT)
Dept: ADMINISTRATIVE | Facility: OTHER | Age: 49
End: 2024-05-14
Payer: COMMERCIAL

## 2024-05-16 ENCOUNTER — HOSPITAL ENCOUNTER (OUTPATIENT)
Dept: CARDIOLOGY | Facility: HOSPITAL | Age: 49
Discharge: HOME | End: 2024-05-16
Attending: PHYSICIAN ASSISTANT
Payer: COMMERCIAL

## 2024-05-16 DIAGNOSIS — G45.9 TIA (TRANSIENT ISCHEMIC ATTACK): ICD-10-CM

## 2024-05-16 PROCEDURE — 93880 EXTRACRANIAL BILAT STUDY: CPT

## 2024-05-16 PROCEDURE — 93880 EXTRACRANIAL BILAT STUDY: CPT | Mod: 26,,, | Performed by: SURGERY

## 2024-05-17 LAB
LEFT CCA DIST DIAS: 14 CM/S
LEFT CCA DIST SYS: 59 CM/S
LEFT CCA PROX DIAS: 14 CM/S
LEFT CCA PROX SYS: 62 CM/S
LEFT ECA DIAS: 10 CM/S
LEFT ECA SYS: 50 CM/S
LEFT ICA DIST DIAS: 22 CM/S
LEFT ICA DIST SYS: 59 CM/S
LEFT ICA MID DIAS: 17 CM/S
LEFT ICA MID SYS: 44 CM/S
LEFT ICA PROX DIAS: 10 CM/S
LEFT ICA PROX SYS: 50 CM/S
LEFT VERTEBRAL DIAS: 9 CM/S
LEFT VERTEBRAL SYS: 35 CM/S
OHS CV CAROTID RIGHT ICA EDV HIGHEST: 20
OHS CV CAROTID ULTRASOUND LEFT ICA/CCA RATIO: 1
OHS CV CAROTID ULTRASOUND RIGHT ICA/CCA RATIO: 0.69
OHS CV PV CAROTID LEFT HIGHEST CCA: 62
OHS CV PV CAROTID LEFT HIGHEST ICA: 59
OHS CV PV CAROTID RIGHT HIGHEST CCA: 71
OHS CV PV CAROTID RIGHT HIGHEST ICA: 49
OHS CV US CAROTID LEFT HIGHEST EDV: 22
RIGHT CCA DIST DIAS: 17 CM/S
RIGHT CCA DIST SYS: 71 CM/S
RIGHT CCA PROX DIAS: 13 CM/S
RIGHT CCA PROX SYS: 69 CM/S
RIGHT ECA DIAS: 7 CM/S
RIGHT ECA SYS: 46 CM/S
RIGHT ICA DIST DIAS: 20 CM/S
RIGHT ICA DIST SYS: 49 CM/S
RIGHT ICA MID DIAS: 17 CM/S
RIGHT ICA MID SYS: 43 CM/S
RIGHT ICA PROX DIAS: 10 CM/S
RIGHT ICA PROX SYS: 46 CM/S
RIGHT VERTEBRAL DIAS: 8 CM/S
RIGHT VERTEBRAL SYS: 32 CM/S

## 2024-06-10 NOTE — DISCHARGE SUMMARY
[facility]                       [address] [address]    PATIENT NAME:       TOYA PERRY  YOB: 1975  CSN:                [id number]   MRN:                [id number]  ADMIT DATE:         05/06/2024 11:28:00  PHYSICIAN:          Raj Yusuf MD                          DISCHARGE SUMMARY    DATE OF DISCHARGE:  05/10/2024 17:35:00    HISTORY OF PRESENT ILLNESS:  This is a 48-year-old  female, well   known to me.  She started to present with chest pains that were atypical in   most features, but though some typical features.  She had some right-sided   tingling and numbness.  She is undergoing chemotherapy for CLL.  She denies any   fever or chills.  No palpitations, but she did have some shortness of breath and   she has some blurred vision.  She was admitted and she has some right arm and   right leg weakness to rule out TIA versus CVA versus others.  She was seen by   Neuro.  She did have some therapy with PT, OT.  Cardiology evaluated the patient   and thought that this was a typical.  She did not have any elevated troponins.    Neurology saw the patient for some headaches.  She did well.  She was   discharged in good and stable condition on the 10th of May.    FINAL DIAGNOSES:  Include:  1. Acute neuro deficit, possible occipital neuralgia and neurological deficit,   not fully understood .  2. History of chronic migraines and possible occipital neuralgia in the right   side may be a typical migraine.  3. Hypertension.  4. Dyslipidemia.  5. Diabetes mellitus type 2.  6. Chronic lymphocytic leukemia.  7. She has a history of vitamin D deficiency.  8. Sleep apnea.  9. Significant obesity.  10. History of migraines.  11. Thyroid disease.  12. Hypothyroidism.  13. She has diastolic dysfunction, valvular disease with mitral regurgitation,   tricuspid regurgitation, aortic stenosis,  mitral regurgitation.  Last known   echo, EF 65 to 70 in 2017.  14. Back pain with radiculopathy, carpal tunnel.  15. Surgical malabsorption, status post gastric bypass in 2017.    The recent echo showed EF 60% to 65%, mild mitral regurg, mild tricuspid regurg,   normal diastolic function.  Mild tricuspid regurgitation, trace mitral regurg.    Negative bubble study.  MRI showed chronic age-related changes.  CTA melanoma   plaque in the right proximal internal carotid, mild aneurysmal dilation in the   ascending aorta.  White cell 29.38, H and H 12.4, 38.6, platelets 251.  BUN   11.8, creatinine 0.72, cholesterol 174, HDL 48, .  Troponin less than   0.01.  Hemoglobin A1c 5.1.  The patient will follow up in 1-2 weeks.  The   patient will continue with folic acid, vitamin D, albuterol p.r.n., Norco 7.5,   Folvite 1 mg, Premarin vaginal cream, Topamax, carvedilol, clonidine,   hydralazine, Synthroid, metformin, Carafate, and aspirin.        ______________________________  MD JOSE Sanchez/ROSALIE  DD:  06/10/2024  Time:  08:09AM  DT:  06/10/2024  Time:  06:11PM  Job #:  [id number]      DISCHARGE SUMMARY

## 2024-09-01 ENCOUNTER — HOSPITAL ENCOUNTER (EMERGENCY)
Facility: HOSPITAL | Age: 49
Discharge: HOME | End: 2024-09-01
Attending: EMERGENCY MEDICINE
Payer: COMMERCIAL

## 2024-09-01 VITALS
WEIGHT: 290 LBS | BODY MASS INDEX: 41.52 KG/M2 | SYSTOLIC BLOOD PRESSURE: 136 MMHG | DIASTOLIC BLOOD PRESSURE: 94 MMHG | HEART RATE: 62 BPM | RESPIRATION RATE: 20 BRPM | TEMPERATURE: 98 F | OXYGEN SATURATION: 98 % | HEIGHT: 70 IN
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
## 2024-09-01 DIAGNOSIS — M54.50 ACUTE BILATERAL LOW BACK PAIN WITHOUT SCIATICA: ICD-10-CM

## 2024-09-01 PROCEDURE — 99283 EMERGENCY DEPT VISIT LOW MDM: CPT

## 2024-09-01 RX ORDER — TIZANIDINE 4 MG/1
4 TABLET ORAL EVERY 8 HOURS PRN
Qty: 12 TABLET | Refills: 0 | Status: SHIPPED | OUTPATIENT
Start: 2024-09-01

## 2024-09-01 RX ORDER — TIZANIDINE 4 MG/1
4 TABLET ORAL EVERY 8 HOURS PRN
Qty: 12 TABLET | Refills: 0 | Status: SHIPPED | OUTPATIENT
Start: 2024-09-01 | End: 2024-09-01
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
## 2024-09-12 ENCOUNTER — LAB VISIT (OUTPATIENT)
Dept: LAB | Facility: HOSPITAL | Age: 49
End: 2024-09-12
Attending: INTERNAL MEDICINE
Payer: COMMERCIAL

## 2024-09-12 DIAGNOSIS — I10 ESSENTIAL HYPERTENSION, MALIGNANT: ICD-10-CM

## 2024-09-12 DIAGNOSIS — E78.5 HYPERLIPIDEMIA, UNSPECIFIED HYPERLIPIDEMIA TYPE: ICD-10-CM

## 2024-09-12 DIAGNOSIS — M10.9 GOUT, UNSPECIFIED CAUSE, UNSPECIFIED CHRONICITY, UNSPECIFIED SITE: ICD-10-CM

## 2024-09-12 DIAGNOSIS — E55.9 AVITAMINOSIS D: ICD-10-CM

## 2024-09-12 DIAGNOSIS — I51.9 MYXEDEMA HEART DISEASE: ICD-10-CM

## 2024-09-12 DIAGNOSIS — R79.9 ABNORMAL BLOOD CHEMISTRY: Primary | ICD-10-CM

## 2024-09-12 DIAGNOSIS — Z79.899 ENCOUNTER FOR LONG-TERM (CURRENT) USE OF OTHER MEDICATIONS: ICD-10-CM

## 2024-09-12 DIAGNOSIS — E03.9 MYXEDEMA HEART DISEASE: ICD-10-CM

## 2024-09-12 DIAGNOSIS — K91.2 HYPOGLYCEMIA FOLLOWING GASTROINTESTINAL SURGERY: ICD-10-CM

## 2024-09-12 DIAGNOSIS — D64.9 ANEMIA, UNSPECIFIED TYPE: ICD-10-CM

## 2024-09-12 DIAGNOSIS — E11.9 DIABETES MELLITUS WITHOUT COMPLICATION: ICD-10-CM

## 2024-09-12 LAB
25(OH)D3+25(OH)D2 SERPL-MCNC: 28 NG/ML (ref 30–80)
AMPHET UR QL SCN: NEGATIVE
BARBITURATE SCN PRESENT UR: NEGATIVE
BENZODIAZ UR QL SCN: NEGATIVE
CANNABINOIDS UR QL SCN: NEGATIVE
CHOLEST SERPL-MCNC: 192 MG/DL
CHOLEST/HDLC SERPL: 3 {RATIO} (ref 0–5)
COCAINE UR QL SCN: NEGATIVE
CORTIS SERPL-SCNC: 11.6 UG/DL
CREAT UR-MCNC: 254.7 MG/DL (ref 45–106)
EST. AVERAGE GLUCOSE BLD GHB EST-MCNC: 102.5 MG/DL
FENTANYL UR QL SCN: NEGATIVE
FERRITIN SERPL-MCNC: 37.38 NG/ML (ref 4.63–204)
FOLATE SERPL-MCNC: 8 NG/ML (ref 7–31.4)
HBA1C MFR BLD: 5.2 %
HDLC SERPL-MCNC: 62 MG/DL (ref 35–60)
IRON SATN MFR SERPL: 24 % (ref 20–50)
IRON SERPL-MCNC: 81 UG/DL (ref 50–170)
LDLC SERPL CALC-MCNC: 110 MG/DL (ref 50–140)
MAGNESIUM SERPL-MCNC: 2 MG/DL (ref 1.6–2.6)
MDMA UR QL SCN: NEGATIVE
MICROALBUMIN UR-MCNC: 16.6 UG/ML
MICROALBUMIN/CREAT RATIO PNL UR: 6.5 MG/GM CR (ref 0–30)
OPIATES UR QL SCN: NEGATIVE
PCP UR QL: NEGATIVE
PH UR: 5.5 [PH] (ref 3–11)
PHOSPHATE SERPL-MCNC: 3.7 MG/DL (ref 2.3–4.7)
PREALB SERPL-MCNC: 29.4 MG/DL (ref 16–38)
SPECIFIC GRAVITY, URINE AUTO (.000) (OHS): 1.03 (ref 1–1.03)
T3FREE SERPL-MCNC: 2.31 PG/ML (ref 1.58–3.91)
T4 FREE SERPL-MCNC: 0.75 NG/DL (ref 0.7–1.48)
TIBC SERPL-MCNC: 261 UG/DL (ref 70–310)
TIBC SERPL-MCNC: 342 UG/DL (ref 250–450)
TRANSFERRIN SERPL-MCNC: 321 MG/DL (ref 180–382)
TRIGL SERPL-MCNC: 102 MG/DL (ref 37–140)
TSH SERPL-ACNC: 2.21 UIU/ML (ref 0.35–4.94)
URATE SERPL-MCNC: 6.5 MG/DL (ref 2.6–6)
VIT B12 SERPL-MCNC: 434 PG/ML (ref 213–816)
VLDLC SERPL CALC-MCNC: 20 MG/DL

## 2024-09-12 PROCEDURE — 84134 ASSAY OF PREALBUMIN: CPT

## 2024-09-12 PROCEDURE — 82570 ASSAY OF URINE CREATININE: CPT

## 2024-09-12 PROCEDURE — 80307 DRUG TEST PRSMV CHEM ANLYZR: CPT

## 2024-09-12 PROCEDURE — 84443 ASSAY THYROID STIM HORMONE: CPT

## 2024-09-12 PROCEDURE — 82525 ASSAY OF COPPER: CPT

## 2024-09-12 PROCEDURE — 82728 ASSAY OF FERRITIN: CPT

## 2024-09-12 PROCEDURE — 82746 ASSAY OF FOLIC ACID SERUM: CPT

## 2024-09-12 PROCEDURE — 82533 TOTAL CORTISOL: CPT

## 2024-09-12 PROCEDURE — 36415 COLL VENOUS BLD VENIPUNCTURE: CPT

## 2024-09-12 PROCEDURE — 80061 LIPID PANEL: CPT

## 2024-09-12 PROCEDURE — 84481 FREE ASSAY (FT-3): CPT

## 2024-09-12 PROCEDURE — 84630 ASSAY OF ZINC: CPT

## 2024-09-12 PROCEDURE — 82043 UR ALBUMIN QUANTITATIVE: CPT

## 2024-09-12 PROCEDURE — 84100 ASSAY OF PHOSPHORUS: CPT

## 2024-09-12 PROCEDURE — 83036 HEMOGLOBIN GLYCOSYLATED A1C: CPT

## 2024-09-12 PROCEDURE — 84550 ASSAY OF BLOOD/URIC ACID: CPT

## 2024-09-12 PROCEDURE — 84439 ASSAY OF FREE THYROXINE: CPT

## 2024-09-12 PROCEDURE — 84446 ASSAY OF VITAMIN E: CPT

## 2024-09-12 PROCEDURE — 83735 ASSAY OF MAGNESIUM: CPT

## 2024-09-12 PROCEDURE — 82306 VITAMIN D 25 HYDROXY: CPT

## 2024-09-12 PROCEDURE — 83540 ASSAY OF IRON: CPT

## 2024-09-12 PROCEDURE — 84597 ASSAY OF VITAMIN K: CPT

## 2024-09-12 PROCEDURE — 84590 ASSAY OF VITAMIN A: CPT

## 2024-09-12 PROCEDURE — 83550 IRON BINDING TEST: CPT

## 2024-09-12 PROCEDURE — 82607 VITAMIN B-12: CPT

## 2024-09-12 PROCEDURE — 84207 ASSAY OF VITAMIN B-6: CPT

## 2024-09-13 LAB
COPPER SERPL-MCNC: 128 MCG/DL (ref 77–206)
ZINC SERPL-MCNC: 77 MCG/DL (ref 60–106)

## 2024-09-16 LAB
PYRIDOXAL PHOS SERPL-MCNC: 5 MCG/L (ref 5–50)
VIT A SERPL-MCNC: 43.9 MCG/DL (ref 32.5–78)

## 2024-09-17 LAB
A-TOCOPHEROL VIT E SERPL-MCNC: 7.4 MG/L (ref 5.5–17)
MAYO GENERIC ORDERABLE RESULT: ABNORMAL

## 2024-09-30 ENCOUNTER — HOSPITAL ENCOUNTER (EMERGENCY)
Facility: HOSPITAL | Age: 49
Discharge: HOME | End: 2024-09-30
Attending: EMERGENCY MEDICINE
Payer: COMMERCIAL

## 2024-09-30 VITALS
SYSTOLIC BLOOD PRESSURE: 160 MMHG | DIASTOLIC BLOOD PRESSURE: 112 MMHG | HEIGHT: 69 IN | BODY MASS INDEX: 41.47 KG/M2 | OXYGEN SATURATION: 95 % | TEMPERATURE: 98 F | HEART RATE: 69 BPM | RESPIRATION RATE: 18 BRPM | WEIGHT: 280 LBS

## 2024-09-30 DIAGNOSIS — G44.209 ACUTE NON INTRACTABLE TENSION-TYPE HEADACHE: ICD-10-CM

## 2024-09-30 DIAGNOSIS — M62.838 CERVICAL PARASPINAL MUSCLE SPASM: Primary | ICD-10-CM

## 2024-09-30 PROCEDURE — 96374 THER/PROPH/DIAG INJ IV PUSH: CPT

## 2024-09-30 PROCEDURE — 99285 EMERGENCY DEPT VISIT HI MDM: CPT | Mod: 25

## 2024-09-30 PROCEDURE — 63600175 PHARM REV CODE 636 W HCPCS: Performed by: EMERGENCY MEDICINE

## 2024-09-30 PROCEDURE — 96375 TX/PRO/DX INJ NEW DRUG ADDON: CPT

## 2024-09-30 RX ORDER — OXYCODONE AND ACETAMINOPHEN 10; 325 MG/1; MG/1
1 TABLET ORAL EVERY 6 HOURS PRN
Qty: 12 TABLET | Refills: 0 | Status: SHIPPED | OUTPATIENT
Start: 2024-09-30 | End: 2024-10-03

## 2024-09-30 RX ORDER — KETOROLAC TROMETHAMINE 30 MG/ML
30 INJECTION, SOLUTION INTRAMUSCULAR; INTRAVENOUS
Status: COMPLETED | OUTPATIENT
Start: 2024-09-30 | End: 2024-09-30

## 2024-09-30 RX ORDER — CYCLOBENZAPRINE HCL 10 MG
10 TABLET ORAL 3 TIMES DAILY PRN
Qty: 15 TABLET | Refills: 0 | Status: SHIPPED | OUTPATIENT
Start: 2024-09-30 | End: 2024-10-05

## 2024-09-30 RX ORDER — DEXAMETHASONE SODIUM PHOSPHATE 4 MG/ML
8 INJECTION, SOLUTION INTRA-ARTICULAR; INTRALESIONAL; INTRAMUSCULAR; INTRAVENOUS; SOFT TISSUE
Status: COMPLETED | OUTPATIENT
Start: 2024-09-30 | End: 2024-09-30

## 2024-09-30 RX ORDER — KETOROLAC TROMETHAMINE 10 MG/1
10 TABLET, FILM COATED ORAL EVERY 6 HOURS
Qty: 20 TABLET | Refills: 0 | Status: SHIPPED | OUTPATIENT
Start: 2024-09-30 | End: 2024-10-05

## 2024-09-30 RX ORDER — FENTANYL CITRATE 50 UG/ML
100 INJECTION, SOLUTION INTRAMUSCULAR; INTRAVENOUS
Status: COMPLETED | OUTPATIENT
Start: 2024-09-30 | End: 2024-09-30

## 2024-09-30 RX ORDER — ORPHENADRINE CITRATE 30 MG/ML
60 INJECTION INTRAMUSCULAR; INTRAVENOUS
Status: COMPLETED | OUTPATIENT
Start: 2024-09-30 | End: 2024-09-30

## 2024-09-30 RX ADMIN — ORPHENADRINE CITRATE 60 MG: 60 INJECTION INTRAMUSCULAR; INTRAVENOUS at 04:09

## 2024-09-30 RX ADMIN — FENTANYL CITRATE 100 MCG: 50 INJECTION INTRAMUSCULAR; INTRAVENOUS at 04:09

## 2024-09-30 RX ADMIN — KETOROLAC TROMETHAMINE 30 MG: 30 INJECTION, SOLUTION INTRAMUSCULAR at 04:09

## 2024-09-30 RX ADMIN — DEXAMETHASONE SODIUM PHOSPHATE 8 MG: 4 INJECTION, SOLUTION INTRA-ARTICULAR; INTRALESIONAL; INTRAMUSCULAR; INTRAVENOUS; SOFT TISSUE at 04:09

## 2024-09-30 NOTE — ED PROVIDER NOTES
"Encounter Date: 9/30/2024       History     Chief Complaint   Patient presents with    Headache     Pt complaint of sudden onset of headache and neck pain when moving neck pta with "ears ringing"     The history is provided by the patient.   Headache   This is a new problem. The current episode started today. The problem occurs constantly. The problem has been unchanged. The pain is located in the Occipital (posterior neck, bilateral) region. Radiates to: vertex of scalp. The quality of the pain is described as sharp and shooting. Associated symptoms include neck pain. Pertinent negatives include no back pain, fever, nausea, numbness, sore throat or weakness. Exacerbated by: turning head either direction. She has tried nothing for the symptoms. Her past medical history is significant for cancer and hypertension.   Patient turned head while sitting watching TV and felt sharp, sudden pain in posterior neck that radiates into her occipital scalp.  Worse with head movement.  No prior history.    Review of patient's allergies indicates:  No Known Allergies  Past Medical History:   Diagnosis Date    Arthritis     CLL (chronic lymphocytic leukemia)     Diabetes mellitus     Hypertension     Migraines     Sleep apnea     cpap    Thyroid disease     states recently instructed she could stop thyroid med.     Past Surgical History:   Procedure Laterality Date    BIOPSY OF AXILLARY NODE Left 8/18/2023    Procedure: BIOPSY, LYMPH NODE, AXILLARY;  Surgeon: Sean Styles MD;  Location: [facility] OR;  Service: General;  Laterality: Left;    CARPAL TUNNEL RELEASE Right     CHOLECYSTECTOMY      HYSTERECTOMY      KNEE LIGAMENT RECONSTRUCTION Right     LYMPH NODE BIOPSY Left 8/18/2023    Procedure: BIOPSY, LYMPH NODE Left  Axilla 2cm;  Surgeon: Sean Styles MD;  Location: [facility] OR;  Service: General;  Laterality: Left;    DIANA-EN-Y PROCEDURE      rt foot Right     for neuropathy    TONSILLECTOMY AND ADENOIDECTOMY      TUBAL " LIGATION       Family History   Problem Relation Name Age of Onset    Diabetes Mother      Heart failure Father       Social History     Tobacco Use    Smoking status: Never    Smokeless tobacco: Never   Substance Use Topics    Alcohol use: Not Currently     Comment: wine socially    Drug use: Never     Review of Systems   Constitutional:  Negative for fever.   HENT:  Negative for sore throat.    Respiratory:  Negative for shortness of breath.    Cardiovascular:  Negative for chest pain.   Gastrointestinal:  Negative for nausea.   Genitourinary:  Negative for dysuria.   Musculoskeletal:  Positive for neck pain. Negative for back pain.   Skin:  Negative for rash.   Neurological:  Positive for headaches. Negative for weakness and numbness.   Hematological:  Does not bruise/bleed easily.       Physical Exam     Initial Vitals [09/30/24 1543]   BP Pulse Resp Temp SpO2   (!) 160/112 69 18 98.1 °F (36.7 °C) 95 %      MAP       --         Physical Exam    Nursing note and vitals reviewed.  Constitutional: She appears well-developed and well-nourished.   HENT:   Head: Normocephalic and atraumatic.   Right Ear: External ear normal.   Left Ear: External ear normal.   Eyes: Conjunctivae and EOM are normal. Pupils are equal, round, and reactive to light.   Neck: Neck supple.   Normal range of motion.  Cardiovascular:  Normal rate, regular rhythm, normal heart sounds and intact distal pulses.           Pulmonary/Chest: Breath sounds normal.   Abdominal: Abdomen is soft. Bowel sounds are normal.   Musculoskeletal:         General: Normal range of motion.      Cervical back: Normal range of motion and neck supple.     Neurological: She is alert and oriented to person, place, and time. GCS score is 15. GCS eye subscore is 4. GCS verbal subscore is 5. GCS motor subscore is 6.   Skin: Skin is warm and dry. Capillary refill takes less than 2 seconds.   Psychiatric: She has a normal mood and affect. Her behavior is normal. Judgment and  thought content normal.         ED Course   Procedures  Labs Reviewed - No data to display       Imaging Results              CT Cervical Spine Without Contrast (Final result)  Result time 09/30/24 16:46:15      Final result by Sintia Gallagher MD (09/30/24 16:46:15)                   Impression:      No acute fracture identified.      Electronically signed by: Sintia Gallagher  Date:    09/30/2024  Time:    16:46               Narrative:    EXAMINATION:  CT CERVICAL SPINE WITHOUT CONTRAST    CLINICAL HISTORY:  Neck pain, acute, no red flags;    TECHNIQUE:  Noncontrast CT images of the cervical spine. Axial, coronal, and sagittal reformatted images were obtained. Dose length product is 1657 mGycm. Automatic exposure control, adjustment of mA/kV or iterative reconstruction technique was used to limit radiation dose.    COMPARISON:  MRI cervical spine dated 05/09/2024    FINDINGS:  The cervical spine is visualized through the level of T1.    There is no acute fracture identified.  There is reversal of normal cervical lordosis.  There is no paraspinal hematoma.                                       CT Head Without Contrast (Final result)  Result time 09/30/24 16:48:03      Final result by Drew Davenport MD (09/30/24 16:48:03)                   Impression:      No acute intracranial process identified.      Electronically signed by: Drew Davenport  Date:    09/30/2024  Time:    16:48               Narrative:    EXAMINATION:  CT HEAD WITHOUT CONTRAST    CLINICAL HISTORY:  Headache, sudden, severe;    TECHNIQUE:  CT images of the head without IV contrast. Axial, coronal and sagittal images reviewed. Dose length product 1657 mGycm. Automatic exposure control, adjustment of mA/kV or iterative reconstruction technique used to limit radiation dose.    COMPARISON:  CT 05/06/2024    FINDINGS:  Extra-axial spaces/ventricular system: Normal for age.    Intracranial hemorrhage: None identified.    Cerebral parenchyma: No  acute large vessel territory infarct or mass effect identified.    Vascular system: No hyperdense vessel appreciated.    Cerebellum: Normal.    Sella: Normal.    Included paranasal sinuses and mastoid air cells: Well-aerated.    Visualized orbits: Normal.    Scalp/Calvarium: No depressed skull fracture.                                       Medications   fentaNYL injection 100 mcg (100 mcg Intravenous Given 9/30/24 1633)   ketorolac injection 30 mg (30 mg Intravenous Given 9/30/24 1632)   orphenadrine injection 60 mg (60 mg Intravenous Given 9/30/24 1632)   dexAMETHasone injection 8 mg (8 mg Intravenous Given 9/30/24 1632)     Medical Decision Making  Amount and/or Complexity of Data Reviewed  Radiology: ordered. Decision-making details documented in ED Course.    Risk  Prescription drug management.  Parenteral controlled substances.    Differential includes:  spasm, herniated disc, dissection, tension headache, jumped facet, fracture.  Will give IV analgesics and obtain CT head/C-spine.           ED Course as of 09/30/24 1713   Mon Sep 30, 2024   1705 Feeling somewhat better, but still with pain.  Discussed CT results.  Will D/C with symptomatic treatment. [CL]      ED Course User Index  [CL] Abrahan Be MD                           Clinical Impression:  Final diagnoses:  [M62.838] Cervical paraspinal muscle spasm (Primary)  [G44.209] Acute non intractable tension-type headache          ED Disposition Condition    Discharge Stable          ED Prescriptions       Medication Sig Dispense Start Date End Date Auth. Provider    ketorolac (TORADOL) 10 mg tablet Take 1 tablet (10 mg total) by mouth every 6 (six) hours. for 5 days 20 tablet 9/30/2024 10/5/2024 Abrahan Be MD    cyclobenzaprine (FLEXERIL) 10 MG tablet Take 1 tablet (10 mg total) by mouth 3 (three) times daily as needed for Muscle spasms. 15 tablet 9/30/2024 10/5/2024 Abrhaan Be MD    oxyCODONE-acetaminophen (PERCOCET)   mg per tablet Take 1 tablet by mouth every 6 (six) hours as needed for Pain. Final diagnoses: [M62.838] Cervical paraspinal muscle spasm (Primary) [G44.209] Acute non intractable tension-type headache 12 tablet 9/30/2024 10/3/2024 Abrahan Be MD          Follow-up Information       Follow up With Specialties Details Why Contact Info    Raj Yusuf MD Internal Medicine Schedule an appointment as soon as possible for a visit   [address] [place] [address]  [address]  [facility] [address]  [phone]               Abrahan Be MD  09/30/24 1174

## 2024-09-30 NOTE — ED TRIAGE NOTES
"  Pt complaint of sudden onset of headache and neck pain when moving neck pta with "ears ringing"  "

## 2024-12-10 ENCOUNTER — HOSPITAL ENCOUNTER (EMERGENCY)
Facility: HOSPITAL | Age: 49
Discharge: HOME | End: 2024-12-10
Attending: EMERGENCY MEDICINE
Payer: COMMERCIAL

## 2024-12-10 VITALS
RESPIRATION RATE: 15 BRPM | DIASTOLIC BLOOD PRESSURE: 82 MMHG | HEIGHT: 69 IN | SYSTOLIC BLOOD PRESSURE: 129 MMHG | BODY MASS INDEX: 43.1 KG/M2 | HEART RATE: 63 BPM | TEMPERATURE: 99 F | WEIGHT: 291 LBS | OXYGEN SATURATION: 94 %

## 2024-12-10 DIAGNOSIS — D72.829 LEUKOCYTOSIS, UNSPECIFIED TYPE: ICD-10-CM

## 2024-12-10 DIAGNOSIS — R07.89 CHEST WALL PAIN: Primary | ICD-10-CM

## 2024-12-10 DIAGNOSIS — R06.02 SHORTNESS OF BREATH: ICD-10-CM

## 2024-12-10 DIAGNOSIS — R07.89 ATYPICAL CHEST PAIN: ICD-10-CM

## 2024-12-10 LAB
ABS NEUT (OLG): 6.18 X10(3)/MCL (ref 2.1–9.2)
ALBUMIN SERPL-MCNC: 3.8 G/DL (ref 3.5–5)
ALBUMIN/GLOB SERPL: 1 RATIO (ref 1.1–2)
ALP SERPL-CCNC: 78 UNIT/L (ref 40–150)
ALT SERPL-CCNC: 36 UNIT/L (ref 0–55)
ANION GAP SERPL CALC-SCNC: 8 MEQ/L
AST SERPL-CCNC: 26 UNIT/L (ref 5–34)
BACTERIA #/AREA URNS AUTO: ABNORMAL /HPF
BILIRUB SERPL-MCNC: 0.8 MG/DL
BILIRUB UR QL STRIP.AUTO: NEGATIVE
BNP BLD-MCNC: <10 PG/ML
BUN SERPL-MCNC: 9 MG/DL (ref 9.8–20.1)
CALCIUM SERPL-MCNC: 8.9 MG/DL (ref 8.4–10.2)
CHLORIDE SERPL-SCNC: 106 MMOL/L (ref 98–107)
CLARITY UR: CLEAR
CO2 SERPL-SCNC: 28 MMOL/L (ref 22–29)
COLOR UR AUTO: ABNORMAL
CREAT SERPL-MCNC: 0.72 MG/DL (ref 0.55–1.02)
CREAT/UREA NIT SERPL: 13
EOSINOPHIL NFR BLD MANUAL: 0.26 X10(3)/MCL (ref 0–0.9)
EOSINOPHIL NFR BLD MANUAL: 1 %
ERYTHROCYTE [DISTWIDTH] IN BLOOD BY AUTOMATED COUNT: 14.5 % (ref 11.5–17)
GFR SERPLBLD CREATININE-BSD FMLA CKD-EPI: >60 ML/MIN/1.73/M2
GLOBULIN SER-MCNC: 3.7 GM/DL (ref 2.4–3.5)
GLUCOSE SERPL-MCNC: 69 MG/DL (ref 74–100)
GLUCOSE UR QL STRIP: NORMAL
HCT VFR BLD AUTO: 38.8 % (ref 37–47)
HEMATOLOGIST REVIEW: NORMAL
HGB BLD-MCNC: 12.6 G/DL (ref 12–16)
HGB UR QL STRIP: NEGATIVE
INSTRUMENT WBC (OLG): 25.77 X10(3)/MCL
KETONES UR QL STRIP: NEGATIVE
LEUKOCYTE ESTERASE UR QL STRIP: 75
LYMPHOCYTES NFR BLD MANUAL: 17.27 X10(3)/MCL
LYMPHOCYTES NFR BLD MANUAL: 67 %
MCH RBC QN AUTO: 28 PG (ref 27–31)
MCHC RBC AUTO-ENTMCNC: 32.5 G/DL (ref 33–36)
MCV RBC AUTO: 86.2 FL (ref 80–94)
MONOCYTES NFR BLD MANUAL: 2.06 X10(3)/MCL (ref 0.1–1.3)
MONOCYTES NFR BLD MANUAL: 8 %
MUCOUS THREADS URNS QL MICRO: ABNORMAL /LPF
NEUTROPHILS NFR BLD MANUAL: 24 %
NITRITE UR QL STRIP: NEGATIVE
NRBC BLD AUTO-RTO: 0 %
OHS QRS DURATION: 92 MS
OHS QTC CALCULATION: 464 MS
PH UR STRIP: 6.5 [PH]
PLATELET # BLD AUTO: 210 X10(3)/MCL (ref 130–400)
PLATELET # BLD EST: NORMAL 10*3/UL
PMV BLD AUTO: 10.7 FL (ref 7.4–10.4)
POCT GLUCOSE: 82 MG/DL (ref 70–110)
POTASSIUM SERPL-SCNC: 4.1 MMOL/L (ref 3.5–5.1)
PROT SERPL-MCNC: 7.5 GM/DL (ref 6.4–8.3)
PROT UR QL STRIP: NEGATIVE
RBC # BLD AUTO: 4.5 X10(6)/MCL (ref 4.2–5.4)
RBC #/AREA URNS AUTO: ABNORMAL /HPF
RBC MORPH BLD: NORMAL
SODIUM SERPL-SCNC: 142 MMOL/L (ref 136–145)
SP GR UR STRIP.AUTO: 1.02 (ref 1–1.03)
SQUAMOUS #/AREA URNS LPF: ABNORMAL /HPF
TROPONIN I SERPL-MCNC: <0.01 NG/ML (ref 0–0.04)
UROBILINOGEN UR STRIP-ACNC: NORMAL
WBC # BLD AUTO: 25.77 X10(3)/MCL (ref 4.5–11.5)
WBC #/AREA URNS AUTO: ABNORMAL /HPF

## 2024-12-10 PROCEDURE — 96376 TX/PRO/DX INJ SAME DRUG ADON: CPT

## 2024-12-10 PROCEDURE — 81001 URINALYSIS AUTO W/SCOPE: CPT | Performed by: PHYSICIAN ASSISTANT

## 2024-12-10 PROCEDURE — 84484 ASSAY OF TROPONIN QUANT: CPT | Performed by: PHYSICIAN ASSISTANT

## 2024-12-10 PROCEDURE — 63600175 PHARM REV CODE 636 W HCPCS: Performed by: EMERGENCY MEDICINE

## 2024-12-10 PROCEDURE — 93005 ELECTROCARDIOGRAM TRACING: CPT

## 2024-12-10 PROCEDURE — 25500020 PHARM REV CODE 255: Performed by: EMERGENCY MEDICINE

## 2024-12-10 PROCEDURE — 82962 GLUCOSE BLOOD TEST: CPT

## 2024-12-10 PROCEDURE — 96374 THER/PROPH/DIAG INJ IV PUSH: CPT

## 2024-12-10 PROCEDURE — 99285 EMERGENCY DEPT VISIT HI MDM: CPT | Mod: 25

## 2024-12-10 PROCEDURE — 85025 COMPLETE CBC W/AUTO DIFF WBC: CPT | Performed by: PHYSICIAN ASSISTANT

## 2024-12-10 PROCEDURE — 93010 ELECTROCARDIOGRAM REPORT: CPT | Mod: ,,, | Performed by: INTERNAL MEDICINE

## 2024-12-10 PROCEDURE — 96375 TX/PRO/DX INJ NEW DRUG ADDON: CPT

## 2024-12-10 PROCEDURE — 80053 COMPREHEN METABOLIC PANEL: CPT | Performed by: PHYSICIAN ASSISTANT

## 2024-12-10 PROCEDURE — 83880 ASSAY OF NATRIURETIC PEPTIDE: CPT | Performed by: PHYSICIAN ASSISTANT

## 2024-12-10 RX ORDER — HYDROMORPHONE HYDROCHLORIDE 2 MG/ML
1 INJECTION, SOLUTION INTRAMUSCULAR; INTRAVENOUS; SUBCUTANEOUS
Status: COMPLETED | OUTPATIENT
Start: 2024-12-10 | End: 2024-12-10

## 2024-12-10 RX ORDER — ONDANSETRON HYDROCHLORIDE 2 MG/ML
4 INJECTION, SOLUTION INTRAVENOUS
Status: COMPLETED | OUTPATIENT
Start: 2024-12-10 | End: 2024-12-10

## 2024-12-10 RX ADMIN — HYDROMORPHONE HYDROCHLORIDE 1 MG: 2 INJECTION INTRAMUSCULAR; INTRAVENOUS; SUBCUTANEOUS at 03:12

## 2024-12-10 RX ADMIN — ONDANSETRON 4 MG: 2 INJECTION INTRAMUSCULAR; INTRAVENOUS at 12:12

## 2024-12-10 RX ADMIN — ONDANSETRON 4 MG: 2 INJECTION INTRAMUSCULAR; INTRAVENOUS at 02:12

## 2024-12-10 RX ADMIN — IOHEXOL 70 ML: 350 INJECTION, SOLUTION INTRAVENOUS at 03:12

## 2024-12-10 RX ADMIN — HYDROMORPHONE HYDROCHLORIDE 1 MG: 2 INJECTION INTRAMUSCULAR; INTRAVENOUS; SUBCUTANEOUS at 12:12

## 2024-12-10 NOTE — FIRST PROVIDER EVALUATION
Medical screening examination initiated.  I have conducted a focused provider triage encounter, findings are as follows:    Brief history of present illness:  49-year-old female presents to ED for evaluation of shortness of breath.  Reports to pain radiating into her right shoulder.  Reports tingling in her right hand. Hx of stage 3 lymphoma    There were no vitals filed for this visit.    Pertinent physical exam:  Patient is awake and alert and oriented.  Ambulatory to triage.  In no acute distress.      Brief workup plan:  labs, EKG, CXR    Preliminary workup initiated; this workup will be continued and followed by the physician or advanced practice provider that is assigned to the patient when roomed.

## 2024-12-10 NOTE — ED PROVIDER NOTES
Encounter Date: 12/10/2024    SCRIBE #1 NOTE: I, Reggie Dale, am scribing for, and in the presence of,  Sarai Evans MD. I have scribed the following portions of the note - Other sections scribed: HPI, ROS, and PE.       History     Chief Complaint   Patient presents with    Shortness of Breath     Sob since 2 am with radiating R shoulder pain with tingling in R hand. Denies cough or congestion. Reports stage 3 lymphoma.      Cristina Lazar is a 49 y.o. female patient with a PMHx of CLL, DM, HTM, migraines, and thyroid disease who presents to the Emergency Department for evaluation of shortness of breath with right side upper chest pain radiating into right arm causing numbness. This current episode started around 0200 today. She states she has had these pain crisis episodes before but today is worse, rates her pain a 12-13 out of 10. Patient is currently being treated for CLL, she is taking Acalabrutinib b.I.d. Associated symptoms include upper abd pain, n/v/d, and difficulty urinating. Patient denies any cough, congestion, and fever.     PCP: Raj Yusuf MD. ONC: Zeb Bettencourt DO      The history is provided by the patient. No  was used.     Review of patient's allergies indicates:  No Known Allergies  Past Medical History:   Diagnosis Date    Arthritis     CLL (chronic lymphocytic leukemia)     Diabetes mellitus     Hypertension     Migraines     Sleep apnea     cpap    Thyroid disease     states recently instructed she could stop thyroid med.     Past Surgical History:   Procedure Laterality Date    BIOPSY OF AXILLARY NODE Left 8/18/2023    Procedure: BIOPSY, LYMPH NODE, AXILLARY;  Surgeon: Sean Styles MD;  Location: [facility];  Service: General;  Laterality: Left;    CARPAL TUNNEL RELEASE Right     CHOLECYSTECTOMY      HYSTERECTOMY      KNEE LIGAMENT RECONSTRUCTION Right     LYMPH NODE BIOPSY Left 8/18/2023    Procedure: BIOPSY, LYMPH NODE Left  Axilla 2cm;  Surgeon:  Sean Styles MD;  Location: [facility];  Service: General;  Laterality: Left;    DIANA-EN-Y PROCEDURE      rt foot Right     for neuropathy    TONSILLECTOMY AND ADENOIDECTOMY      TUBAL LIGATION       Family History   Problem Relation Name Age of Onset    Diabetes Mother      Heart failure Father       Social History     Tobacco Use    Smoking status: Never    Smokeless tobacco: Never   Substance Use Topics    Alcohol use: Not Currently     Comment: wine socially    Drug use: Never     Review of Systems   Constitutional:  Negative for chills and fever.   HENT:  Negative for congestion, rhinorrhea and sore throat.    Respiratory:  Positive for shortness of breath.    Cardiovascular:  Positive for chest pain.   Gastrointestinal:  Positive for abdominal pain (upper), diarrhea, nausea and vomiting.   Genitourinary:  Positive for difficulty urinating. Negative for dysuria.   Musculoskeletal:  Negative for neck pain.   Neurological:  Positive for numbness. Negative for weakness and headaches.       Physical Exam     Initial Vitals [12/10/24 1103]   BP Pulse Resp Temp SpO2   (!) 146/89 71 (!) 26 98.6 °F (37 °C) 100 %      MAP       --         Physical Exam    Nursing note and vitals reviewed.  Constitutional: She appears well-developed and well-nourished. She is not diaphoretic. No distress.   HENT:   Head: Normocephalic and atraumatic.   Nose: Nose normal. Mouth/Throat: Oropharynx is clear and moist.   Eyes: Conjunctivae and EOM are normal. Pupils are equal, round, and reactive to light.   Neck: Trachea normal. Neck supple.   Normal range of motion.  Cardiovascular:  Normal rate, regular rhythm, normal heart sounds and intact distal pulses.           No murmur heard.  Pulmonary/Chest: Breath sounds normal. No respiratory distress. She has no wheezes. She has no rhonchi. She has no rales. She exhibits tenderness.   Chest wall tenderness.  Scattered expiratory wheezes.    Abdominal: Abdomen is soft. She exhibits no  distension and no mass. Bowel sounds are increased. There is no abdominal tenderness.   Hyperactive bowel sounds.  There is no rebound and no guarding.   Musculoskeletal:         General: Tenderness and edema present. Normal range of motion.      Cervical back: Normal range of motion and neck supple.      Lumbar back: Normal. Normal range of motion.      Comments: Trace peripheral edema.     Neurological: She is alert and oriented to person, place, and time. She has normal strength. No cranial nerve deficit or sensory deficit.   Skin: Skin is warm and dry. Capillary refill takes less than 2 seconds. No abscess noted. No erythema. No pallor.   Psychiatric: She has a normal mood and affect. Her behavior is normal. Judgment and thought content normal.         ED Course   Procedures  Labs Reviewed   CBC W/ AUTO DIFFERENTIAL    Narrative:     The following orders were created for panel order CBC auto differential.  Procedure                               Abnormality         Status                     ---------                               -----------         ------                     CBC with Differential[[id number]]                           In process                   Please view results for these tests on the individual orders.   COMPREHENSIVE METABOLIC PANEL   B-TYPE NATRIURETIC PEPTIDE   TROPONIN I   URINALYSIS, REFLEX TO URINE CULTURE   CBC WITH DIFFERENTIAL          Imaging Results              X-Ray Chest 1 View (Final result)  Result time 12/10/24 11:27:19      Final result by Binh Gongora MD (12/10/24 11:27:19)                   Impression:      No acute cardiopulmonary process identified.      Electronically signed by: Binh Gongora  Date:    12/10/2024  Time:    11:27               Narrative:    EXAMINATION:  XR CHEST 1 VIEW    CLINICAL HISTORY:  Shortness of breath    TECHNIQUE:  One view    COMPARISON:  May 6, 2024.    FINDINGS:  Cardiopericardial silhouette is within normal limits.  No acute dense  focal or segmental consolidation, congestive process, pleural effusions or pneumothorax.                                    X-Rays:   Independently Interpreted Readings:   Chest X-Ray: Normal heart size.  No infiltrates.  No acute abnormalities.     Medications   HYDROmorphone (PF) injection 1 mg (has no administration in time range)   ondansetron injection 4 mg (has no administration in time range)     Medical Decision Making  The differential diagnosis includes, but is not limited to, pneumonia, URI, CHF, and chest wall pain.  PE  CBC, CMP, troponin, BNP, EKG, chest x-ray and CT angiogram ordered and reviewed  Paresthesias and more like a peripheral neuropathy.  She has no sensory deficit or weakness.  Chest pain is quite atypical and reproducible palpation with no acute EKG changes it is right-sided and she has no PE.  Her labs are stable and she is eager and comfortable with discharge    Problems Addressed:  Atypical chest pain: acute illness or injury that poses a threat to life or bodily functions  Chest wall pain: acute illness or injury that poses a threat to life or bodily functions  Leukocytosis, unspecified type: acute illness or injury that poses a threat to life or bodily functions  Shortness of breath: acute illness or injury that poses a threat to life or bodily functions    Amount and/or Complexity of Data Reviewed  External Data Reviewed: labs and notes.     Details: Lymphoma has had chest pain in the past white count is stable  Labs: ordered.  Radiology: ordered and independent interpretation performed.  ECG/medicine tests: ordered and independent interpretation performed.    Risk  OTC drugs.  Prescription drug management.  Parenteral controlled substances.            Scribe Attestation:   Scribe #1: I performed the above scribed service and the documentation accurately describes the services I performed. I attest to the accuracy of the note.  Comments: Attending:   Physician Attestation Statement  for Scribe #1: Sarai WELCH MD, personally performed the services described in this documentation. All medical record entries made by the scribe were at my direction and in my presence.  I have reviewed the chart and agree that the record reflects my personal performance and is accurate and complete.        Attending Attestation:           Physician Attestation for Scribe:  Physician Attestation Statement for Scribe #1: IrCistina Brooke R, MD, reviewed documentation, as scribed by Reggie Dale in my presence, and it is both accurate and complete.             ED Course as of 12/10/24 1550   Tue Dec 10, 2024   1320 Feeling better [BS]   1534 Dilaudid wore off, she is comfortable wth repeat dose and taking home meds afterwards [BS]      ED Course User Index  [BS] Sarai Evans MD                           Clinical Impression:  Final diagnoses:  [R06.02] Shortness of breath                 Sarai Evans MD  12/10/24 0871

## 2025-01-06 ENCOUNTER — LAB VISIT (OUTPATIENT)
Dept: LAB | Facility: HOSPITAL | Age: 50
End: 2025-01-06
Attending: STUDENT IN AN ORGANIZED HEALTH CARE EDUCATION/TRAINING PROGRAM
Payer: COMMERCIAL

## 2025-01-06 DIAGNOSIS — M05.79 RHEUMATOID ARTHRITIS WITH RHEUMATOID FACTOR OF MULTIPLE SITES WITHOUT ORGAN OR SYSTEMS INVOLVEMENT: Primary | ICD-10-CM

## 2025-01-06 LAB
ALBUMIN SERPL-MCNC: 3.8 G/DL (ref 3.5–5)
ALBUMIN/GLOB SERPL: 1.3 RATIO (ref 1.1–2)
ALP SERPL-CCNC: 76 UNIT/L (ref 40–150)
ALT SERPL-CCNC: 23 UNIT/L (ref 0–55)
ANION GAP SERPL CALC-SCNC: 7 MEQ/L
AST SERPL-CCNC: 15 UNIT/L (ref 5–34)
BASOPHILS # BLD AUTO: 0.08 X10(3)/MCL
BASOPHILS NFR BLD AUTO: 0.4 %
BILIRUB DIRECT SERPL-MCNC: 0.2 MG/DL (ref 0–?)
BILIRUB SERPL-MCNC: 0.8 MG/DL
BUN SERPL-MCNC: 10.1 MG/DL (ref 9.8–20.1)
CALCIUM SERPL-MCNC: 9.3 MG/DL (ref 8.4–10.2)
CHLORIDE SERPL-SCNC: 110 MMOL/L (ref 98–107)
CK SERPL-CCNC: 74 U/L (ref 29–168)
CO2 SERPL-SCNC: 25 MMOL/L (ref 22–29)
CREAT SERPL-MCNC: 0.72 MG/DL (ref 0.55–1.02)
CREAT/UREA NIT SERPL: 14
EOSINOPHIL # BLD AUTO: 0.11 X10(3)/MCL (ref 0–0.9)
EOSINOPHIL NFR BLD AUTO: 0.6 %
ERYTHROCYTE [DISTWIDTH] IN BLOOD BY AUTOMATED COUNT: 14.2 % (ref 11.5–17)
GFR SERPLBLD CREATININE-BSD FMLA CKD-EPI: >60 ML/MIN/1.73/M2
GLOBULIN SER-MCNC: 3 GM/DL (ref 2.4–3.5)
GLUCOSE SERPL-MCNC: 88 MG/DL (ref 74–100)
HCT VFR BLD AUTO: 39.4 % (ref 37–47)
HGB BLD-MCNC: 12.4 G/DL (ref 12–16)
IMM GRANULOCYTES # BLD AUTO: 0.06 X10(3)/MCL (ref 0–0.04)
IMM GRANULOCYTES NFR BLD AUTO: 0.3 %
LYMPHOCYTES # BLD AUTO: 13.41 X10(3)/MCL (ref 0.6–4.6)
LYMPHOCYTES NFR BLD AUTO: 70.2 %
MCH RBC QN AUTO: 28.2 PG (ref 27–31)
MCHC RBC AUTO-ENTMCNC: 31.5 G/DL (ref 33–36)
MCV RBC AUTO: 89.7 FL (ref 80–94)
MONOCYTES # BLD AUTO: 0.85 X10(3)/MCL (ref 0.1–1.3)
MONOCYTES NFR BLD AUTO: 4.4 %
NEUTROPHILS # BLD AUTO: 4.6 X10(3)/MCL (ref 2.1–9.2)
NEUTROPHILS NFR BLD AUTO: 24.1 %
NRBC BLD AUTO-RTO: 0 %
PHOSPHATE SERPL-MCNC: 3.9 MG/DL (ref 2.3–4.7)
PLATELET # BLD AUTO: 206 X10(3)/MCL (ref 130–400)
PMV BLD AUTO: 10.4 FL (ref 7.4–10.4)
POTASSIUM SERPL-SCNC: 3.8 MMOL/L (ref 3.5–5.1)
PROT SERPL-MCNC: 6.8 GM/DL (ref 6.4–8.3)
RBC # BLD AUTO: 4.39 X10(6)/MCL (ref 4.2–5.4)
SODIUM SERPL-SCNC: 142 MMOL/L (ref 136–145)
URATE SERPL-MCNC: 5.8 MG/DL (ref 2.6–6)
WBC # BLD AUTO: 19.11 X10(3)/MCL (ref 4.5–11.5)

## 2025-01-06 PROCEDURE — 82248 BILIRUBIN DIRECT: CPT

## 2025-01-06 PROCEDURE — 85025 COMPLETE CBC W/AUTO DIFF WBC: CPT

## 2025-01-06 PROCEDURE — 80053 COMPREHEN METABOLIC PANEL: CPT

## 2025-01-06 PROCEDURE — 84550 ASSAY OF BLOOD/URIC ACID: CPT

## 2025-01-06 PROCEDURE — 84100 ASSAY OF PHOSPHORUS: CPT

## 2025-01-06 PROCEDURE — 82550 ASSAY OF CK (CPK): CPT

## 2025-01-06 PROCEDURE — 36415 COLL VENOUS BLD VENIPUNCTURE: CPT

## 2025-02-22 ENCOUNTER — HOSPITAL ENCOUNTER (EMERGENCY)
Facility: HOSPITAL | Age: 50
Discharge: HOME | End: 2025-02-22
Attending: STUDENT IN AN ORGANIZED HEALTH CARE EDUCATION/TRAINING PROGRAM
Payer: COMMERCIAL

## 2025-02-22 VITALS
BODY MASS INDEX: 42.95 KG/M2 | SYSTOLIC BLOOD PRESSURE: 104 MMHG | HEIGHT: 69 IN | TEMPERATURE: 98 F | HEART RATE: 79 BPM | DIASTOLIC BLOOD PRESSURE: 66 MMHG | OXYGEN SATURATION: 99 % | WEIGHT: 290 LBS | RESPIRATION RATE: 13 BRPM

## 2025-02-22 DIAGNOSIS — R06.02 SHORTNESS OF BREATH: ICD-10-CM

## 2025-02-22 DIAGNOSIS — I10 HYPERTENSION, UNSPECIFIED TYPE: ICD-10-CM

## 2025-02-22 DIAGNOSIS — U07.1 COVID-19: Primary | ICD-10-CM

## 2025-02-22 LAB
ALBUMIN SERPL-MCNC: 4 G/DL (ref 3.5–5)
ALBUMIN/GLOB SERPL: 1.2 RATIO (ref 1.1–2)
ALP SERPL-CCNC: 82 UNIT/L (ref 40–150)
ALT SERPL-CCNC: 29 UNIT/L (ref 0–55)
ANION GAP SERPL CALC-SCNC: 13 MEQ/L
AST SERPL-CCNC: 20 UNIT/L (ref 5–34)
BASOPHILS # BLD AUTO: 0.07 X10(3)/MCL
BASOPHILS NFR BLD AUTO: 0.4 %
BILIRUB SERPL-MCNC: 0.4 MG/DL
BNP BLD-MCNC: <10 PG/ML
BUN SERPL-MCNC: 8.8 MG/DL (ref 9.8–20.1)
CALCIUM SERPL-MCNC: 9.2 MG/DL (ref 8.4–10.2)
CHLORIDE SERPL-SCNC: 111 MMOL/L (ref 98–107)
CO2 SERPL-SCNC: 17 MMOL/L (ref 22–29)
CREAT SERPL-MCNC: 0.73 MG/DL (ref 0.55–1.02)
CREAT/UREA NIT SERPL: 12
EOSINOPHIL # BLD AUTO: 0.15 X10(3)/MCL (ref 0–0.9)
EOSINOPHIL NFR BLD AUTO: 0.8 %
ERYTHROCYTE [DISTWIDTH] IN BLOOD BY AUTOMATED COUNT: 13.6 % (ref 11.5–17)
FLUAV AG UPPER RESP QL IA.RAPID: NOT DETECTED
FLUBV AG UPPER RESP QL IA.RAPID: NOT DETECTED
GFR SERPLBLD CREATININE-BSD FMLA CKD-EPI: >60 ML/MIN/1.73/M2
GLOBULIN SER-MCNC: 3.3 GM/DL (ref 2.4–3.5)
GLUCOSE SERPL-MCNC: 90 MG/DL (ref 74–100)
HCT VFR BLD AUTO: 41.5 % (ref 37–47)
HGB BLD-MCNC: 13.2 G/DL (ref 12–16)
IMM GRANULOCYTES # BLD AUTO: 0.03 X10(3)/MCL (ref 0–0.04)
IMM GRANULOCYTES NFR BLD AUTO: 0.2 %
LACTATE SERPL-SCNC: 1.3 MMOL/L (ref 0.5–2.2)
LYMPHOCYTES # BLD AUTO: 12.53 X10(3)/MCL (ref 0.6–4.6)
LYMPHOCYTES NFR BLD AUTO: 69.6 %
MCH RBC QN AUTO: 28.6 PG (ref 27–31)
MCHC RBC AUTO-ENTMCNC: 31.8 G/DL (ref 33–36)
MCV RBC AUTO: 89.8 FL (ref 80–94)
MONOCYTES # BLD AUTO: 1.3 X10(3)/MCL (ref 0.1–1.3)
MONOCYTES NFR BLD AUTO: 7.2 %
NEUTROPHILS # BLD AUTO: 3.91 X10(3)/MCL (ref 2.1–9.2)
NEUTROPHILS NFR BLD AUTO: 21.8 %
NRBC BLD AUTO-RTO: 0 %
PLATELET # BLD AUTO: 196 X10(3)/MCL (ref 130–400)
PMV BLD AUTO: 10.5 FL (ref 7.4–10.4)
POTASSIUM SERPL-SCNC: 4.3 MMOL/L (ref 3.5–5.1)
PROT SERPL-MCNC: 7.3 GM/DL (ref 6.4–8.3)
RBC # BLD AUTO: 4.62 X10(6)/MCL (ref 4.2–5.4)
SARS-COV-2 RNA RESP QL NAA+PROBE: DETECTED
SODIUM SERPL-SCNC: 141 MMOL/L (ref 136–145)
TROPONIN I SERPL-MCNC: <0.01 NG/ML (ref 0–0.04)
TROPONIN I SERPL-MCNC: <0.01 NG/ML (ref 0–0.04)
WBC # BLD AUTO: 17.99 X10(3)/MCL (ref 4.5–11.5)

## 2025-02-22 PROCEDURE — 25500020 PHARM REV CODE 255: Performed by: STUDENT IN AN ORGANIZED HEALTH CARE EDUCATION/TRAINING PROGRAM

## 2025-02-22 PROCEDURE — 87040 BLOOD CULTURE FOR BACTERIA: CPT

## 2025-02-22 PROCEDURE — 96376 TX/PRO/DX INJ SAME DRUG ADON: CPT

## 2025-02-22 PROCEDURE — 0240U COVID/FLU A&B PCR: CPT

## 2025-02-22 PROCEDURE — 85025 COMPLETE CBC W/AUTO DIFF WBC: CPT

## 2025-02-22 PROCEDURE — 83880 ASSAY OF NATRIURETIC PEPTIDE: CPT

## 2025-02-22 PROCEDURE — 80053 COMPREHEN METABOLIC PANEL: CPT

## 2025-02-22 PROCEDURE — 84484 ASSAY OF TROPONIN QUANT: CPT

## 2025-02-22 PROCEDURE — 99285 EMERGENCY DEPT VISIT HI MDM: CPT | Mod: 25

## 2025-02-22 PROCEDURE — 63600175 PHARM REV CODE 636 W HCPCS: Performed by: STUDENT IN AN ORGANIZED HEALTH CARE EDUCATION/TRAINING PROGRAM

## 2025-02-22 PROCEDURE — 96374 THER/PROPH/DIAG INJ IV PUSH: CPT

## 2025-02-22 PROCEDURE — 93005 ELECTROCARDIOGRAM TRACING: CPT

## 2025-02-22 PROCEDURE — 83605 ASSAY OF LACTIC ACID: CPT

## 2025-02-22 PROCEDURE — 84484 ASSAY OF TROPONIN QUANT: CPT | Performed by: STUDENT IN AN ORGANIZED HEALTH CARE EDUCATION/TRAINING PROGRAM

## 2025-02-22 PROCEDURE — 96361 HYDRATE IV INFUSION ADD-ON: CPT

## 2025-02-22 PROCEDURE — 96375 TX/PRO/DX INJ NEW DRUG ADDON: CPT

## 2025-02-22 PROCEDURE — 93010 ELECTROCARDIOGRAM REPORT: CPT | Mod: ,,, | Performed by: INTERNAL MEDICINE

## 2025-02-22 RX ORDER — LORATADINE 10 MG/1
10 TABLET ORAL DAILY
Qty: 30 TABLET | Refills: 0 | Status: SHIPPED | OUTPATIENT
Start: 2025-02-22 | End: 2025-03-24

## 2025-02-22 RX ORDER — GUAIFENESIN 600 MG/1
1200 TABLET, EXTENDED RELEASE ORAL 2 TIMES DAILY
Qty: 40 TABLET | Refills: 0 | Status: SHIPPED | OUTPATIENT
Start: 2025-02-22 | End: 2025-02-22

## 2025-02-22 RX ORDER — GUAIFENESIN 100 MG/5ML
100-200 LIQUID ORAL EVERY 4 HOURS PRN
Qty: 60 ML | Refills: 0 | Status: SHIPPED | OUTPATIENT
Start: 2025-02-22 | End: 2025-03-04

## 2025-02-22 RX ORDER — FLUTICASONE PROPIONATE 50 MCG
2 SPRAY, SUSPENSION (ML) NASAL 2 TIMES DAILY PRN
Qty: 15 G | Refills: 0 | Status: SHIPPED | OUTPATIENT
Start: 2025-02-22 | End: 2025-02-22

## 2025-02-22 RX ORDER — MORPHINE SULFATE 4 MG/ML
4 INJECTION, SOLUTION INTRAMUSCULAR; INTRAVENOUS
Refills: 0 | Status: COMPLETED | OUTPATIENT
Start: 2025-02-22 | End: 2025-02-22

## 2025-02-22 RX ORDER — FLUTICASONE PROPIONATE 50 MCG
2 SPRAY, SUSPENSION (ML) NASAL 2 TIMES DAILY PRN
Qty: 15 G | Refills: 0 | Status: SHIPPED | OUTPATIENT
Start: 2025-02-22

## 2025-02-22 RX ORDER — DOXYCYCLINE 100 MG/1
100 CAPSULE ORAL 2 TIMES DAILY
Qty: 20 CAPSULE | Refills: 0 | Status: SHIPPED | OUTPATIENT
Start: 2025-02-22 | End: 2025-03-04

## 2025-02-22 RX ORDER — BENZONATATE 100 MG/1
100 CAPSULE ORAL 3 TIMES DAILY PRN
Qty: 20 CAPSULE | Refills: 0 | Status: SHIPPED | OUTPATIENT
Start: 2025-02-22 | End: 2025-02-22

## 2025-02-22 RX ORDER — GUAIFENESIN 100 MG/5ML
100-200 LIQUID ORAL EVERY 4 HOURS PRN
Qty: 60 ML | Refills: 0 | Status: SHIPPED | OUTPATIENT
Start: 2025-02-22 | End: 2025-02-22

## 2025-02-22 RX ORDER — BENZONATATE 100 MG/1
100 CAPSULE ORAL 3 TIMES DAILY PRN
Qty: 20 CAPSULE | Refills: 0 | Status: SHIPPED | OUTPATIENT
Start: 2025-02-22 | End: 2025-03-04

## 2025-02-22 RX ORDER — DOXYCYCLINE 100 MG/1
100 CAPSULE ORAL 2 TIMES DAILY
Qty: 20 CAPSULE | Refills: 0 | Status: SHIPPED | OUTPATIENT
Start: 2025-02-22 | End: 2025-02-22

## 2025-02-22 RX ORDER — HYDRALAZINE HYDROCHLORIDE 20 MG/ML
10 INJECTION INTRAMUSCULAR; INTRAVENOUS
Status: DISCONTINUED | OUTPATIENT
Start: 2025-02-22 | End: 2025-02-23 | Stop reason: HOSPADM

## 2025-02-22 RX ORDER — ONDANSETRON HYDROCHLORIDE 2 MG/ML
4 INJECTION, SOLUTION INTRAVENOUS
Status: COMPLETED | OUTPATIENT
Start: 2025-02-22 | End: 2025-02-22

## 2025-02-22 RX ORDER — GUAIFENESIN 600 MG/1
1200 TABLET, EXTENDED RELEASE ORAL 2 TIMES DAILY
Qty: 40 TABLET | Refills: 0 | Status: SHIPPED | OUTPATIENT
Start: 2025-02-22 | End: 2025-03-04

## 2025-02-22 RX ADMIN — MORPHINE SULFATE 4 MG: 4 INJECTION INTRAVENOUS at 03:02

## 2025-02-22 RX ADMIN — ONDANSETRON 4 MG: 2 INJECTION INTRAMUSCULAR; INTRAVENOUS at 03:02

## 2025-02-22 RX ADMIN — IOHEXOL 100 ML: 350 INJECTION, SOLUTION INTRAVENOUS at 05:02

## 2025-02-22 RX ADMIN — MORPHINE SULFATE 4 MG: 4 INJECTION INTRAVENOUS at 08:02

## 2025-02-22 RX ADMIN — SODIUM CHLORIDE, POTASSIUM CHLORIDE, SODIUM LACTATE AND CALCIUM CHLORIDE 1000 ML: 600; 310; 30; 20 INJECTION, SOLUTION INTRAVENOUS at 03:02

## 2025-02-22 NOTE — FIRST PROVIDER EVALUATION
"Medical screening examination initiated.  I have conducted a focused provider triage encounter, findings are as follows:    Brief history of present illness:  49 year old female presents to ER with SOB and chest pain onset today. Patient reports cough and low grade fever today. Patient has history of stage 3 lymphoma. States she is on chemo    Vitals:    02/22/25 1439   BP: (!) 140/96   Pulse: 74   Resp: 20   Temp: 98.3 °F (36.8 °C)   TempSrc: Oral   SpO2: 98%   Weight: 131.5 kg (290 lb)   Height: 5' 9" (1.753 m)       Pertinent physical exam:  awake and alert, nad    Brief workup plan:  labs, EKG, cxr    Preliminary workup initiated; this workup will be continued and followed by the physician or advanced practice provider that is assigned to the patient when roomed.  "

## 2025-02-22 NOTE — ED PROVIDER NOTES
Encounter Date: 2/22/2025    SCRIBE #1 NOTE: I, Jake Davenport, am scribing for, and in the presence of,  Warner Licona MD. I have scribed the following portions of the note - Other sections scribed: HPI, ROS, PE.       History     Chief Complaint   Patient presents with    Shortness of Breath    Chest Pain     Cough/ fevers x 1 week with chest pain and shortness of breath that started this morning. Hx of high blood pressure. Denies any cardiac problems. Hx of cancer, currently receiving chemo.     Patient is a 50 y/o female with a hx of chronic lymphocyte leukemia on calquence 100 mg BID, DM, and HTN who presents to the ED for shortness of breath for the past week. Pt states for the past week she has been experiencing shortness of breath with associated fever, generalized body aches, nausea, and vomiting that is not relieved with medication. She notes her shortness of breath as a tightness and pressure in her chest that is worsened when lying back. Pt denies any cardiac hx.     The history is provided by the patient and medical records. No  was used.     Review of patient's allergies indicates:  No Known Allergies  Past Medical History:   Diagnosis Date    Arthritis     CLL (chronic lymphocytic leukemia)     Diabetes mellitus     Hypertension     Migraines     Sleep apnea     cpap    Thyroid disease     states recently instructed she could stop thyroid med.     Past Surgical History:   Procedure Laterality Date    BIOPSY OF AXILLARY NODE Left 8/18/2023    Procedure: BIOPSY, LYMPH NODE, AXILLARY;  Surgeon: Sean Styles MD;  Location: [facility] OR;  Service: General;  Laterality: Left;    CARPAL TUNNEL RELEASE Right     CHOLECYSTECTOMY      HYSTERECTOMY      KNEE LIGAMENT RECONSTRUCTION Right     LYMPH NODE BIOPSY Left 8/18/2023    Procedure: BIOPSY, LYMPH NODE Left  Axilla 2cm;  Surgeon: Sean Styles MD;  Location: [facility] OR;  Service: General;  Laterality: Left;    DIANA-EN-Y PROCEDURE       rt foot Right     for neuropathy    TONSILLECTOMY AND ADENOIDECTOMY      TUBAL LIGATION       Family History   Problem Relation Name Age of Onset    Diabetes Mother      Heart failure Father       Social History[1]  Review of Systems   Constitutional:  Positive for fever. Negative for chills.   HENT:  Negative for congestion, drooling and sore throat.    Eyes:  Negative for pain and visual disturbance.   Respiratory:  Positive for chest tightness and shortness of breath. Negative for wheezing.    Cardiovascular:  Negative for chest pain, palpitations and leg swelling.   Gastrointestinal:  Positive for nausea and vomiting. Negative for abdominal pain.   Genitourinary:  Negative for dysuria and hematuria.   Musculoskeletal:  Positive for myalgias. Negative for back pain, neck pain and neck stiffness.   Skin:  Negative for pallor and rash.   Allergic/Immunologic: Positive for immunocompromised state.   Neurological:  Negative for weakness and numbness.   Hematological:  Does not bruise/bleed easily.       Physical Exam     Initial Vitals [02/22/25 1439]   BP Pulse Resp Temp SpO2   (!) 140/96 74 20 98.3 °F (36.8 °C) 98 %      MAP       --         Physical Exam    Nursing note and vitals reviewed.  Constitutional: She appears well-developed and well-nourished. She is not diaphoretic. No distress.   Uncomfortable appearing   HENT:   Head: Normocephalic and atraumatic.   Nose: Nose normal. Mouth/Throat: Oropharynx is clear and moist.   Eyes: EOM are normal. Pupils are equal, round, and reactive to light.   Neck: Neck supple.   Normal range of motion.  Cardiovascular:  Normal rate and regular rhythm.           No murmur heard.  Pulmonary/Chest: Breath sounds normal. No respiratory distress. She has no wheezes. She has no rales.   Abdominal: Abdomen is soft. She exhibits no distension. There is no abdominal tenderness.   Musculoskeletal:      Cervical back: Normal range of motion and neck supple.     Neurological: She  is alert and oriented to person, place, and time. She has normal strength. No cranial nerve deficit or sensory deficit.   Moving all extremities    Skin: Skin is warm. Capillary refill takes less than 2 seconds. No rash noted.         ED Course   Procedures  Labs Reviewed   COMPREHENSIVE METABOLIC PANEL - Abnormal       Result Value    Sodium 141      Potassium 4.3      Chloride 111 (*)     CO2 17 (*)     Glucose 90      Blood Urea Nitrogen 8.8 (*)     Creatinine 0.73      Calcium 9.2      Protein Total 7.3      Albumin 4.0      Globulin 3.3      Albumin/Globulin Ratio 1.2      Bilirubin Total 0.4      ALP 82      ALT 29      AST 20      eGFR >60      Anion Gap 13.0      BUN/Creatinine Ratio 12     COVID/FLU A&B PCR - Abnormal    Influenza A PCR Not Detected      Influenza B PCR Not Detected      SARS-CoV-2 PCR Detected (*)     Narrative:     The Xpert Xpress SARS-CoV-2/FLU/RSV plus is a rapid, multiplexed real-time PCR test intended for the simultaneous qualitative detection and differentiation of SARS-CoV-2, Influenza A, Influenza B, and respiratory syncytial virus (RSV) viral RNA in either nasopharyngeal swab or nasal swab specimens.         CBC WITH DIFFERENTIAL - Abnormal    WBC 17.99 (*)     RBC 4.62      Hgb 13.2      Hct 41.5      MCV 89.8      MCH 28.6      MCHC 31.8 (*)     RDW 13.6      Platelet 196      MPV 10.5 (*)     Neut % 21.8      Lymph % 69.6      Mono % 7.2      Eos % 0.8      Basophil % 0.4      Imm Grans % 0.2      Neut # 3.91      Lymph # 12.53 (*)     Mono # 1.30      Eos # 0.15      Baso # 0.07      Imm Gran # 0.03      NRBC% 0.0     BLOOD CULTURE OLG - Normal    Blood Culture No Growth at 5 days     BLOOD CULTURE OLG - Normal    Blood Culture No Growth at 5 days     B-TYPE NATRIURETIC PEPTIDE - Normal    Natriuretic Peptide <10.0     TROPONIN I - Normal    Troponin-I <0.010     LACTIC ACID, PLASMA - Normal    Lactic Acid Level 1.3     TROPONIN I - Normal    Troponin-I <0.010     CBC W/ AUTO  DIFFERENTIAL    Narrative:     The following orders were created for panel order CBC auto differential.  Procedure                               Abnormality         Status                     ---------                               -----------         ------                     CBC with Differential[[id number]]       Abnormal            Final result                 Please view results for these tests on the individual orders.        ECG Results              EKG 12-lead (Final result)        Collection Time Result Time QRS Duration OHS QTC Calculation    02/22/25 14:35:58 02/23/25 01:43:35 88 428                     Final result by Interface, Lab In [facility] (02/23/25 01:43:40)                   Narrative:    Test Reason : R06.02,    Vent. Rate :  65 BPM     Atrial Rate :  65 BPM     P-R Int : 198 ms          QRS Dur :  88 ms      QT Int : 412 ms       P-R-T Axes :  43  -8  15 degrees    QTcB Int : 428 ms    Normal sinus rhythm  Minimal voltage criteria for LVH, may be normal variant ( R in aVL )  Borderline Abnormal ECG  When compared with ECG of 10-Dec-2024 11:05,  No significant change was found  Confirmed by Antoni Bolton ([id number]) on 2/23/2025 1:43:31 AM    Referred By:            Confirmed By: Antoni Bolton                                     EKG 12-lead (Final result)  Result time 02/26/25 10:25:30      Final result by Unknown User (02/26/25 10:25:30)                                      Imaging Results              CTA Chest Non-Coronary (PE Studies) (Final result)  Result time 02/22/25 17:17:01      Final result by Binh Gongora MD (02/22/25 17:17:01)                   Impression:      1.  No pulmonary embolism identified.    2.  Cardiomegaly and mild congestive failure.    3.  Persistent bilateral axillary and subpectoral lymphadenopathy some of which show interval size increase.    4.  Details of other findings above.      Electronically signed by: Binh Gongora  Date:    02/22/2025  Time:    17:17                Narrative:    EXAMINATION:  CTA CHEST NON CORONARY (PE STUDIES)    CLINICAL HISTORY:  Cough and fever 1 week.  Chest pain.  Shortness of breath.  History of cancer, currently receiving chemotherapy.    TECHNIQUE:  Sequential axial images performed of the chest using pulmonary embolism protocol following intravenous contrast bolus. Sagittal and contrast reformations performed.    Dose product length of 615 mGycm. Automated exposure control was utilized to minimize radiation dose.    COMPARISON:  CT chest December 10, 2024.    FINDINGS:  Optimal contrast bolus timing with adequately opacified pulmonary artery system is without evidence of filling defects from pulmonary thromboembolism within the main pulmonary artery and the major branches.  Ascending thoracic aortic aneurysmal dilatation with maximum diameter of 4.8 cm is without significant interval change.  Descending thoracic aorta is 3.5 cm in maximum diameter.  There are no signs of thoracic aortic dissection.  No pericardial effusion.    Cardiac chambers are enlarged in size.  Bilateral lungs ground-glass opacities reflect mild congestive failure.  There are bilateral axillary and subpectoral numerous enlarged lymph nodes some of which show interval size increase.  Right axillary largest lymph node is 3.2 cm which previously was 2.0 cm.  Largest left subpectoral lymph node is 1.6 cm.  There are also bilateral lower neck enlarged lymph nodes.    There are gastric operative changes.  Adrenals are not enlarged in size.  No acute or aggressive skeletal abnormality.                                       X-Ray Chest 1 View (Final result)  Result time 02/22/25 15:23:58   Procedure changed from X-Ray Chest PA And Lateral     Final result by Ray Zheng MD (02/22/25 15:23:58)                   Narrative:    EXAMINATION  XR CHEST 1 VIEW    CLINICAL HISTORY  SOB; Shortness of breath    TECHNIQUE  A total of 1 frontal image(s) submitted of the  chest.    COMPARISON  10 December 2024    FINDINGS  Lines/tubes/devices: ECG leads overlie the imaged region.    The cardiac silhouette is mildly enlarged and there is prominent, congested appearance of the central pulmonary vasculature; this may be somewhat exaggerated secondary to rightward patient rotation and associated projectional artifact.  The trachea is midline. No new or worsening consolidation is developed in the interval.  There is no large pleural effusion or convincing pneumothorax.    Regional osseous structures and extrathoracic soft tissues are similar.    IMPRESSION  1. No convincing acute intrathoracic process.  2. Mild cardiac enlargement and pulmonary vascular congestion, which may be exaggerated by rotation artifact.      Electronically signed by: Ray Zheng  Date:    02/22/2025  Time:    15:23                                     Medications   lactated ringers bolus 1,000 mL (0 mLs Intravenous Stopped 2/22/25 1734)   morphine injection 4 mg (4 mg Intravenous Given 2/22/25 1559)   ondansetron injection 4 mg (4 mg Intravenous Given 2/22/25 1559)   iohexoL (OMNIPAQUE 350) injection 100 mL (100 mLs Intravenous Given 2/22/25 1706)   morphine injection 4 mg (4 mg Intravenous Given 2/22/25 2038)     Medical Decision Making  Problems Addressed:  COVID-19: acute illness or injury  Hypertension, unspecified type: acute illness or injury    Amount and/or Complexity of Data Reviewed  Labs:  Decision-making details documented in ED Course.  Radiology: ordered. Decision-making details documented in ED Course.    Risk  OTC drugs.  Prescription drug management.    Differential diagnosis (includes but is not limited to):   COVID, flu, RSV, pneumonia, infection, sepsis, dehydration, kidney injury, electrolyte abnormalities, ACS, arrhythmia    MDM Narrative  1-year-old female presents for evaluation of shortness of breath, chest discomfort for the past week.  She is currently on chemotherapy.  Labs reviewed.   COVID swab positive.  X-ray reviewed.  CTA of the chest performed.  Symptoms have resolved with the IV fluids, pain medications, nausea medications in the emergency department.  I engaged in shared decision-making with the patient and determine final disposition.  I offered admission for observation, close monitoring, further control of symptoms.  Patient has declined admission stating she feels well and would prefer to be discharged home as she has follow up already scheduled prefer to be seen in the outpatient.  I feel this is reasonable as the patient remains afebrile, hemodynamically stable, stable on room air with no evidence of respiratory distress.  Strict return precautions discussed and understood.  I have stressed the importance of following up with her oncology team as well as her PCP for further evaluation of her symptoms she states she understands and will follow up has been discussed.  Patient is ambulatory at baseline with a steady gait.  Patient states she is ready to go home.    Dispo: Discharge    My independent radiology interpretation: as above  Point of care US (independently performed and interpreted):   Decision rules/clinical scoring:     Sepsis Perfusion Assessment:     Amount and/or Complexity of Data Reviewed  Independent historian:    Summary of history:   External data reviewed: no prior records available for review   Summary of data reviewed:   Risk and benefits of testing: discussed   Labs: ordered and reviewed  Radiology: ordered and independent interpretation performed (see above or ED course)  ECG/medicine tests: ordered and independent interpretation performed (see above or ED course)  Discussion of management or test interpretation with external provider(s): none   Summary of discussion:     Risk  OTC medications  Prescription drug management   Shared decision making     Critical Care  none    Data Reviewed/Counseling: I have personally reviewed the patient's vital signs, nursing  notes, and other relevant tests, information, and imaging. I had a detailed discussion regarding the historical points, exam findings, and any diagnostic results supporting the discharge diagnosis. I personally performed the history, PE, MDM and procedures as documented above and agree with the scribe's documentation.    Portions of this note were dictated using voice recognition software. Although it was reviewed for accuracy, some inherent voice recognition errors may have occurred and may be present in this document.          Scribe Attestation:   Scribe #1: I performed the above scribed service and the documentation accurately describes the services I performed. I attest to the accuracy of the note.    Attending Attestation:           Physician Attestation for Scribe:  Physician Attestation Statement for Scribe #1: I, Warner Licona MD, reviewed documentation, as scribed by Jake Davenport in my presence, and it is both accurate and complete.             ED Course as of 03/13/25 0546   Sat Feb 22, 2025   1547 SARS-CoV2 (COVID-19) Qualitative PCR(!): Detected [MC]   1609 EKG independently interpreted by me.  EKG: NSR @ 65, no STEMI, Qtc 428, LVH [MC]   1610 X-Ray Chest 1 View  Independently visualized/reviewed by me during the ED visit.  - No lobar consolidation or PTX [MC]      ED Course User Index  [MC] Warner Licona MD                           Clinical Impression:  Final diagnoses:  [R06.02] Shortness of breath  [U07.1] COVID-19 (Primary)  [I10] Hypertension, unspecified type          ED Disposition Condition    Discharge Stable          ED Prescriptions       Medication Sig Dispense Start Date End Date Auth. Provider    guaiFENesin (MUCINEX) 600 mg 12 hr tablet  (Status: Discontinued) Take 2 tablets (1,200 mg total) by mouth 2 (two) times daily. for 10 days 40 tablet 2/22/2025 2/22/2025 Warner Licona MD    fluticasone propionate (FLONASE) 50 mcg/actuation nasal spray  (Status:  Discontinued) 2 sprays (100 mcg total) by Each Nostril route 2 (two) times daily as needed for Rhinitis. 15 g 2025 Warner Licona MD    benzonatate (TESSALON) 100 MG capsule  (Status: Discontinued) Take 1 capsule (100 mg total) by mouth 3 (three) times daily as needed. 20 capsule 2025 Warner Licona MD    doxycycline (VIBRAMYCIN) 100 MG Cap  (Status: Discontinued) Take 1 capsule (100 mg total) by mouth 2 (two) times daily. for 10 days 20 capsule 2025 Warner Licona MD    guaiFENesin 100 mg/5 ml (ROBITUSSIN) 100 mg/5 mL syrup  (Status: Discontinued) Take 5-10 mLs (100-200 mg total) by mouth every 4 (four) hours as needed for Cough. 60 mL 2025 Warner Licona MD    benzonatate (TESSALON) 100 MG capsule () Take 1 capsule (100 mg total) by mouth 3 (three) times daily as needed for Cough. 20 capsule 2025 3/4/2025 Warner Licona MD    doxycycline (VIBRAMYCIN) 100 MG Cap () Take 1 capsule (100 mg total) by mouth 2 (two) times daily. for 10 days 20 capsule 2025 3/4/2025 Warner Licona MD    fluticasone propionate (FLONASE) 50 mcg/actuation nasal spray 2 sprays (100 mcg total) by Each Nostril route 2 (two) times daily as needed for Rhinitis. 15 g 2025 -- Warner Licona MD    guaiFENesin (MUCINEX) 600 mg 12 hr tablet () Take 2 tablets (1,200 mg total) by mouth 2 (two) times daily. for 10 days 40 tablet 2025 3/4/2025 Warner Licona MD    guaiFENesin 100 mg/5 ml (ROBITUSSIN) 100 mg/5 mL syrup () Take 5-10 mLs (100-200 mg total) by mouth every 4 (four) hours as needed for Cough. 60 mL 2025 3/4/2025 Warner Licona MD    loratadine (CLARITIN) 10 mg tablet Take 1 tablet (10 mg total) by mouth once daily. 30 tablet 2025 3/24/2025 Warner Licona MD          Follow-up Information       Follow up With Specialties Details Why Contact Lauri Yusuf  Raj DUMONT MD Internal Medicine Schedule an appointment as soon as possible for a visit   [address]  [address]  [facility] [address]  [phone]      [facility] [place]  Go in 1 day as scheduled [address] [facility] [address]    Zeb Bettencourt DO Internal Medicine, Hematology and Oncology Schedule an appointment as soon as possible for a visit   [address]  [address]  [facility] [address]  [phone]      [facility] - Emergency Dept Emergency Medicine  If symptoms worsen [address] [facility] [address]  [phone]                 Warner Licona MD  03/13/25 0545       [1]   Social History  Tobacco Use    Smoking status: Never    Smokeless tobacco: Never   Substance Use Topics    Alcohol use: Not Currently     Comment: wine socially    Drug use: Never        Warner Licona MD  03/13/25 0560

## 2025-02-23 LAB
OHS QRS DURATION: 88 MS
OHS QTC CALCULATION: 428 MS

## 2025-02-23 NOTE — DISCHARGE INSTRUCTIONS

## 2025-02-27 LAB
BACTERIA BLD CULT: NORMAL
BACTERIA BLD CULT: NORMAL

## 2025-03-05 NOTE — ED NOTES
Bed: 18  Expected date:   Expected time:   Means of arrival:   Comments:  Cady   The patient is a 58y Female complaining of fever.

## 2025-03-07 ENCOUNTER — LAB VISIT (OUTPATIENT)
Dept: LAB | Facility: HOSPITAL | Age: 50
End: 2025-03-07
Attending: INTERNAL MEDICINE
Payer: COMMERCIAL

## 2025-03-07 DIAGNOSIS — E10.9 DIABETES MELLITUS TYPE I: ICD-10-CM

## 2025-03-07 DIAGNOSIS — K91.2 HYPOGLYCEMIA FOLLOWING GASTROINTESTINAL SURGERY: ICD-10-CM

## 2025-03-07 DIAGNOSIS — R53.83 FATIGUE, UNSPECIFIED TYPE: ICD-10-CM

## 2025-03-07 DIAGNOSIS — E55.9 AVITAMINOSIS D: ICD-10-CM

## 2025-03-07 DIAGNOSIS — I10 ESSENTIAL HYPERTENSION, MALIGNANT: ICD-10-CM

## 2025-03-07 DIAGNOSIS — Z51.81 ENCOUNTER FOR THERAPEUTIC DRUG MONITORING: ICD-10-CM

## 2025-03-07 DIAGNOSIS — E78.5 HYPERLIPIDEMIA, UNSPECIFIED HYPERLIPIDEMIA TYPE: ICD-10-CM

## 2025-03-07 DIAGNOSIS — K90.9 IDIOPATHIC STEATORRHEA: ICD-10-CM

## 2025-03-07 DIAGNOSIS — Z79.899 ENCOUNTER FOR LONG-TERM (CURRENT) USE OF MEDICATIONS: ICD-10-CM

## 2025-03-07 DIAGNOSIS — I25.10 CORONARY ATHEROSCLEROSIS OF NATIVE CORONARY ARTERY: Primary | ICD-10-CM

## 2025-03-07 LAB
25(OH)D3+25(OH)D2 SERPL-MCNC: 24 NG/ML (ref 30–80)
ALBUMIN SERPL-MCNC: 3.8 G/DL (ref 3.5–5)
ALBUMIN/GLOB SERPL: 1.3 RATIO (ref 1.1–2)
ALP SERPL-CCNC: 71 UNIT/L (ref 40–150)
ALT SERPL-CCNC: 28 UNIT/L (ref 0–55)
ANION GAP SERPL CALC-SCNC: 9 MEQ/L
AST SERPL-CCNC: 19 UNIT/L (ref 5–34)
BILIRUB SERPL-MCNC: 0.6 MG/DL
BUN SERPL-MCNC: 9.5 MG/DL (ref 9.8–20.1)
CALCIUM SERPL-MCNC: 8.8 MG/DL (ref 8.4–10.2)
CHLORIDE SERPL-SCNC: 111 MMOL/L (ref 98–107)
CHOLEST SERPL-MCNC: 186 MG/DL
CHOLEST/HDLC SERPL: 3 {RATIO} (ref 0–5)
CO2 SERPL-SCNC: 23 MMOL/L (ref 22–29)
CREAT SERPL-MCNC: 0.72 MG/DL (ref 0.55–1.02)
CREAT/UREA NIT SERPL: 13
ERYTHROCYTE [DISTWIDTH] IN BLOOD BY AUTOMATED COUNT: 13.5 % (ref 11.5–17)
EST. AVERAGE GLUCOSE BLD GHB EST-MCNC: 105.4 MG/DL
GFR SERPLBLD CREATININE-BSD FMLA CKD-EPI: >60 ML/MIN/1.73/M2
GLOBULIN SER-MCNC: 3 GM/DL (ref 2.4–3.5)
GLUCOSE SERPL-MCNC: 99 MG/DL (ref 74–100)
HBA1C MFR BLD: 5.3 %
HCT VFR BLD AUTO: 40.5 % (ref 37–47)
HDLC SERPL-MCNC: 61 MG/DL (ref 35–60)
HGB BLD-MCNC: 12.8 G/DL (ref 12–16)
LDLC SERPL CALC-MCNC: 100 MG/DL (ref 50–140)
MCH RBC QN AUTO: 28.3 PG (ref 27–31)
MCHC RBC AUTO-ENTMCNC: 31.6 G/DL (ref 33–36)
MCV RBC AUTO: 89.4 FL (ref 80–94)
NRBC BLD AUTO-RTO: 0 %
PLATELET # BLD AUTO: 217 X10(3)/MCL (ref 130–400)
PMV BLD AUTO: 10.7 FL (ref 7.4–10.4)
POTASSIUM SERPL-SCNC: 3.6 MMOL/L (ref 3.5–5.1)
PROT SERPL-MCNC: 6.8 GM/DL (ref 6.4–8.3)
RBC # BLD AUTO: 4.53 X10(6)/MCL (ref 4.2–5.4)
SODIUM SERPL-SCNC: 143 MMOL/L (ref 136–145)
TRIGL SERPL-MCNC: 125 MG/DL (ref 37–140)
VLDLC SERPL CALC-MCNC: 25 MG/DL
WBC # BLD AUTO: 23.83 X10(3)/MCL (ref 4.5–11.5)

## 2025-03-07 PROCEDURE — 80053 COMPREHEN METABOLIC PANEL: CPT

## 2025-03-07 PROCEDURE — 83036 HEMOGLOBIN GLYCOSYLATED A1C: CPT

## 2025-03-07 PROCEDURE — 80061 LIPID PANEL: CPT

## 2025-03-07 PROCEDURE — 85027 COMPLETE CBC AUTOMATED: CPT

## 2025-03-07 PROCEDURE — 36415 COLL VENOUS BLD VENIPUNCTURE: CPT

## 2025-03-07 PROCEDURE — 82306 VITAMIN D 25 HYDROXY: CPT

## 2025-03-07 PROCEDURE — 84597 ASSAY OF VITAMIN K: CPT

## 2025-03-11 LAB — PHYTONADIONE SERPL-MCNC: 0.1 NG/ML (ref 0.1–2.2)

## 2025-05-22 ENCOUNTER — LAB VISIT (OUTPATIENT)
Dept: LAB | Facility: HOSPITAL | Age: 50
End: 2025-05-22
Attending: INTERNAL MEDICINE
Payer: COMMERCIAL

## 2025-05-22 DIAGNOSIS — Z13.21 SCREENING FOR MALNUTRITION: ICD-10-CM

## 2025-05-22 DIAGNOSIS — I10 HYPERTENSION, ESSENTIAL: ICD-10-CM

## 2025-05-22 DIAGNOSIS — E55.9 VITAMIN D DEFICIENCY: ICD-10-CM

## 2025-05-22 DIAGNOSIS — K90.9 INTESTINAL MALABSORPTION, UNSPECIFIED TYPE: ICD-10-CM

## 2025-05-22 DIAGNOSIS — Z13.228 SCREENING FOR PHENYLKETONURIA (PKU): ICD-10-CM

## 2025-05-22 DIAGNOSIS — E53.8 BIOTIN-(PROPIONYL-COA-CARBOXYLASE) LIGASE DEFICIENCY: Primary | ICD-10-CM

## 2025-05-22 DIAGNOSIS — E78.00 PURE HYPERCHOLESTEROLEMIA: ICD-10-CM

## 2025-05-22 DIAGNOSIS — Z13.29 SCREENING FOR THYROID DISORDER: ICD-10-CM

## 2025-05-22 DIAGNOSIS — E11.9 DIABETES MELLITUS WITHOUT COMPLICATION: ICD-10-CM

## 2025-05-22 LAB
25(OH)D3+25(OH)D2 SERPL-MCNC: 19 NG/ML (ref 30–80)
CHOLEST SERPL-MCNC: 213 MG/DL
CHOLEST/HDLC SERPL: 3 {RATIO} (ref 0–5)
EST. AVERAGE GLUCOSE BLD GHB EST-MCNC: 111.2 MG/DL
FERRITIN SERPL-MCNC: 301.01 NG/ML (ref 4.63–204)
HBA1C MFR BLD: 5.5 %
HDLC SERPL-MCNC: 61 MG/DL (ref 35–60)
IRON SATN MFR SERPL: 34 % (ref 20–50)
IRON SERPL-MCNC: 100 UG/DL (ref 50–170)
LDLC SERPL CALC-MCNC: 119 MG/DL (ref 50–140)
TIBC SERPL-MCNC: 191 UG/DL (ref 70–310)
TIBC SERPL-MCNC: 291 UG/DL (ref 250–450)
TRANSFERRIN SERPL-MCNC: 264 MG/DL (ref 180–382)
TRIGL SERPL-MCNC: 167 MG/DL (ref 37–140)
VLDLC SERPL CALC-MCNC: 33 MG/DL

## 2025-05-22 PROCEDURE — 83036 HEMOGLOBIN GLYCOSYLATED A1C: CPT

## 2025-05-22 PROCEDURE — 80061 LIPID PANEL: CPT

## 2025-05-22 PROCEDURE — 83540 ASSAY OF IRON: CPT

## 2025-05-22 PROCEDURE — 82306 VITAMIN D 25 HYDROXY: CPT

## 2025-05-22 PROCEDURE — 82728 ASSAY OF FERRITIN: CPT

## 2025-05-22 PROCEDURE — 36415 COLL VENOUS BLD VENIPUNCTURE: CPT

## 2025-07-05 ENCOUNTER — HOSPITAL ENCOUNTER (EMERGENCY)
Facility: HOSPITAL | Age: 50
Discharge: HOME | End: 2025-07-06
Attending: STUDENT IN AN ORGANIZED HEALTH CARE EDUCATION/TRAINING PROGRAM
Payer: COMMERCIAL

## 2025-07-05 DIAGNOSIS — R07.9 CHEST PAIN: ICD-10-CM

## 2025-07-05 PROCEDURE — 93005 ELECTROCARDIOGRAM TRACING: CPT

## 2025-07-05 PROCEDURE — 99285 EMERGENCY DEPT VISIT HI MDM: CPT | Mod: 25

## 2025-07-05 PROCEDURE — 93010 ELECTROCARDIOGRAM REPORT: CPT | Mod: ,,, | Performed by: INTERNAL MEDICINE

## 2025-07-06 VITALS
SYSTOLIC BLOOD PRESSURE: 123 MMHG | BODY MASS INDEX: 42.8 KG/M2 | OXYGEN SATURATION: 96 % | WEIGHT: 289 LBS | HEART RATE: 77 BPM | HEIGHT: 69 IN | RESPIRATION RATE: 14 BRPM | TEMPERATURE: 98 F | DIASTOLIC BLOOD PRESSURE: 76 MMHG

## 2025-07-06 LAB
ABS NEUT (OLG): 3.93 X10(3)/MCL (ref 2.1–9.2)
ALBUMIN SERPL-MCNC: 3.9 G/DL (ref 3.5–5)
ALBUMIN/GLOB SERPL: 1.2 RATIO (ref 1.1–2)
ALP SERPL-CCNC: 77 UNIT/L (ref 40–150)
ALT SERPL-CCNC: 30 UNIT/L (ref 0–55)
ANION GAP SERPL CALC-SCNC: 10 MEQ/L
AST SERPL-CCNC: 18 UNIT/L (ref 11–45)
BASOPHILS NFR BLD MANUAL: 0.46 X10(3)/MCL (ref 0–0.2)
BASOPHILS NFR BLD MANUAL: 2 %
BILIRUB SERPL-MCNC: 0.7 MG/DL
BNP BLD-MCNC: <10 PG/ML
BUN SERPL-MCNC: 11.7 MG/DL (ref 9.8–20.1)
CALCIUM SERPL-MCNC: 9.2 MG/DL (ref 8.4–10.2)
CHLORIDE SERPL-SCNC: 110 MMOL/L (ref 98–107)
CO2 SERPL-SCNC: 25 MMOL/L (ref 22–29)
CREAT SERPL-MCNC: 0.73 MG/DL (ref 0.55–1.02)
CREAT/UREA NIT SERPL: 16
ERYTHROCYTE [DISTWIDTH] IN BLOOD BY AUTOMATED COUNT: 13.9 % (ref 11.5–17)
FLUAV AG UPPER RESP QL IA.RAPID: NOT DETECTED
FLUBV AG UPPER RESP QL IA.RAPID: NOT DETECTED
GFR SERPLBLD CREATININE-BSD FMLA CKD-EPI: >60 ML/MIN/1.73/M2
GLOBULIN SER-MCNC: 3.3 GM/DL (ref 2.4–3.5)
GLUCOSE SERPL-MCNC: 93 MG/DL (ref 74–100)
HCT VFR BLD AUTO: 39.4 % (ref 37–47)
HGB BLD-MCNC: 12.4 G/DL (ref 12–16)
INR PPP: 0.9
INSTRUMENT WBC (OLG): 23.14 X10(3)/MCL
LYMPHOCYTES NFR BLD MANUAL: 18.05 X10(3)/MCL (ref 0.6–4.6)
LYMPHOCYTES NFR BLD MANUAL: 78 %
MCH RBC QN AUTO: 27.4 PG (ref 27–31)
MCHC RBC AUTO-ENTMCNC: 31.5 G/DL (ref 33–36)
MCV RBC AUTO: 87.2 FL (ref 80–94)
MONOCYTES NFR BLD MANUAL: 0.69 X10(3)/MCL (ref 0.1–1.3)
MONOCYTES NFR BLD MANUAL: 3 %
NEUTROPHILS NFR BLD MANUAL: 17 %
OHS QRS DURATION: 94 MS
OHS QTC CALCULATION: 464 MS
PLATELET # BLD AUTO: 222 X10(3)/MCL (ref 130–400)
PLATELET # BLD EST: NORMAL 10*3/UL
PMV BLD AUTO: 10.6 FL (ref 7.4–10.4)
POTASSIUM SERPL-SCNC: 3.8 MMOL/L (ref 3.5–5.1)
PROT SERPL-MCNC: 7.2 GM/DL (ref 6.4–8.3)
PROTHROMBIN TIME: 12.4 SECONDS (ref 12.5–14.5)
RBC # BLD AUTO: 4.52 X10(6)/MCL (ref 4.2–5.4)
RBC MORPH BLD: NORMAL
SARS-COV-2 RNA RESP QL NAA+PROBE: NOT DETECTED
SODIUM SERPL-SCNC: 145 MMOL/L (ref 136–145)
TROPONIN I SERPL-MCNC: <0.01 NG/ML (ref 0–0.04)
WBC # BLD AUTO: 23.14 X10(3)/MCL (ref 4.5–11.5)

## 2025-07-06 PROCEDURE — 96374 THER/PROPH/DIAG INJ IV PUSH: CPT

## 2025-07-06 PROCEDURE — 96375 TX/PRO/DX INJ NEW DRUG ADDON: CPT

## 2025-07-06 PROCEDURE — 25500020 PHARM REV CODE 255: Performed by: STUDENT IN AN ORGANIZED HEALTH CARE EDUCATION/TRAINING PROGRAM

## 2025-07-06 PROCEDURE — 63600175 PHARM REV CODE 636 W HCPCS: Performed by: STUDENT IN AN ORGANIZED HEALTH CARE EDUCATION/TRAINING PROGRAM

## 2025-07-06 PROCEDURE — 80053 COMPREHEN METABOLIC PANEL: CPT | Performed by: STUDENT IN AN ORGANIZED HEALTH CARE EDUCATION/TRAINING PROGRAM

## 2025-07-06 PROCEDURE — 85610 PROTHROMBIN TIME: CPT | Performed by: STUDENT IN AN ORGANIZED HEALTH CARE EDUCATION/TRAINING PROGRAM

## 2025-07-06 PROCEDURE — 83880 ASSAY OF NATRIURETIC PEPTIDE: CPT | Performed by: STUDENT IN AN ORGANIZED HEALTH CARE EDUCATION/TRAINING PROGRAM

## 2025-07-06 PROCEDURE — 84484 ASSAY OF TROPONIN QUANT: CPT | Performed by: STUDENT IN AN ORGANIZED HEALTH CARE EDUCATION/TRAINING PROGRAM

## 2025-07-06 PROCEDURE — 87636 SARSCOV2 & INF A&B AMP PRB: CPT | Performed by: STUDENT IN AN ORGANIZED HEALTH CARE EDUCATION/TRAINING PROGRAM

## 2025-07-06 PROCEDURE — 85025 COMPLETE CBC W/AUTO DIFF WBC: CPT | Performed by: STUDENT IN AN ORGANIZED HEALTH CARE EDUCATION/TRAINING PROGRAM

## 2025-07-06 RX ORDER — ONDANSETRON HYDROCHLORIDE 2 MG/ML
4 INJECTION, SOLUTION INTRAVENOUS
Status: COMPLETED | OUTPATIENT
Start: 2025-07-06 | End: 2025-07-06

## 2025-07-06 RX ORDER — HYDROCODONE BITARTRATE AND ACETAMINOPHEN 5; 325 MG/1; MG/1
1 TABLET ORAL EVERY 8 HOURS PRN
Qty: 15 TABLET | Refills: 0 | Status: SHIPPED | OUTPATIENT
Start: 2025-07-06 | End: 2025-07-11

## 2025-07-06 RX ORDER — MORPHINE SULFATE 4 MG/ML
4 INJECTION, SOLUTION INTRAMUSCULAR; INTRAVENOUS
Refills: 0 | Status: COMPLETED | OUTPATIENT
Start: 2025-07-06 | End: 2025-07-06

## 2025-07-06 RX ORDER — METHOCARBAMOL 1000 MG/1
1000 TABLET, FILM COATED ORAL 3 TIMES DAILY
Qty: 21 TABLET | Refills: 0 | Status: SHIPPED | OUTPATIENT
Start: 2025-07-06 | End: 2025-07-13

## 2025-07-06 RX ADMIN — MORPHINE SULFATE 4 MG: 4 INJECTION INTRAVENOUS at 01:07

## 2025-07-06 RX ADMIN — IOHEXOL 100 ML: 350 INJECTION, SOLUTION INTRAVENOUS at 02:07

## 2025-07-06 RX ADMIN — ONDANSETRON 4 MG: 2 INJECTION INTRAMUSCULAR; INTRAVENOUS at 01:07

## 2025-07-06 NOTE — ED PROVIDER NOTES
Encounter Date: 7/5/2025    SCRIBE #1 NOTE: I, Josh Mcconnell, am scribing for, and in the presence of,  Giovanni Glaser MD. I have scribed the following portions of the note - Other sections scribed: HPI,ROS,PE.       History     Chief Complaint   Patient presents with    Chest Pain     C/o chest pain, severe body aches, and nausea.  PMH Stage 3 lymphoma, enlarged aorta. Takes oral chemo BID.      Patient is a 50 y/o female with PMHx of CLL, DM, HTN, thyroid disease, and sleep apnea presents to ED c/o chest pain onset 7/2. Pt reports the pain is located at the center of her chest, with associated left arm tingling, and shortness of breath. She reports taking clonidine and 2x hydralazine when she noticed her BP was 148/105. She denies any leg swelling, nausea, vomiting, diaphoresis, fever, or chills.     The history is provided by the patient and medical records.     Review of patient's allergies indicates:  No Known Allergies  Past Medical History:   Diagnosis Date    Arthritis     CLL (chronic lymphocytic leukemia)     Diabetes mellitus     Hypertension     Migraines     Sleep apnea     cpap    Thyroid disease     states recently instructed she could stop thyroid med.     Past Surgical History:   Procedure Laterality Date    BIOPSY OF AXILLARY NODE Left 8/18/2023    Procedure: BIOPSY, LYMPH NODE, AXILLARY;  Surgeon: Sean Styles MD;  Location: [facility] OR;  Service: General;  Laterality: Left;    CARPAL TUNNEL RELEASE Right     CHOLECYSTECTOMY      HYSTERECTOMY      KNEE LIGAMENT RECONSTRUCTION Right     LYMPH NODE BIOPSY Left 8/18/2023    Procedure: BIOPSY, LYMPH NODE Left  Axilla 2cm;  Surgeon: Sean Styles MD;  Location: [facility] OR;  Service: General;  Laterality: Left;    DIANA-EN-Y PROCEDURE      rt foot Right     for neuropathy    TONSILLECTOMY AND ADENOIDECTOMY      TUBAL LIGATION       Family History   Problem Relation Name Age of Onset    Diabetes Mother      Heart failure Father       Social  History[1]  Review of Systems   Constitutional:  Negative for chills and fever.   HENT:  Negative for sore throat.    Eyes:  Negative for visual disturbance.   Respiratory:  Positive for shortness of breath.    Cardiovascular:  Positive for chest pain. Negative for leg swelling.   Gastrointestinal:  Negative for abdominal pain, nausea and vomiting.   Genitourinary:  Negative for dysuria.   Musculoskeletal:  Negative for joint swelling.   Skin:  Negative for rash.   Neurological:  Positive for numbness (left arm tingling). Negative for weakness.   Psychiatric/Behavioral:  Negative for confusion.        Physical Exam     Initial Vitals [07/05/25 2240]   BP Pulse Resp Temp SpO2   126/81 76 18 98.3 °F (36.8 °C) 96 %      MAP       --         Physical Exam    Nursing note and vitals reviewed.  Constitutional: She appears well-developed and well-nourished.   HENT:   Head: Normocephalic and atraumatic.   Eyes: EOM are normal. Pupils are equal, round, and reactive to light.   Neck:   Normal range of motion.  Cardiovascular:  Normal rate, regular rhythm, normal heart sounds and intact distal pulses.           No murmur heard.  Pulmonary/Chest: Breath sounds normal. No respiratory distress. She has no wheezes. She has no rales.   Abdominal: Abdomen is soft. She exhibits no distension. There is no abdominal tenderness. There is no rebound.   Musculoskeletal:         General: No tenderness or edema. Normal range of motion.      Cervical back: Normal range of motion.     Neurological: She is alert. She has normal strength. No cranial nerve deficit. GCS score is 15.   Skin: Skin is warm and dry. Capillary refill takes less than 2 seconds. No rash noted. No erythema.   Psychiatric: She has a normal mood and affect.         ED Course   Procedures  Labs Reviewed   COMPREHENSIVE METABOLIC PANEL - Abnormal       Result Value    Sodium 145      Potassium 3.8      Chloride 110 (*)     CO2 25      Glucose 93      Blood Urea Nitrogen 11.7       Creatinine 0.73      Calcium 9.2      Protein Total 7.2      Albumin 3.9      Globulin 3.3      Albumin/Globulin Ratio 1.2      Bilirubin Total 0.7      ALP 77      ALT 30      AST 18      eGFR >60      Anion Gap 10.0      BUN/Creatinine Ratio 16     PROTIME-INR - Abnormal    PT 12.4 (*)     INR 0.9      Narrative:     Protimes are used to monitor anticoagulant agents such as warfarin. PT INR values are based on the current patient normal mean and the BRIDGET value for the specific instrument reagent used.  **Routine theraputic target values for the INR are 2.0-3.0**   CBC WITH DIFFERENTIAL - Abnormal    WBC 23.14 (*)     RBC 4.52      Hgb 12.4      Hct 39.4      MCV 87.2      MCH 27.4      MCHC 31.5 (*)     RDW 13.9      Platelet 222      MPV 10.6 (*)    MANUAL DIFFERENTIAL - Abnormal    WBC 23.14      Neutrophils % 17      Lymphs % 78      Monocytes % 3      Basophils % 2      Neutrophils Abs 3.9338      Lymphs Abs 18.0492 (*)     Monocytes Abs 0.6942      Basophils Abs 0.4628 (*)     Platelets Normal      RBC Morph Normal     B-TYPE NATRIURETIC PEPTIDE - Normal    Natriuretic Peptide <10.0     TROPONIN I - Normal    Troponin-I <0.010     COVID/FLU A&B PCR - Normal    Influenza A PCR Not Detected      Influenza B PCR Not Detected      SARS-CoV-2 PCR Not Detected      Narrative:     The Xpert Xpress SARS-CoV-2/FLU/RSV plus is a rapid, multiplexed real-time PCR test intended for the simultaneous qualitative detection and differentiation of SARS-CoV-2, Influenza A, Influenza B, and respiratory syncytial virus (RSV) viral RNA in either nasopharyngeal swab or nasal swab specimens.         CBC W/ AUTO DIFFERENTIAL    Narrative:     The following orders were created for panel order CBC auto differential.  Procedure                               Abnormality         Status                     ---------                               -----------         ------                     CBC with Differential[[id number]]        Abnormal            Final result               Manual Differential[[id number]]         Abnormal            Final result                 Please view results for these tests on the individual orders.     EKG Readings: (Independently Interpreted)   Initial Reading: No STEMI. Rhythm: Normal Sinus Rhythm. Heart Rate: 75. Ectopy: No Ectopy. Conduction: Normal. ST Segments: Normal ST Segments. T Waves: Normal. Axis: Normal.   Taken at 22:43     ECG Results              EKG 12-lead (Final result)        Collection Time Result Time QRS Duration OHS QTC Calculation    07/05/25 22:43:32 07/06/25 19:18:17 94 464                     Final result by Interface, Lab In [facility] (07/06/25 19:18:22)                   Narrative:    Test Reason : R07.9,    Vent. Rate :  75 BPM     Atrial Rate :  75 BPM     P-R Int : 202 ms          QRS Dur :  94 ms      QT Int : 416 ms       P-R-T Axes :  33 -14  27 degrees    QTcB Int : 464 ms    Normal sinus rhythm with sinus arrhythmia  Normal ECG  When compared with ECG of 22-Feb-2025 14:35,  No significant change was found  Confirmed by Shay Chisholm ([id number]) on 7/6/2025 7:18:13 PM    Referred By:            Confirmed By: Shay Chisholm                                     EKG 12-lead (Final result)  Result time 07/08/25 15:04:25      Final result by Unknown User (07/08/25 15:04:25)                                      Imaging Results              CTA Chest Aorta Non Coronary (Final result)  Result time 07/06/25 08:11:22      Final result by Sintia Gallagher MD (07/06/25 08:11:22)                   Impression:      No evidence of aortic dissection or intramural hematoma.    No significant change from the [facility] interpretation.      Electronically signed by: Sintia Gallagher  Date:    07/06/2025  Time:    08:11               Narrative:    EXAMINATION:  CTA CHEST AORTA NON CORONARY    CLINICAL HISTORY:  Aortic dissection suspected;    TECHNIQUE:  Helical-acquisition CT images were obtained prior to and  following the intravenous administration of iodine-based contrast media.    The post-contrast images were timed to coincide with arterial opacification for purpose of CT angiography.    Multiplanar reconstructions, to include MIP and volume-rendered (3D) images, were accomplished by the CT technologist at a separate workstation and pushed to PACS for physician review.    Total DLP (mGy-cm) 1629 (value may include radiation due to concomitantly performed CT imaging)    Automated tube current modulation and/or weight-based exposure dosing is utilized, when appropriate, to reach lowest reasonably achievable exposure rate.    COMPARISON:  CT chest dated 02/22/2025    FINDINGS:  The ascending thoracic aorta measures up to 4.3 cm in diameter, stable.  There is no evidence of an aortic dissection.  There is no intramural hematoma.  The heart is enlarged.  The RV to LV ratio is less than 1.  There is no pericardial effusion.    There is no focal airspace consolidation.  There is no pleural effusion or pneumothorax.    There are no acute findings in the upper abdomen.  There are postoperative changes the stomach.  The gallbladder has been surgically resected.    There is no acute bony abnormality.                        Preliminary result by Juan Merchant MD (07/06/25 02:25:08)                   Impression:    1. No filling defects are seen in the pulmonary arteries to suggest pulmonary embolus.  2. No acute focal infiltrate or consolidation is seen.  3. No CT evidence of pulmonary embolism or other acute intrathoracic pathology. Details and other findings as discussed above.               Narrative:    START OF REPORT:  Technique: CT Scan of the chest was performed with intravenous contrast with direct axial images as well as sagittal and coronal reconstruction images pulmonary embolus protocol.    Dosage Information: Automated Exposure Control was utilized 1628.91 mGy.cm.    Comparison: Comparison is with study dated  2025-02-22 16:36:52.    Clinical History: Chest pain.    Findings:  Soft Tissues: Unremarkable.  Neck: The visualized soft tissues of the neck appear unremarkable.  Mediastinum: The mediastinal structures are within normal limits.  Heart: The heart size is within normal limits.  Aorta: Unremarkable appearing aorta. No aortic dissection or aneurysm is seen.  Pulmonary Arteries: Unremarkable. No filling defects are seen in the pulmonary arteries to suggest pulmonary embolus.  Lungs: Subtle streaky linear opacity is seen predominantly in the dependent portions at the lung bases consistent with nonspecific dependent changes scarring and subsegmental atelectasis. No acute focal infiltrate or consolidation is seen.  Pleura: No effusions or pneumothorax are identified.  Bony Structures: The visualized bony structures appear unremarkable.  Abdomen: Surgical clips are seen in the right upper quadrant suggesting prior cholecystectomy. There is a stable small hialal hernia. Stable appearing post gastric sleeve surgical change is seen in the otherwise unremarkable appearing stomach. The visualized upper abdominal organs otherwise appear unremarkable.                                         X-Ray Chest AP Portable (Final result)  Result time 07/06/25 08:48:28      Final result by Binh Gongora MD (07/06/25 08:48:28)                   Impression:      No acute cardiopulmonary process identified.      Electronically signed by: Binh Gongora  Date:    07/06/2025  Time:    08:48               Narrative:    EXAMINATION:  XR CHEST AP PORTABLE    CLINICAL HISTORY:  Chest Pain;    TECHNIQUE:  One view    COMPARISON:  February 22, 2025.    FINDINGS:  Cardiopericardial silhouette is within normal limits. Lungs are without dense focal or segmental consolidation, congestive process, pleural effusions or pneumothorax.                                       Medications   morphine injection 4 mg (4 mg Intravenous Given 7/6/25 0159)    ondansetron injection 4 mg (4 mg Intravenous Given 7/6/25 7688)   iohexoL (OMNIPAQUE 350) injection 100 mL (100 mLs Intravenous Given 7/6/25 0074)     Medical Decision Making  Judging by the patient's chief complaint and pertinent history, the patient has the following possible differential diagnoses, including but not limited to the following.  Some of these are deemed to be lower likelihood and some more likely based on my physical exam and history combined with possible lab work and/or imaging studies.   Please see the pertinent studies, and refer to the HPI.  Some of these diagnoses will take further evaluation to fully rule out, perhaps as an outpatient and the patient was encouraged to follow up when discharged for more comprehensive evaluation.    Chest pain emergent diagnoses: ACS, PE, dissection, cardiac tamponade, tension pneumothorax.    Chest pain non-emergent diagnoses: Musculoskeletal, trauma, pleurisy, pneumonia, pleural effusion, GERD, other GI, neurologic, psychiatric and other non emergent diagnoses considered.      Patient is a 49-year-old female who presents to emergency department for chest pain.  See HPI.  See physical exam.  EKG without ST elevation.  Troponin within normal limits.  Leukocytosis noted.  Afebrile.  History of lymphoma and enlarged aorta.  Patient currently on chemotherapy.  CTA obtained without evidence of any aortic dissection or intramural hematoma.  No evidence of worsening aneurysm.  Pain controlled.  On reassessment resting comfortably, no acute distress. Reassessed patient.  Patient is resting comfortably.  Discussed all results.  Discussed need for follow-up.  Discussed return precautions.  Answered all questions at this time.  Hemodynamically stable for continued outpatient management with strict return precautions.  Patient and family verbalized understanding agreed to plan.      Problems Addressed:  Chest pain: acute illness or injury that poses a threat to life or  bodily functions    Amount and/or Complexity of Data Reviewed  Labs: ordered. Decision-making details documented in ED Course.  Radiology: ordered. Decision-making details documented in ED Course.  ECG/medicine tests: ordered and independent interpretation performed.     Details: Initial Reading: No STEMI. Rhythm: Normal Sinus Rhythm. Heart Rate: 75. Ectopy: No Ectopy. Conduction: Normal. ST Segments: Normal ST Segments. T Waves: Normal. Axis: Normal.   Taken at 22:43     Risk  Prescription drug management.  Parenteral controlled substances.  Decision regarding hospitalization.            Scribe Attestation:   Scribe #1: I performed the above scribed service and the documentation accurately describes the services I performed. I attest to the accuracy of the note.    Attending Attestation:           Physician Attestation for Scribe:  Physician Attestation Statement for Scribe #1: I, Giovanni Glaser MD, reviewed documentation, as scribed by Josh Mcconnell in my presence, and it is both accurate and complete.             ED Course as of 07/15/25 1551   Sun 2025   0333 Discussed with MONIQUE Gordon  [RP]      ED Course User Index  [RP] Giovanni Glaser MD                           Clinical Impression:  Final diagnoses:  [R07.9] Chest pain          ED Disposition Condition    Discharge Stable          ED Prescriptions       Medication Sig Dispense Start Date End Date Auth. Provider    methocarbamoL 1,000 mg Tab () Take 1,000 mg by mouth 3 (three) times daily. for 7 days 21 tablet 2025 Giovanni Glaser MD    HYDROcodone-acetaminophen (NORCO) 5-325 mg per tablet () Take 1 tablet by mouth every 8 (eight) hours as needed for Pain. 15 tablet 2025 Giovanni Glaser MD          Follow-up Information       Follow up With Specialties Details Why Contact Info    Raj Yusuf MD Internal Medicine   [address] [place] [address]  [address]  [place] [address]  [phone]      Primary Care   Call in 1 day  Please call [phone] for a primary care provider.    Romero Vann MD Cardiology   [address] [place] [id number]  [phone]                     [1]   Social History  Tobacco Use    Smoking status: Never    Smokeless tobacco: Never   Substance Use Topics    Alcohol use: Not Currently     Comment: wine socially    Drug use: Never        Giovanni Glaser MD  07/15/25 9714

## 2025-07-06 NOTE — DISCHARGE INSTRUCTIONS
Follow-up with the primary care physician.      Follow-up with cardiology.      Return to emergency department if any new or worsening symptoms.      You may take muscle relaxer and pain medication as prescribed.    Return if any trouble breathing, nausea, vomiting, fever, or any other concerns.

## (undated) DEVICE — COVER PROXIMA MAYO STAND

## (undated) DEVICE — Device

## (undated) DEVICE — CONTAINER SPECIMEN SCREW 4OZ

## (undated) DEVICE — BLADE SURG STAINLESS STEEL #15

## (undated) DEVICE — SUT VICRYL 3-0 27 SH

## (undated) DEVICE — SPONGE KERLIX ANTIMIC 6X6.75IN

## (undated) DEVICE — NDL HYPO REG 25G X 1 1/2

## (undated) DEVICE — CLOSURE SKIN STERI STRIP 1/2X4

## (undated) DEVICE — GLOVE PROTEXIS PI SYN SURG 6.5

## (undated) DEVICE — SOL NACL IRR 1000ML BTL

## (undated) DEVICE — SPONGE LAP STRL 18X18IN

## (undated) DEVICE — SUT PROLENE 4-0 FS-2 BL MON

## (undated) DEVICE — SYR W NDL BLN FILL 5ML 18GX1.5

## (undated) DEVICE — GLOVE PROTEXIS PI SYN SURG 6.0

## (undated) DEVICE — GLOVE SURG BIOGEL LATEX SZ 7.5

## (undated) DEVICE — BLADE SURG STAINLESS STEEL #10

## (undated) DEVICE — APPLIER CLIP LIAGCLIP 9.375IN

## (undated) DEVICE — SUPPORT ULNA NERVE PROTECTOR

## (undated) DEVICE — GAUZE SPONGE 4X4 12PLY

## (undated) DEVICE — NDL SYR 10ML 18X1.5 LL BLUNT

## (undated) DEVICE — SUT SILK 2.0 BLK 18

## (undated) DEVICE — ADHESIVE MASTISOL VIAL 48/BX

## (undated) DEVICE — GLOVE PROTEXIS BLUE LATEX 7

## (undated) DEVICE — SUT VICRYL PLUS 4-0 FS-2 27IN

## (undated) DEVICE — GOWN X-LG STERILE BACK

## (undated) DEVICE — HEMOSTAT SURGICEL FIBRLR 2X4IN

## (undated) DEVICE — ELECTRODE PATIENT RETURN DISP